# Patient Record
Sex: MALE | Race: WHITE | NOT HISPANIC OR LATINO | Employment: OTHER | URBAN - METROPOLITAN AREA
[De-identification: names, ages, dates, MRNs, and addresses within clinical notes are randomized per-mention and may not be internally consistent; named-entity substitution may affect disease eponyms.]

---

## 2017-01-30 ENCOUNTER — ALLSCRIPTS OFFICE VISIT (OUTPATIENT)
Dept: OTHER | Facility: OTHER | Age: 52
End: 2017-01-30

## 2017-01-30 DIAGNOSIS — K21.00 GASTRO-ESOPHAGEAL REFLUX DISEASE WITH ESOPHAGITIS: ICD-10-CM

## 2017-01-30 DIAGNOSIS — J22 ACUTE LOWER RESPIRATORY INFECTION: ICD-10-CM

## 2017-01-30 DIAGNOSIS — Z00.00 ENCOUNTER FOR GENERAL ADULT MEDICAL EXAMINATION WITHOUT ABNORMAL FINDINGS: ICD-10-CM

## 2017-01-30 DIAGNOSIS — I10 ESSENTIAL (PRIMARY) HYPERTENSION: ICD-10-CM

## 2017-01-30 DIAGNOSIS — Q54.0 HYPOSPADIAS, BALANIC: ICD-10-CM

## 2017-01-30 DIAGNOSIS — K44.9 DIAPHRAGMATIC HERNIA WITHOUT OBSTRUCTION OR GANGRENE: ICD-10-CM

## 2017-02-06 ENCOUNTER — HOSPITAL ENCOUNTER (OUTPATIENT)
Dept: RADIOLOGY | Facility: HOSPITAL | Age: 52
Discharge: HOME | End: 2017-02-06
Payer: COMMERCIAL

## 2017-02-06 DIAGNOSIS — K21.00 GASTRO-ESOPHAGEAL REFLUX DISEASE WITH ESOPHAGITIS: ICD-10-CM

## 2017-02-06 DIAGNOSIS — Z00.00 ENCOUNTER FOR GENERAL ADULT MEDICAL EXAMINATION WITHOUT ABNORMAL FINDINGS: ICD-10-CM

## 2017-02-06 DIAGNOSIS — K44.9 DIAPHRAGMATIC HERNIA WITHOUT OBSTRUCTION OR GANGRENE: ICD-10-CM

## 2017-02-06 DIAGNOSIS — Q54.0 HYPOSPADIAS, BALANIC: ICD-10-CM

## 2017-02-06 DIAGNOSIS — J22 ACUTE LOWER RESPIRATORY INFECTION: ICD-10-CM

## 2017-02-06 DIAGNOSIS — I10 ESSENTIAL (PRIMARY) HYPERTENSION: ICD-10-CM

## 2017-02-06 PROCEDURE — 74220 X-RAY XM ESOPHAGUS 1CNTRST: CPT

## 2017-03-09 ENCOUNTER — ALLSCRIPTS OFFICE VISIT (OUTPATIENT)
Dept: OTHER | Facility: OTHER | Age: 52
End: 2017-03-09

## 2017-03-13 ENCOUNTER — GENERIC CONVERSION - ENCOUNTER (OUTPATIENT)
Dept: OTHER | Facility: OTHER | Age: 52
End: 2017-03-13

## 2017-04-10 ENCOUNTER — ALLSCRIPTS OFFICE VISIT (OUTPATIENT)
Dept: OTHER | Facility: OTHER | Age: 52
End: 2017-04-10

## 2017-04-27 ENCOUNTER — HOSPITAL ENCOUNTER (INPATIENT)
Facility: HOSPITAL | Age: 52
LOS: 6 days | Discharge: HOME | DRG: 034 | End: 2017-05-03
Attending: ORTHOPAEDIC SURGERY | Admitting: ORTHOPAEDIC SURGERY
Payer: OTHER MISCELLANEOUS

## 2017-04-27 DIAGNOSIS — R29.898 WEAKNESS OF BOTH LOWER EXTREMITIES: ICD-10-CM

## 2017-04-27 DIAGNOSIS — G89.11 PAIN, ACUTE DUE TO TRAUMA: Primary | ICD-10-CM

## 2017-04-27 DIAGNOSIS — R29.898 WEAKNESS OF BACK: ICD-10-CM

## 2017-04-27 PROCEDURE — 87081 CULTURE SCREEN ONLY: CPT | Performed by: ORTHOPAEDIC SURGERY

## 2017-04-27 RX ORDER — ONDANSETRON 2 MG/ML
4 INJECTION INTRAMUSCULAR; INTRAVENOUS EVERY 6 HOURS PRN
Status: DISCONTINUED | OUTPATIENT
Start: 2017-04-27 | End: 2017-05-03 | Stop reason: HOSPADM

## 2017-04-27 RX ORDER — PANTOPRAZOLE SODIUM 20 MG/1
20 TABLET, DELAYED RELEASE ORAL
Status: DISCONTINUED | OUTPATIENT
Start: 2017-04-28 | End: 2017-05-03 | Stop reason: HOSPADM

## 2017-04-27 RX ORDER — LISINOPRIL 10 MG/1
10 TABLET ORAL DAILY
Status: DISCONTINUED | OUTPATIENT
Start: 2017-04-28 | End: 2017-05-03 | Stop reason: HOSPADM

## 2017-04-27 RX ORDER — ZOLPIDEM TARTRATE 5 MG/1
10 TABLET ORAL
Status: DISCONTINUED | OUTPATIENT
Start: 2017-04-27 | End: 2017-05-03 | Stop reason: HOSPADM

## 2017-04-27 RX ORDER — HYDROCODONE BITARTRATE 20 MG/1
20 CAPSULE, EXTENDED RELEASE ORAL EVERY 12 HOURS
Status: DISCONTINUED | OUTPATIENT
Start: 2017-04-27 | End: 2017-04-27

## 2017-04-27 RX ORDER — KETOROLAC TROMETHAMINE 30 MG/ML
15 INJECTION, SOLUTION INTRAMUSCULAR; INTRAVENOUS EVERY 6 HOURS PRN
Status: DISCONTINUED | OUTPATIENT
Start: 2017-04-27 | End: 2017-04-27

## 2017-04-27 RX ORDER — SODIUM CHLORIDE, SODIUM LACTATE, POTASSIUM CHLORIDE, CALCIUM CHLORIDE 600; 310; 30; 20 MG/100ML; MG/100ML; MG/100ML; MG/100ML
50 INJECTION, SOLUTION INTRAVENOUS CONTINUOUS
Status: DISCONTINUED | OUTPATIENT
Start: 2017-04-27 | End: 2017-05-03 | Stop reason: HOSPADM

## 2017-04-27 RX ORDER — KETOROLAC TROMETHAMINE 30 MG/ML
15 INJECTION, SOLUTION INTRAMUSCULAR; INTRAVENOUS EVERY 6 HOURS PRN
Status: DISCONTINUED | OUTPATIENT
Start: 2017-04-27 | End: 2017-04-29

## 2017-04-27 RX ORDER — OMEPRAZOLE 20 MG/1
20 CAPSULE, DELAYED RELEASE ORAL DAILY
Status: DISCONTINUED | OUTPATIENT
Start: 2017-04-28 | End: 2017-04-27

## 2017-04-27 RX ORDER — CLONIDINE HYDROCHLORIDE 0.1 MG/1
0.2 TABLET ORAL 2 TIMES DAILY
Status: DISCONTINUED | OUTPATIENT
Start: 2017-04-27 | End: 2017-04-28

## 2017-04-27 RX ORDER — OXYCODONE HYDROCHLORIDE AND ACETAMINOPHEN 5; 325 MG/1; MG/1
1 TABLET ORAL EVERY 4 HOURS PRN
Status: DISCONTINUED | OUTPATIENT
Start: 2017-04-27 | End: 2017-04-29

## 2017-04-27 RX ADMIN — HYDROMORPHONE HYDROCHLORIDE 1 MG: 1 INJECTION, SOLUTION INTRAMUSCULAR; INTRAVENOUS; SUBCUTANEOUS at 21:56

## 2017-04-27 RX ADMIN — DEXAMETHASONE SODIUM PHOSPHATE 6 MG: 10 INJECTION, SOLUTION INTRAMUSCULAR; INTRAVENOUS at 20:00

## 2017-04-27 RX ADMIN — CLONIDINE HYDROCHLORIDE 0.2 MG: 0.1 TABLET ORAL at 21:57

## 2017-04-27 RX ADMIN — SODIUM CHLORIDE, SODIUM LACTATE, POTASSIUM CHLORIDE, AND CALCIUM CHLORIDE 50 ML/HR: .6; .31; .03; .02 INJECTION, SOLUTION INTRAVENOUS at 21:57

## 2017-04-27 RX ADMIN — ZOLPIDEM TARTRATE 10 MG: 5 TABLET, COATED ORAL at 21:57

## 2017-04-28 ENCOUNTER — APPOINTMENT (INPATIENT)
Dept: RADIOLOGY | Facility: HOSPITAL | Age: 52
DRG: 034 | End: 2017-04-28
Payer: OTHER MISCELLANEOUS

## 2017-04-28 PROBLEM — R29.898 LOWER EXTREMITY WEAKNESS: Status: ACTIVE | Noted: 2017-04-28

## 2017-04-28 LAB
ANION GAP SERPL CALCULATED.3IONS-SCNC: 11 MMOL/L (ref 4–13)
BUN SERPL-MCNC: 15 MG/DL (ref 5–25)
CALCIUM SERPL-MCNC: 9.2 MG/DL (ref 8.3–10.1)
CHLORIDE SERPL-SCNC: 100 MMOL/L (ref 100–108)
CO2 SERPL-SCNC: 24 MMOL/L (ref 21–32)
CREAT SERPL-MCNC: 0.83 MG/DL (ref 0.6–1.3)
CRP SERPL QL: <3 MG/L
GFR SERPL CREATININE-BSD FRML MDRD: >60 ML/MIN/1.73SQ M
GLUCOSE SERPL-MCNC: 135 MG/DL (ref 65–140)
POTASSIUM SERPL-SCNC: 4 MMOL/L (ref 3.5–5.3)
SODIUM SERPL-SCNC: 135 MMOL/L (ref 136–145)
VIT B12 SERPL-MCNC: 482 PG/ML (ref 100–900)

## 2017-04-28 PROCEDURE — 72158 MRI LUMBAR SPINE W/O & W/DYE: CPT

## 2017-04-28 PROCEDURE — 72157 MRI CHEST SPINE W/O & W/DYE: CPT

## 2017-04-28 PROCEDURE — A9585 GADOBUTROL INJECTION: HCPCS | Performed by: ORTHOPAEDIC SURGERY

## 2017-04-28 PROCEDURE — 86140 C-REACTIVE PROTEIN: CPT | Performed by: PSYCHIATRY & NEUROLOGY

## 2017-04-28 PROCEDURE — 80048 BASIC METABOLIC PNL TOTAL CA: CPT | Performed by: PHYSICIAN ASSISTANT

## 2017-04-28 PROCEDURE — 82607 VITAMIN B-12: CPT | Performed by: PSYCHIATRY & NEUROLOGY

## 2017-04-28 RX ORDER — CLONIDINE HYDROCHLORIDE 0.1 MG/1
0.1 TABLET ORAL EVERY 8 HOURS PRN
Status: DISCONTINUED | OUTPATIENT
Start: 2017-04-28 | End: 2017-05-03 | Stop reason: HOSPADM

## 2017-04-28 RX ORDER — BUTALBITAL, ACETAMINOPHEN AND CAFFEINE 50; 325; 40 MG/1; MG/1; MG/1
1 TABLET ORAL EVERY 4 HOURS PRN
Status: DISCONTINUED | OUTPATIENT
Start: 2017-04-28 | End: 2017-05-03 | Stop reason: HOSPADM

## 2017-04-28 RX ADMIN — HYDROMORPHONE HYDROCHLORIDE 1 MG: 1 INJECTION, SOLUTION INTRAMUSCULAR; INTRAVENOUS; SUBCUTANEOUS at 15:31

## 2017-04-28 RX ADMIN — HYDROMORPHONE HYDROCHLORIDE 1 MG: 1 INJECTION, SOLUTION INTRAMUSCULAR; INTRAVENOUS; SUBCUTANEOUS at 12:11

## 2017-04-28 RX ADMIN — DEXAMETHASONE SODIUM PHOSPHATE 6 MG: 10 INJECTION, SOLUTION INTRAMUSCULAR; INTRAVENOUS at 10:56

## 2017-04-28 RX ADMIN — LISINOPRIL 10 MG: 10 TABLET ORAL at 11:02

## 2017-04-28 RX ADMIN — CLONIDINE HYDROCHLORIDE 0.2 MG: 0.1 TABLET ORAL at 08:16

## 2017-04-28 RX ADMIN — KETOROLAC TROMETHAMINE 15 MG: 30 INJECTION, SOLUTION INTRAMUSCULAR at 22:32

## 2017-04-28 RX ADMIN — SODIUM CHLORIDE, SODIUM LACTATE, POTASSIUM CHLORIDE, AND CALCIUM CHLORIDE 50 ML/HR: .6; .31; .03; .02 INJECTION, SOLUTION INTRAVENOUS at 17:19

## 2017-04-28 RX ADMIN — HYDROMORPHONE HYDROCHLORIDE 1 MG: 1 INJECTION, SOLUTION INTRAMUSCULAR; INTRAVENOUS; SUBCUTANEOUS at 08:11

## 2017-04-28 RX ADMIN — HYDROMORPHONE HYDROCHLORIDE 1 MG: 1 INJECTION, SOLUTION INTRAMUSCULAR; INTRAVENOUS; SUBCUTANEOUS at 04:50

## 2017-04-28 RX ADMIN — DEXAMETHASONE SODIUM PHOSPHATE 6 MG: 10 INJECTION, SOLUTION INTRAMUSCULAR; INTRAVENOUS at 02:57

## 2017-04-28 RX ADMIN — DEXAMETHASONE SODIUM PHOSPHATE 6 MG: 10 INJECTION, SOLUTION INTRAMUSCULAR; INTRAVENOUS at 17:09

## 2017-04-28 RX ADMIN — HYDROMORPHONE HYDROCHLORIDE 1 MG: 1 INJECTION, SOLUTION INTRAMUSCULAR; INTRAVENOUS; SUBCUTANEOUS at 01:23

## 2017-04-28 RX ADMIN — KETOROLAC TROMETHAMINE 15 MG: 30 INJECTION, SOLUTION INTRAMUSCULAR at 10:29

## 2017-04-28 RX ADMIN — HYDROMORPHONE HYDROCHLORIDE 1 MG: 1 INJECTION, SOLUTION INTRAMUSCULAR; INTRAVENOUS; SUBCUTANEOUS at 22:33

## 2017-04-28 RX ADMIN — ZOLPIDEM TARTRATE 10 MG: 5 TABLET, COATED ORAL at 22:25

## 2017-04-28 RX ADMIN — DEXAMETHASONE SODIUM PHOSPHATE 6 MG: 10 INJECTION, SOLUTION INTRAMUSCULAR; INTRAVENOUS at 22:25

## 2017-04-28 RX ADMIN — PANTOPRAZOLE SODIUM 20 MG: 20 TABLET, DELAYED RELEASE ORAL at 05:28

## 2017-04-28 RX ADMIN — GADOBUTROL 9 ML: 604.72 INJECTION INTRAVENOUS at 14:51

## 2017-04-29 ENCOUNTER — APPOINTMENT (INPATIENT)
Dept: RADIOLOGY | Facility: HOSPITAL | Age: 52
DRG: 034 | End: 2017-04-29
Payer: OTHER MISCELLANEOUS

## 2017-04-29 LAB — MRSA NOSE QL CULT: NORMAL

## 2017-04-29 PROCEDURE — A9585 GADOBUTROL INJECTION: HCPCS | Performed by: ORTHOPAEDIC SURGERY

## 2017-04-29 PROCEDURE — 72156 MRI NECK SPINE W/O & W/DYE: CPT

## 2017-04-29 PROCEDURE — 70553 MRI BRAIN STEM W/O & W/DYE: CPT

## 2017-04-29 RX ORDER — DIPHENHYDRAMINE HYDROCHLORIDE 50 MG/ML
25 INJECTION INTRAMUSCULAR; INTRAVENOUS ONCE
Status: COMPLETED | OUTPATIENT
Start: 2017-04-29 | End: 2017-04-29

## 2017-04-29 RX ADMIN — HYDROMORPHONE HYDROCHLORIDE 1 MG: 1 INJECTION, SOLUTION INTRAMUSCULAR; INTRAVENOUS; SUBCUTANEOUS at 10:16

## 2017-04-29 RX ADMIN — SODIUM CHLORIDE, SODIUM LACTATE, POTASSIUM CHLORIDE, AND CALCIUM CHLORIDE 50 ML/HR: .6; .31; .03; .02 INJECTION, SOLUTION INTRAVENOUS at 15:11

## 2017-04-29 RX ADMIN — DIPHENHYDRAMINE HYDROCHLORIDE 25 MG: 50 INJECTION, SOLUTION INTRAMUSCULAR; INTRAVENOUS at 04:43

## 2017-04-29 RX ADMIN — DEXAMETHASONE SODIUM PHOSPHATE 6 MG: 10 INJECTION, SOLUTION INTRAMUSCULAR; INTRAVENOUS at 11:28

## 2017-04-29 RX ADMIN — ZOLPIDEM TARTRATE 10 MG: 5 TABLET, COATED ORAL at 23:55

## 2017-04-29 RX ADMIN — DEXAMETHASONE SODIUM PHOSPHATE 6 MG: 10 INJECTION, SOLUTION INTRAMUSCULAR; INTRAVENOUS at 04:35

## 2017-04-29 RX ADMIN — HYDROMORPHONE HYDROCHLORIDE 1 MG: 1 INJECTION, SOLUTION INTRAMUSCULAR; INTRAVENOUS; SUBCUTANEOUS at 02:22

## 2017-04-29 RX ADMIN — HYDROMORPHONE HYDROCHLORIDE 1 MG: 1 INJECTION, SOLUTION INTRAMUSCULAR; INTRAVENOUS; SUBCUTANEOUS at 06:23

## 2017-04-29 RX ADMIN — GADOBUTROL 9 ML: 604.72 INJECTION INTRAVENOUS at 12:24

## 2017-04-29 RX ADMIN — LISINOPRIL 10 MG: 10 TABLET ORAL at 09:19

## 2017-04-29 RX ADMIN — FAMOTIDINE 20 MG: 10 INJECTION, SOLUTION INTRAVENOUS at 04:53

## 2017-04-29 RX ADMIN — HYDROMORPHONE HYDROCHLORIDE 1 MG: 1 INJECTION, SOLUTION INTRAMUSCULAR; INTRAVENOUS; SUBCUTANEOUS at 23:51

## 2017-04-29 RX ADMIN — OXYCODONE HYDROCHLORIDE AND ACETAMINOPHEN 1 TABLET: 5; 325 TABLET ORAL at 03:25

## 2017-04-29 RX ADMIN — HYDROMORPHONE HYDROCHLORIDE 1 MG: 1 INJECTION, SOLUTION INTRAMUSCULAR; INTRAVENOUS; SUBCUTANEOUS at 14:02

## 2017-04-29 RX ADMIN — PANTOPRAZOLE SODIUM 20 MG: 20 TABLET, DELAYED RELEASE ORAL at 06:36

## 2017-04-29 RX ADMIN — DEXAMETHASONE SODIUM PHOSPHATE 6 MG: 10 INJECTION INTRAMUSCULAR; INTRAVENOUS at 23:51

## 2017-04-29 RX ADMIN — KETOROLAC TROMETHAMINE 15 MG: 30 INJECTION, SOLUTION INTRAMUSCULAR at 04:36

## 2017-04-29 RX ADMIN — HYDROMORPHONE HYDROCHLORIDE 1 MG: 1 INJECTION, SOLUTION INTRAMUSCULAR; INTRAVENOUS; SUBCUTANEOUS at 20:21

## 2017-04-30 LAB
ANION GAP SERPL CALCULATED.3IONS-SCNC: 8 MMOL/L (ref 4–13)
BUN SERPL-MCNC: 15 MG/DL (ref 5–25)
CALCIUM SERPL-MCNC: 8.7 MG/DL (ref 8.3–10.1)
CHLORIDE SERPL-SCNC: 105 MMOL/L (ref 100–108)
CO2 SERPL-SCNC: 27 MMOL/L (ref 21–32)
CREAT SERPL-MCNC: 0.84 MG/DL (ref 0.6–1.3)
GFR SERPL CREATININE-BSD FRML MDRD: >60 ML/MIN/1.73SQ M
GLUCOSE SERPL-MCNC: 121 MG/DL (ref 65–140)
POTASSIUM SERPL-SCNC: 4.4 MMOL/L (ref 3.5–5.3)
SODIUM SERPL-SCNC: 140 MMOL/L (ref 136–145)

## 2017-04-30 PROCEDURE — 80048 BASIC METABOLIC PNL TOTAL CA: CPT | Performed by: INTERNAL MEDICINE

## 2017-04-30 RX ORDER — DOCUSATE SODIUM 100 MG/1
100 CAPSULE, LIQUID FILLED ORAL 2 TIMES DAILY
Status: DISCONTINUED | OUTPATIENT
Start: 2017-04-30 | End: 2017-05-03 | Stop reason: HOSPADM

## 2017-04-30 RX ORDER — POLYETHYLENE GLYCOL 3350 17 G/17G
17 POWDER, FOR SOLUTION ORAL DAILY PRN
Status: DISCONTINUED | OUTPATIENT
Start: 2017-04-30 | End: 2017-05-03 | Stop reason: HOSPADM

## 2017-04-30 RX ADMIN — ZOLPIDEM TARTRATE 10 MG: 5 TABLET, COATED ORAL at 22:23

## 2017-04-30 RX ADMIN — LISINOPRIL 10 MG: 10 TABLET ORAL at 08:14

## 2017-04-30 RX ADMIN — SODIUM CHLORIDE, SODIUM LACTATE, POTASSIUM CHLORIDE, AND CALCIUM CHLORIDE 50 ML/HR: .6; .31; .03; .02 INJECTION, SOLUTION INTRAVENOUS at 10:03

## 2017-04-30 RX ADMIN — DEXAMETHASONE SODIUM PHOSPHATE 6 MG: 10 INJECTION INTRAMUSCULAR; INTRAVENOUS at 05:58

## 2017-04-30 RX ADMIN — PANTOPRAZOLE SODIUM 20 MG: 20 TABLET, DELAYED RELEASE ORAL at 06:02

## 2017-04-30 RX ADMIN — HYDROMORPHONE HYDROCHLORIDE 1 MG: 1 INJECTION, SOLUTION INTRAMUSCULAR; INTRAVENOUS; SUBCUTANEOUS at 12:24

## 2017-04-30 RX ADMIN — POLYETHYLENE GLYCOL 3350 17 G: 17 POWDER, FOR SOLUTION ORAL at 16:18

## 2017-04-30 RX ADMIN — DEXAMETHASONE SODIUM PHOSPHATE 4 MG: 10 INJECTION INTRAMUSCULAR; INTRAVENOUS at 19:34

## 2017-04-30 RX ADMIN — HYDROMORPHONE HYDROCHLORIDE 1 MG: 1 INJECTION, SOLUTION INTRAMUSCULAR; INTRAVENOUS; SUBCUTANEOUS at 09:07

## 2017-04-30 RX ADMIN — HYDROMORPHONE HYDROCHLORIDE 1 MG: 1 INJECTION, SOLUTION INTRAMUSCULAR; INTRAVENOUS; SUBCUTANEOUS at 15:25

## 2017-04-30 RX ADMIN — HYDROMORPHONE HYDROCHLORIDE 1 MG: 1 INJECTION, SOLUTION INTRAMUSCULAR; INTRAVENOUS; SUBCUTANEOUS at 19:30

## 2017-04-30 RX ADMIN — HYDROMORPHONE HYDROCHLORIDE 1 MG: 1 INJECTION, SOLUTION INTRAMUSCULAR; INTRAVENOUS; SUBCUTANEOUS at 22:25

## 2017-04-30 RX ADMIN — DOCUSATE SODIUM 100 MG: 100 CAPSULE, LIQUID FILLED ORAL at 22:22

## 2017-04-30 RX ADMIN — HYDROMORPHONE HYDROCHLORIDE 1 MG: 1 INJECTION, SOLUTION INTRAMUSCULAR; INTRAVENOUS; SUBCUTANEOUS at 06:03

## 2017-04-30 RX ADMIN — HYDROMORPHONE HYDROCHLORIDE 1 MG: 1 INJECTION, SOLUTION INTRAMUSCULAR; INTRAVENOUS; SUBCUTANEOUS at 02:51

## 2017-05-01 ENCOUNTER — APPOINTMENT (INPATIENT)
Dept: NEUROLOGY | Facility: HOSPITAL | Age: 52
DRG: 034 | End: 2017-05-01
Attending: PSYCHIATRY & NEUROLOGY
Payer: OTHER MISCELLANEOUS

## 2017-05-01 PROCEDURE — 95886 MUSC TEST DONE W/N TEST COMP: CPT | Performed by: PSYCHIATRY & NEUROLOGY

## 2017-05-01 PROCEDURE — 95909 NRV CNDJ TST 5-6 STUDIES: CPT

## 2017-05-01 PROCEDURE — 95909 NRV CNDJ TST 5-6 STUDIES: CPT | Performed by: PSYCHIATRY & NEUROLOGY

## 2017-05-01 PROCEDURE — 95886 MUSC TEST DONE W/N TEST COMP: CPT

## 2017-05-01 RX ORDER — ESCITALOPRAM OXALATE 10 MG/1
10 TABLET ORAL DAILY
Status: DISCONTINUED | OUTPATIENT
Start: 2017-05-02 | End: 2017-05-02

## 2017-05-01 RX ORDER — ALPRAZOLAM 0.25 MG/1
0.25 TABLET ORAL 3 TIMES DAILY PRN
Status: DISCONTINUED | OUTPATIENT
Start: 2017-05-01 | End: 2017-05-02

## 2017-05-01 RX ADMIN — SODIUM CHLORIDE, SODIUM LACTATE, POTASSIUM CHLORIDE, AND CALCIUM CHLORIDE 50 ML/HR: .6; .31; .03; .02 INJECTION, SOLUTION INTRAVENOUS at 04:44

## 2017-05-01 RX ADMIN — DEXAMETHASONE SODIUM PHOSPHATE 4 MG: 10 INJECTION INTRAMUSCULAR; INTRAVENOUS at 13:52

## 2017-05-01 RX ADMIN — DEXAMETHASONE SODIUM PHOSPHATE 4 MG: 10 INJECTION INTRAMUSCULAR; INTRAVENOUS at 01:34

## 2017-05-01 RX ADMIN — DEXAMETHASONE SODIUM PHOSPHATE 4 MG: 10 INJECTION INTRAMUSCULAR; INTRAVENOUS at 20:34

## 2017-05-01 RX ADMIN — HYDROMORPHONE HYDROCHLORIDE 1 MG: 1 INJECTION, SOLUTION INTRAMUSCULAR; INTRAVENOUS; SUBCUTANEOUS at 11:00

## 2017-05-01 RX ADMIN — HYDROMORPHONE HYDROCHLORIDE 1 MG: 1 INJECTION, SOLUTION INTRAMUSCULAR; INTRAVENOUS; SUBCUTANEOUS at 07:47

## 2017-05-01 RX ADMIN — DOCUSATE SODIUM 100 MG: 100 CAPSULE, LIQUID FILLED ORAL at 20:34

## 2017-05-01 RX ADMIN — HYDROMORPHONE HYDROCHLORIDE 1 MG: 1 INJECTION, SOLUTION INTRAMUSCULAR; INTRAVENOUS; SUBCUTANEOUS at 13:56

## 2017-05-01 RX ADMIN — LISINOPRIL 10 MG: 10 TABLET ORAL at 08:06

## 2017-05-01 RX ADMIN — HYDROMORPHONE HYDROCHLORIDE 1 MG: 1 INJECTION, SOLUTION INTRAMUSCULAR; INTRAVENOUS; SUBCUTANEOUS at 20:25

## 2017-05-01 RX ADMIN — HYDROMORPHONE HYDROCHLORIDE 1 MG: 1 INJECTION, SOLUTION INTRAMUSCULAR; INTRAVENOUS; SUBCUTANEOUS at 04:37

## 2017-05-01 RX ADMIN — HYDROMORPHONE HYDROCHLORIDE 1 MG: 1 INJECTION, SOLUTION INTRAMUSCULAR; INTRAVENOUS; SUBCUTANEOUS at 16:59

## 2017-05-01 RX ADMIN — HYDROMORPHONE HYDROCHLORIDE 1 MG: 1 INJECTION, SOLUTION INTRAMUSCULAR; INTRAVENOUS; SUBCUTANEOUS at 01:34

## 2017-05-01 RX ADMIN — DOCUSATE SODIUM 100 MG: 100 CAPSULE, LIQUID FILLED ORAL at 08:06

## 2017-05-01 RX ADMIN — DEXAMETHASONE SODIUM PHOSPHATE 4 MG: 10 INJECTION INTRAMUSCULAR; INTRAVENOUS at 06:57

## 2017-05-01 RX ADMIN — HYDROMORPHONE HYDROCHLORIDE 1 MG: 1 INJECTION, SOLUTION INTRAMUSCULAR; INTRAVENOUS; SUBCUTANEOUS at 23:31

## 2017-05-01 RX ADMIN — PANTOPRAZOLE SODIUM 20 MG: 20 TABLET, DELAYED RELEASE ORAL at 06:57

## 2017-05-01 RX ADMIN — ZOLPIDEM TARTRATE 10 MG: 5 TABLET, COATED ORAL at 22:58

## 2017-05-02 ENCOUNTER — APPOINTMENT (INPATIENT)
Dept: PHYSICAL THERAPY | Facility: HOSPITAL | Age: 52
DRG: 034 | End: 2017-05-02
Payer: OTHER MISCELLANEOUS

## 2017-05-02 PROBLEM — Z98.890 PERSONAL HISTORY OF SPINE SURGERY: Status: ACTIVE | Noted: 2017-05-02

## 2017-05-02 PROCEDURE — 97163 PT EVAL HIGH COMPLEX 45 MIN: CPT

## 2017-05-02 PROCEDURE — G8987 SELF CARE CURRENT STATUS: HCPCS

## 2017-05-02 PROCEDURE — 97110 THERAPEUTIC EXERCISES: CPT

## 2017-05-02 PROCEDURE — G8979 MOBILITY GOAL STATUS: HCPCS

## 2017-05-02 PROCEDURE — G8978 MOBILITY CURRENT STATUS: HCPCS

## 2017-05-02 PROCEDURE — 97167 OT EVAL HIGH COMPLEX 60 MIN: CPT

## 2017-05-02 PROCEDURE — G8988 SELF CARE GOAL STATUS: HCPCS

## 2017-05-02 RX ORDER — DEXAMETHASONE SODIUM PHOSPHATE 4 MG/ML
2 INJECTION, SOLUTION INTRA-ARTICULAR; INTRALESIONAL; INTRAMUSCULAR; INTRAVENOUS; SOFT TISSUE EVERY 8 HOURS
Status: DISCONTINUED | OUTPATIENT
Start: 2017-05-04 | End: 2017-05-03 | Stop reason: HOSPADM

## 2017-05-02 RX ORDER — DEXAMETHASONE SODIUM PHOSPHATE 4 MG/ML
2 INJECTION, SOLUTION INTRA-ARTICULAR; INTRALESIONAL; INTRAMUSCULAR; INTRAVENOUS; SOFT TISSUE EVERY 6 HOURS
Status: DISCONTINUED | OUTPATIENT
Start: 2017-05-03 | End: 2017-05-03 | Stop reason: HOSPADM

## 2017-05-02 RX ADMIN — DEXAMETHASONE SODIUM PHOSPHATE 4 MG: 10 INJECTION INTRAMUSCULAR; INTRAVENOUS at 02:28

## 2017-05-02 RX ADMIN — DEXAMETHASONE SODIUM PHOSPHATE 4 MG: 10 INJECTION INTRAMUSCULAR; INTRAVENOUS at 17:52

## 2017-05-02 RX ADMIN — DOCUSATE SODIUM 100 MG: 100 CAPSULE, LIQUID FILLED ORAL at 08:28

## 2017-05-02 RX ADMIN — DEXAMETHASONE SODIUM PHOSPHATE 4 MG: 10 INJECTION INTRAMUSCULAR; INTRAVENOUS at 08:29

## 2017-05-02 RX ADMIN — HYDROMORPHONE HYDROCHLORIDE 1 MG: 1 INJECTION, SOLUTION INTRAMUSCULAR; INTRAVENOUS; SUBCUTANEOUS at 08:42

## 2017-05-02 RX ADMIN — HYDROMORPHONE HYDROCHLORIDE 1 MG: 1 INJECTION, SOLUTION INTRAMUSCULAR; INTRAVENOUS; SUBCUTANEOUS at 20:50

## 2017-05-02 RX ADMIN — HYDROMORPHONE HYDROCHLORIDE 1 MG: 1 INJECTION, SOLUTION INTRAMUSCULAR; INTRAVENOUS; SUBCUTANEOUS at 11:42

## 2017-05-02 RX ADMIN — DOCUSATE SODIUM 100 MG: 100 CAPSULE, LIQUID FILLED ORAL at 21:03

## 2017-05-02 RX ADMIN — HYDROMORPHONE HYDROCHLORIDE 1 MG: 1 INJECTION, SOLUTION INTRAMUSCULAR; INTRAVENOUS; SUBCUTANEOUS at 14:42

## 2017-05-02 RX ADMIN — HYDROMORPHONE HYDROCHLORIDE 1 MG: 1 INJECTION, SOLUTION INTRAMUSCULAR; INTRAVENOUS; SUBCUTANEOUS at 23:50

## 2017-05-02 RX ADMIN — ZOLPIDEM TARTRATE 10 MG: 5 TABLET, COATED ORAL at 21:03

## 2017-05-02 RX ADMIN — LISINOPRIL 10 MG: 10 TABLET ORAL at 08:28

## 2017-05-02 RX ADMIN — HYDROMORPHONE HYDROCHLORIDE 1 MG: 1 INJECTION, SOLUTION INTRAMUSCULAR; INTRAVENOUS; SUBCUTANEOUS at 05:42

## 2017-05-02 RX ADMIN — HYDROMORPHONE HYDROCHLORIDE 1 MG: 1 INJECTION, SOLUTION INTRAMUSCULAR; INTRAVENOUS; SUBCUTANEOUS at 17:50

## 2017-05-02 RX ADMIN — SODIUM CHLORIDE, SODIUM LACTATE, POTASSIUM CHLORIDE, AND CALCIUM CHLORIDE 50 ML/HR: .6; .31; .03; .02 INJECTION, SOLUTION INTRAVENOUS at 23:54

## 2017-05-02 RX ADMIN — DEXAMETHASONE SODIUM PHOSPHATE 4 MG: 10 INJECTION INTRAMUSCULAR; INTRAVENOUS at 23:49

## 2017-05-02 RX ADMIN — HYDROMORPHONE HYDROCHLORIDE 1 MG: 1 INJECTION, SOLUTION INTRAMUSCULAR; INTRAVENOUS; SUBCUTANEOUS at 02:28

## 2017-05-02 RX ADMIN — PANTOPRAZOLE SODIUM 20 MG: 20 TABLET, DELAYED RELEASE ORAL at 05:42

## 2017-05-02 RX ADMIN — SODIUM CHLORIDE, SODIUM LACTATE, POTASSIUM CHLORIDE, AND CALCIUM CHLORIDE 50 ML/HR: .6; .31; .03; .02 INJECTION, SOLUTION INTRAVENOUS at 02:36

## 2017-05-03 VITALS
WEIGHT: 197 LBS | RESPIRATION RATE: 18 BRPM | OXYGEN SATURATION: 96 % | HEIGHT: 75 IN | DIASTOLIC BLOOD PRESSURE: 86 MMHG | SYSTOLIC BLOOD PRESSURE: 128 MMHG | HEART RATE: 81 BPM | BODY MASS INDEX: 24.49 KG/M2 | TEMPERATURE: 97.2 F

## 2017-05-03 RX ORDER — DEXAMETHASONE 2 MG/1
2 TABLET ORAL EVERY 8 HOURS
Qty: 9 TABLET | Refills: 0 | Status: SHIPPED | OUTPATIENT
Start: 2017-05-03 | End: 2017-05-06

## 2017-05-03 RX ORDER — CLONIDINE HYDROCHLORIDE 0.1 MG/1
0.1 TABLET ORAL EVERY 8 HOURS PRN
Qty: 30 TABLET | Refills: 0 | Status: SHIPPED | OUTPATIENT
Start: 2017-05-03 | End: 2018-02-23

## 2017-05-03 RX ADMIN — DEXAMETHASONE SODIUM PHOSPHATE 2 MG: 4 INJECTION, SOLUTION INTRAMUSCULAR; INTRAVENOUS at 08:35

## 2017-05-03 RX ADMIN — LISINOPRIL 10 MG: 10 TABLET ORAL at 08:34

## 2017-05-03 RX ADMIN — HYDROMORPHONE HYDROCHLORIDE 1 MG: 1 INJECTION, SOLUTION INTRAMUSCULAR; INTRAVENOUS; SUBCUTANEOUS at 18:02

## 2017-05-03 RX ADMIN — PANTOPRAZOLE SODIUM 20 MG: 20 TABLET, DELAYED RELEASE ORAL at 06:45

## 2017-05-03 RX ADMIN — HYDROMORPHONE HYDROCHLORIDE 1 MG: 1 INJECTION, SOLUTION INTRAMUSCULAR; INTRAVENOUS; SUBCUTANEOUS at 14:54

## 2017-05-03 RX ADMIN — DOCUSATE SODIUM 100 MG: 100 CAPSULE, LIQUID FILLED ORAL at 08:34

## 2017-05-03 RX ADMIN — HYDROMORPHONE HYDROCHLORIDE 1 MG: 1 INJECTION, SOLUTION INTRAMUSCULAR; INTRAVENOUS; SUBCUTANEOUS at 05:50

## 2017-05-03 RX ADMIN — DEXAMETHASONE SODIUM PHOSPHATE 2 MG: 4 INJECTION, SOLUTION INTRAMUSCULAR; INTRAVENOUS at 15:10

## 2017-05-03 RX ADMIN — HYDROMORPHONE HYDROCHLORIDE 1 MG: 1 INJECTION, SOLUTION INTRAMUSCULAR; INTRAVENOUS; SUBCUTANEOUS at 08:50

## 2017-05-03 RX ADMIN — HYDROMORPHONE HYDROCHLORIDE 1 MG: 1 INJECTION, SOLUTION INTRAMUSCULAR; INTRAVENOUS; SUBCUTANEOUS at 02:50

## 2017-05-03 RX ADMIN — HYDROMORPHONE HYDROCHLORIDE 1 MG: 1 INJECTION, SOLUTION INTRAMUSCULAR; INTRAVENOUS; SUBCUTANEOUS at 18:15

## 2017-05-03 RX ADMIN — HYDROMORPHONE HYDROCHLORIDE 1 MG: 1 INJECTION, SOLUTION INTRAMUSCULAR; INTRAVENOUS; SUBCUTANEOUS at 11:50

## 2017-05-19 ENCOUNTER — HOSPITAL ENCOUNTER (OUTPATIENT)
Dept: GASTROENTEROLOGY | Facility: HOSPITAL | Age: 52
Discharge: HOME | End: 2017-05-19
Admitting: THORACIC SURGERY (CARDIOTHORACIC VASCULAR SURGERY)
Payer: COMMERCIAL

## 2017-05-19 VITALS
HEIGHT: 75 IN | WEIGHT: 205 LBS | TEMPERATURE: 98 F | OXYGEN SATURATION: 97 % | SYSTOLIC BLOOD PRESSURE: 146 MMHG | BODY MASS INDEX: 25.49 KG/M2 | DIASTOLIC BLOOD PRESSURE: 87 MMHG | HEART RATE: 95 BPM | RESPIRATION RATE: 18 BRPM

## 2017-05-19 PROCEDURE — 91122 HB ANAL PRESSURE RECORD: CPT

## 2017-08-04 ENCOUNTER — ALLSCRIPTS OFFICE VISIT (OUTPATIENT)
Dept: OTHER | Facility: OTHER | Age: 52
End: 2017-08-04

## 2017-11-13 ENCOUNTER — GENERIC CONVERSION - ENCOUNTER (OUTPATIENT)
Dept: OTHER | Facility: OTHER | Age: 52
End: 2017-11-13
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
## 2018-01-12 VITALS
WEIGHT: 199 LBS | BODY MASS INDEX: 25.54 KG/M2 | TEMPERATURE: 95.6 F | DIASTOLIC BLOOD PRESSURE: 72 MMHG | OXYGEN SATURATION: 96 % | HEIGHT: 74 IN | SYSTOLIC BLOOD PRESSURE: 118 MMHG | HEART RATE: 73 BPM

## 2018-01-12 VITALS
HEART RATE: 100 BPM | HEIGHT: 74 IN | RESPIRATION RATE: 16 BRPM | BODY MASS INDEX: 25.8 KG/M2 | DIASTOLIC BLOOD PRESSURE: 90 MMHG | TEMPERATURE: 97 F | WEIGHT: 201 LBS | SYSTOLIC BLOOD PRESSURE: 120 MMHG

## 2018-01-12 NOTE — RESULT NOTES
Message   Please schedule reminder for colonoscopy in one year  Verified Results  (1) TISSUE EXAM [id number] 10:26AM [facility]     Test Name Result Flag Reference   LAB AP CASE REPORT (Report)     Surgical Pathology Report             Case: [id number]                   Authorizing Provider: Clark Maloney MD       Collected:      12/07/2016 1026        Ordering Location:   [address] [place] [address]   Received:      12/08/2016 [facility]                               Pathologist:      Winford Pallas, MD                              Specimens:  A) - Esophagogastric junction, R/O Barretts Esophagus                         B) - Esophagus, R/O esinophilic esophagitis                              C) - Polyp, Colorectal, Ascending colon polyp  hot snare x2                      D) - Polyp, Colorectal, Hepatic flexure polyp                             E) - Polyp, Colorectal, Transverse colon polyp                             F) - Polyp, Colorectal, Rectal polyp x3   LAB AP FINAL DIAGNOSIS (Report)     A  Gastroesophageal junction, biopsy:    - Cardiac gastric and squamous junctional mucosa with intestinal   metaplasia (goblet cells present) consistent with     Mustafa's esophagus     - No dysplasia or neoplasia identified  B  Esophagus, biopsy:    - Squamous esophageal mucosa without significant pathologic   abnormalities  - No intraepithelial eosinophils identified     - No gastric type mucosa, dysplasia or neoplasia identified  C  Colon, ascending, polyps:    - Tubular adenomas (x2)  - No high grade dysplasia or carcinoma identified  D  Colon, hepatic flexure, polyp:    - Tubular adenoma  - No high grade dysplasia or carcinoma identified  E  Colon, transverse, polyp:    - Tubulovillous adenoma, fragments     - No high grade dysplasia or carcinoma identified  F  Colon, rectum, polyps:    - Hyperplastic polyps (x3)      - No dysplasia or carcinoma identified  Electronically signed by Iglesia Allen MD on 12/9/2016 at 3:03 PM   LAB AP SURGICAL ADDITIONAL INFORMATION (Report)     These tests were developed and their performance characteristics   determined by [facility] or [facility]  They may not be cleared or approved by the [place]  Food and   Drug Administration  The FDA has determined that such clearance or   approval is not necessary  These tests are used for clinical purposes  They should not be regarded as investigational or for research  This   laboratory has been approved by [place] [id number], designated as a high-complexity   laboratory and is qualified to perform these tests  - Interpretation performed at [facility]   LAB AP GROSS DESCRIPTION (Report)     A  The specimen is received in formalin, labeled with the patient's name   and hospital number, and is designated GE junction rule out Mustafa's  The specimen consists of multiple tan-white soft tissue fragments   measuring in aggregate 0 3 x 0 3 x 0 1 cm  Entirely submitted  One   cassette  B  The specimen is received in formalin, labeled with the patient's name   and hospital number, and is designated esophagus rule out eosinophilic   esophagitis  The specimen consists of several white-tan soft tissue   fragments measuring in aggregate 0 5 x 0 3 x 0 1 cm  Entirely submitted  One cassette  Note: The estimated total formalin fixation time based upon information   provided by the submitting clinician and the standard processing schedule   is 27 5 hours  C  The specimen is received in formalin, labeled with the patient's name   and hospital number, and is designated ascending colon polyp ? ?2  The   specimen consists of 2 tan soft tissue fragments measuring 0 4 and 0 5 cm   admixed with multiple fragments of fecal matter  Entirely submitted  One   cassette  D   The specimen is received in formalin, labeled with the patient's name   and hospital number, and is designated hepatic flexure polyp  The   specimen consists of a single tan soft tissue fragment measuring 0 4 cm  Entirely submitted  One cassette  E  The specimen is received in formalin, labeled with the patient's name   and hospital number, and is designated transverse colon polyp  The   specimen consists of multiple tan soft tissue fragments measuring in   aggregate 1 x 0 8 x 0 1 cm  Entirely submitted  One cassette  Note: The estimated total formalin fixation time based upon information   provided by the submitting clinician and the standard processing schedule   is 27 25 hours  F  The specimen is received in formalin, labeled with the patient's name   and hospital number, and is designated rectal polyp ? ?3  The specimen   consists of 2 tan soft tissue fragments measuring 0 3 and 0 6 cm  Entirely   submitted  One cassette  Note: The estimated total formalin fixation time based upon information   provided by the submitting clinician and the standard processing schedule   is 27 0 hours  MAC       Discussion/Summary   The biopsies in the esophagus showed inflammation and Mustafa's esophagus  the colon polyps were benign  We will need to discuss further treatment options for the reflux/ Mustafa's and repeat the colonoscopy in one year  I did leave him a message

## 2018-01-13 VITALS
WEIGHT: 205 LBS | DIASTOLIC BLOOD PRESSURE: 80 MMHG | SYSTOLIC BLOOD PRESSURE: 132 MMHG | BODY MASS INDEX: 26.31 KG/M2 | HEIGHT: 74 IN

## 2018-01-13 VITALS
BODY MASS INDEX: 26 KG/M2 | WEIGHT: 202.63 LBS | DIASTOLIC BLOOD PRESSURE: 62 MMHG | OXYGEN SATURATION: 100 % | HEART RATE: 62 BPM | HEIGHT: 74 IN | SYSTOLIC BLOOD PRESSURE: 118 MMHG | RESPIRATION RATE: 14 BRPM

## 2018-01-15 NOTE — MISCELLANEOUS
Message  GI Reminder Recall [facility]:   Date: 11/17/2017   Dear Shanthi Molina:     Review of our records shows you are due for the following: Colonoscopy  Our records indicate that you are due at this time to have a follow-up examination for a colonoscopy  As you now, these tests are done to prevent colon cancer, a very common disease in the [place] and responsible for the thousands of patient deaths each year  We at [facility] are concerned for your health, and would very much appreciate you getting in touch with us at your earliest convenience, Again, this examination is vital to your proper health maintenance and for the prevention of cancer  Please call the following office to schedule your appointment:   [address] [place] [address] [phone]  We look forward to hearing from you!      Sincerely,     [facility]      Signatures   Electronically signed by : Román Miranda, ; Nov 17 2017 11:29AM EST                       (Author)

## 2018-01-15 NOTE — PROGRESS NOTES
Assessment    1  Benign essential hypertension (401 1) (I10)   2  Esophagitis, reflux (530 11) (K21 0)   3  Vascular headache (784 0) (G44 1)   4  Insomnia (780 52) (G47 00)    Plan  Benign essential hypertension    · (1) CBC/ PLT (NO DIFF); Status:Active; Requested HZM:63LPT9550;    · (1) COMPREHENSIVE METABOLIC PANEL; Status:Active; Requested QNA:43KDU6389;    · (1) LIPID PANEL, FASTING; Status:Active; Requested TLY:93UEJ9409;    · (1) TSH; Status:Active; Requested for:28Jan2016;    · A diet low in sodium and high in potassium, magnesium, and calcium can help your  blood pressure ; Status:Complete;   Done: 90MLL7608   · Begin a limited exercise program ; Status:Complete;   Done: 45JAM4941   · Continue with our present treatment plan ; Status:Complete;   Done: 68MGN9619    Discussion/Summary    47 yo man with well-controlled HTN, GERD and stable insomnia  He will remain on same regimen  Get FBW  Recheck 6 months or sooner prn      Possible side effects of new medications were reviewed with the patient/guardian today  The treatment plan was reviewed with the patient/guardian  The patient/guardian understands and agrees with the treatment plan      History of Present Illness  The patient is being seen for a routine clinic follow-up of gastroesophageal reflux disease  Symptoms:  no heartburn, no acid regurgitation, no sore throat and no odynophagia  The patient is currently asymptomatic  Associated symptoms:  no anorexia and no early satiety  Current treatment includes proton pump inhibitors and Omeprazole  By report, there is good compliance with treatment, good tolerance of treatment and good symptom control  The patient is being seen for follow-up of chronic insomnia  The patient reports no change in the condition  Interval symptoms:  stable difficulty falling asleep, denies difficulty staying asleep, denies unrefreshing sleep and denies fatigue  Medications include zolpidem (Ambien)     Medications: the patient is adherent to his medication regimen, but he denies medication side effects  Disease management:  the patient is doing well with his goals  The patient presents for follow-up of essential hypertension  The patient states he has been stable with his blood pressure control since the last visit  Symptoms: denies dyspnea, denies chest pain and denies lower extremity edema  Associated symptoms include no headache  Lifestyle: Diet: He consumes a diverse and healthy diet  Weight Issues: He does not have any weight concerns  Exercise: He does not exercise regularly  Smoking: He does not use tobacco Alcohol: He consumes alcohol  Drug Use: He denies drug use  Medications: the patient is adherent with his medication regimen  He denies medication side effects  Medication(s): an ACE inhibitor  Disease Management: the patient is doing well with his blood pressure goals  The patient is due for a lipid panel and a serum creatinine  Review of Systems    Constitutional: not feeling poorly, no recent weight gain and no recent weight loss  Cardiovascular: no chest pain and no extremity edema  Respiratory: no shortness of breath  Gastrointestinal: as noted in HPI  Neurological: HAs improved by avoidance of LED lights , but no headache  Psychiatric: Is not suicidal, no sleep disturbances, no anxiety or depression, no change in personality, no emotional problems  Active Problems    1  Benign essential hypertension (401 1) (I10)   2  Community acquired pneumonia (5) (J18 9)   3  Coronal hypospadias (752 61) (Q54 0)   4  Esophagitis, reflux (530 11) (K21 0)   5  Herpes zoster (053 9) (B02 9)   6  Insect bite (919 4,E906 4) (T14 8,W57  XXXA)   7  Insomnia (780 52) (G47 00)   8  Need for pneumococcal vaccine (V03 82) (Z23)   9  Need for prophylactic vaccination and inoculation against influenza (V04 81) (Z23)   10  Tendonitis (726 90) (M77 9)   11  Urinary tract infection (599 0) (N39 0)   12  Vascular headache (784 0) (G44 1)   13  Viral gastroenteritis (008 8) (A08 4)    Past Medical History    1  Need for prophylactic vaccination and inoculation against influenza (V04 81) (Z23)    Social History    · Former smoker (R86 23) (C46 618)   · Insect bite (919 4,E906 4) (X99 2,P58  XXXA)    Current Meds   1  Butalbital-Aspirin-Caffeine -40 MG Oral Capsule; TAKE 1 TO 2 CAPSULES EVERY   4 HOURS AS NEEDED; Therapy: 66BUP8242 to (Evaluate:06Jun2015); Last Rx:04Jun2015 Ordered   2  Cyclobenzaprine HCl - 5 MG Oral Tablet; Therapy: 50Eeh9974 to (Evaluate:28Oct2014) Recorded   3  Lisinopril 10 MG Oral Tablet; take 1 tablet every day; Therapy: 32Wdf9213 to (Evaluate:27Dec2015)  Requested for: 54RPL4778; Last   Rx:27Nov2015 Ordered   4  Omeprazole 20 MG Oral Capsule Delayed Release; take 1 capsule daily; Therapy: 72Fqd5239 to (Heena Rivers)  Requested for: 31PRM2988; Last   Rx:04Jun2015 Ordered   5  Zolpidem Tartrate 10 MG Oral Tablet; take 1 tablet by mouth at bedtime for sleep; Therapy: 00RIY6063 to (Heena Rivers)  Requested for: 05CFZ8641; Last   Rx:04Jun2015 Ordered   6  Zolpidem Tartrate 5 MG Oral Tablet; TAKE 2 TABLETS AT BEDTIME AS NEEDED FOR   SLEEP; Therapy: (Recorded:86Map1889) to Recorded    Allergies    1  Naproxen TABS    Vitals  Vital Signs [Data Includes: Current Encounter]    Recorded: 50QWT8818 73:15TO   Systolic 752   Diastolic 80   Height 6 ft 1 5 in   Weight 220 lb    BMI Calculated 28 63   BSA Calculated 2 25     Physical Exam    Constitutional   General appearance: No acute distress, well appearing and well nourished  Pulmonary   Respiratory effort: No increased work of breathing or signs of respiratory distress  Auscultation of lungs: Clear to auscultation, equal breath sounds bilaterally, no wheezes, no rales, no rhonci  Cardiovascular   Auscultation of heart: Normal rate and rhythm, normal S1 and S2, without murmurs    The heart rate was normal  The rhythm was regular  Heart sounds: normal S1, normal S2 and no gallop heard  no murmurs were heard  Examination of extremities for edema and/or varicosities: Normal     Carotid pulses: Normal   right 2+, no bruit heard over the right carotid, left 2+ and no bruit heard over the left carotid  Lymphatic   Palpation of lymph nodes in neck: No lymphadenopathy      Psychiatric   Orientation to person, place and time: Normal     Mood and affect: Normal          Signatures   Electronically signed by : LASHELL Sarkar ; Jan 28 2016  5:42PM EST                       (Author)

## 2018-01-17 NOTE — RESULT NOTES
Message   please schedule EGD at hospital for December or January  Thanks  Verified Results  (1) TISSUE EXAM [id number] 10:27AM Adeel Cruz     Test Name Result Flag Reference   LAB AP CASE REPORT (Report)     Surgical Pathology Report             Case: [id number]                   Authorizing Provider: Abhijeet Fernando MD       Collected:      09/16/2016 1027        Ordering Location:   [facility]    Received:      09/19/2016 [address] [place] [address] Endoscopy                            Pathologist:      Terrell Power MD                                 Specimens:  A) - Stomach, cold bx, gastric body and antrum, gastritis                       B) - Esophagogastric junction, cold bx   LAB AP FINAL DIAGNOSIS (Report)     A  Gastric body and antral gastritis (biopsy):    - Gastric oxyntic and antral mucosa with no significant pathologic   abnormality      - Immunostain for H  pylori (with appropriate positive control) is   negative  - No intestinal metaplasia, dysplasia or neoplasia identified  B  Esophagogastric junction (biopsy):     - Cardiac gastric mucosa with intestinal metaplasia (few goblet cells   present), see note  - No squamous mucosa present for evaluation     - No dysplasia or neoplasia identified  Electronically signed by Terrell Power MD on 9/21/2016 at 12:53 PM   LAB AP NOTE (Report)     - Endoscopic correlation is recommended regarding the EG junction biopsy   (part B)  If the biopsy is from the gastric cardia, the findings represent   gastric intestinal metaplasia  If the biopsy is from the tubular   esophagus, it is compatible with Mustafa's esophagus  No dysplasia is   identified  - Interpretation performed at [facility], [address] [place] [address]  - Intradepartmental consultation concurs with the diagnosis  LAB AP SURGICAL ADDITIONAL INFORMATION (Report)     These tests were developed and their performance characteristics   determined by [facility] or [facility]  They may not be cleared or approved by the [place]  Food and   Drug Administration  The FDA has determined that such clearance or   approval is not necessary  These tests are used for clinical purposes  They should not be regarded as investigational or for research  This   laboratory has been approved by CLIA 88, designated as a high-complexity   laboratory and is qualified to perform these tests  LAB AP GROSS DESCRIPTION (Report)     A  The specimen is received in formalin, labeled with the patient's name   and hospital number, and is designated cold biopsy, gastric body and   antrum, gastritis, are two irregularly shaped fragments of tan soft   tissue measuring 0 4 and 0 3 cm in greatest dimension  Entirely submitted  One cassette  B  The specimen is received in formalin, labeled with the patient's name   and hospital number, and is designated cold biopsy, are two irregularly   shaped fragments of tan soft tissue measuring 0 3 and 0 2 cm in greatest   dimension  Entirely submitted  One cassette  Note: The estimated total formalin fixation time based upon information   provided by the submitting clinician and the standard processing schedule   is over 72 hours  RLR   LAB AP CLINICAL INFORMATION      Gastro-esophageal reflux disease with esophagitis       Discussion/Summary   the biopsies were benign  I would recommend to continue the tratment for reflux and repeat EGD in 3 months
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
## 2018-01-30 ENCOUNTER — IMMUNIZATION (OUTPATIENT)
Dept: FAMILY MEDICINE CLINIC | Facility: CLINIC | Age: 53
End: 2018-01-30
Payer: COMMERCIAL

## 2018-01-30 DIAGNOSIS — Z23 FLU VACCINE NEED: Primary | ICD-10-CM

## 2018-01-30 PROCEDURE — 90686 IIV4 VACC NO PRSV 0.5 ML IM: CPT | Performed by: FAMILY MEDICINE

## 2018-01-31 DIAGNOSIS — G47.00 INSOMNIA, UNSPECIFIED TYPE: Primary | ICD-10-CM

## 2018-01-31 DIAGNOSIS — M51.26 LUMBAR HERNIATED DISC: ICD-10-CM

## 2018-01-31 RX ORDER — BUTALBITAL, ASPIRIN, AND CAFFEINE 50; 325; 40 MG/1; MG/1; MG/1
1 CAPSULE ORAL EVERY 4 HOURS PRN
Qty: 30 CAPSULE | Refills: 0 | Status: SHIPPED | OUTPATIENT
Start: 2018-01-31 | End: 2018-09-28 | Stop reason: ALTCHOICE

## 2018-01-31 RX ORDER — ZOLPIDEM TARTRATE 10 MG/1
10 TABLET ORAL
Qty: 30 TABLET | Refills: 0 | Status: SHIPPED | OUTPATIENT
Start: 2018-01-31 | End: 2018-02-23 | Stop reason: SDUPTHER

## 2018-01-31 RX ORDER — BUTALBITAL, ASPIRIN, AND CAFFEINE 50; 325; 40 MG/1; MG/1; MG/1
1 CAPSULE ORAL EVERY 4 HOURS PRN
COMMUNITY
Start: 2015-06-04 | End: 2018-01-31 | Stop reason: SDUPTHER

## 2018-02-16 DIAGNOSIS — J11.1 INFLUENZA: Primary | ICD-10-CM

## 2018-02-16 RX ORDER — OSELTAMIVIR PHOSPHATE 75 MG/1
75 CAPSULE ORAL EVERY 12 HOURS SCHEDULED
Qty: 10 CAPSULE | Refills: 0 | Status: SHIPPED | OUTPATIENT
Start: 2018-02-16 | End: 2018-02-21

## 2018-02-22 RX ORDER — BACLOFEN 10 MG/1
5 TABLET ORAL
COMMUNITY
Start: 2017-05-05 | End: 2018-02-23

## 2018-02-22 RX ORDER — PREGABALIN 50 MG/1
50 CAPSULE ORAL
COMMUNITY
Start: 2017-05-05 | End: 2018-02-23

## 2018-02-22 RX ORDER — MELOXICAM 7.5 MG/1
7.5 TABLET ORAL
COMMUNITY
End: 2018-02-23

## 2018-02-23 ENCOUNTER — OFFICE VISIT (OUTPATIENT)
Dept: FAMILY MEDICINE CLINIC | Facility: CLINIC | Age: 53
End: 2018-02-23
Payer: COMMERCIAL

## 2018-02-23 VITALS
BODY MASS INDEX: 26.16 KG/M2 | DIASTOLIC BLOOD PRESSURE: 80 MMHG | WEIGHT: 201 LBS | TEMPERATURE: 96.5 F | SYSTOLIC BLOOD PRESSURE: 128 MMHG

## 2018-02-23 DIAGNOSIS — F51.01 PRIMARY INSOMNIA: Primary | ICD-10-CM

## 2018-02-23 DIAGNOSIS — I10 BENIGN ESSENTIAL HYPERTENSION: ICD-10-CM

## 2018-02-23 DIAGNOSIS — G47.00 INSOMNIA, UNSPECIFIED TYPE: ICD-10-CM

## 2018-02-23 DIAGNOSIS — G44.1 VASCULAR HEADACHE: ICD-10-CM

## 2018-02-23 PROBLEM — R79.89 ABNORMAL LFTS (LIVER FUNCTION TESTS): Status: ACTIVE | Noted: 2017-03-10

## 2018-02-23 PROBLEM — M50.90 CERVICAL DISC DISEASE: Status: ACTIVE | Noted: 2017-03-09

## 2018-02-23 PROBLEM — R20.0 LOWER EXTREMITY NUMBNESS: Status: ACTIVE | Noted: 2017-05-03

## 2018-02-23 PROBLEM — R26.2 AMBULATORY DYSFUNCTION: Status: ACTIVE | Noted: 2017-05-04

## 2018-02-23 PROBLEM — M54.9 BACK PAIN: Status: ACTIVE | Noted: 2017-05-03

## 2018-02-23 PROBLEM — R29.2 HYPERREFLEXIA OF LOWER EXTREMITY: Status: ACTIVE | Noted: 2017-05-04

## 2018-02-23 PROBLEM — R13.10 DYSPHAGIA: Status: ACTIVE | Noted: 2017-04-10

## 2018-02-23 PROCEDURE — 99214 OFFICE O/P EST MOD 30 MIN: CPT | Performed by: FAMILY MEDICINE

## 2018-02-23 PROCEDURE — 36415 COLL VENOUS BLD VENIPUNCTURE: CPT | Performed by: FAMILY MEDICINE

## 2018-02-23 RX ORDER — ZOLPIDEM TARTRATE 10 MG/1
10 TABLET ORAL
Qty: 30 TABLET | Refills: 5 | Status: SHIPPED | OUTPATIENT
Start: 2018-02-23 | End: 2018-09-04 | Stop reason: SDUPTHER
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
## 2018-02-23 NOTE — ASSESSMENT & PLAN NOTE
He has frequent recurrent headaches  He has had no emergency room visits for last 2 years  He uses Fiorinal sparingly which has controlled his headaches  We will continue his use of this

## 2018-02-23 NOTE — ASSESSMENT & PLAN NOTE
He is maintained on lisinopril 10 mg daily for his blood pressure which is well controlled today  We are going to get CBC CMP lipids and TSH
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
## 2018-02-23 NOTE — PATIENT INSTRUCTIONS
Will follow up by phone next week with the results of his blood work  He is going to continue his current regimen for now  Medication refilled as needed

## 2018-03-02 DIAGNOSIS — I10 ESSENTIAL HYPERTENSION: Primary | ICD-10-CM

## 2018-03-02 LAB
ALBUMIN SERPL-MCNC: 4.8 G/DL (ref 3.6–5.1)
ALBUMIN/GLOB SERPL: 1.8 (CALC) (ref 1–2.5)
ALP SERPL-CCNC: 120 U/L (ref 40–115)
ALT SERPL-CCNC: 32 U/L (ref 9–46)
AST SERPL-CCNC: 29 U/L (ref 10–35)
BASOPHILS # BLD AUTO: 73 CELLS/UL (ref 0–200)
BASOPHILS NFR BLD AUTO: 1.1 %
BILIRUB SERPL-MCNC: 0.4 MG/DL (ref 0.2–1.2)
BUN SERPL-MCNC: 18 MG/DL (ref 7–25)
BUN/CREAT SERPL: ABNORMAL (CALC) (ref 6–22)
CALCIUM SERPL-MCNC: 9.8 MG/DL (ref 8.6–10.3)
CHLORIDE SERPL-SCNC: 102 MMOL/L (ref 98–110)
CHOLEST SERPL-MCNC: 234 MG/DL
CHOLEST/HDLC SERPL: 3.7 (CALC)
CO2 SERPL-SCNC: 21 MMOL/L (ref 20–31)
CREAT SERPL-MCNC: 0.96 MG/DL (ref 0.7–1.33)
EOSINOPHIL # BLD AUTO: 198 CELLS/UL (ref 15–500)
EOSINOPHIL NFR BLD AUTO: 3 %
ERYTHROCYTE [DISTWIDTH] IN BLOOD BY AUTOMATED COUNT: 12.9 % (ref 11–15)
GLOBULIN SER CALC-MCNC: 2.6 G/DL (CALC) (ref 1.9–3.7)
GLUCOSE SERPL-MCNC: 110 MG/DL (ref 65–99)
HBA1C MFR BLD: 5.6 % OF TOTAL HGB
HCT VFR BLD AUTO: 45.9 % (ref 38.5–50)
HDLC SERPL-MCNC: 63 MG/DL
HGB BLD-MCNC: 15.8 G/DL (ref 13.2–17.1)
LDLC SERPL CALC-MCNC: 147 MG/DL (CALC)
LYMPHOCYTES # BLD AUTO: 1571 CELLS/UL (ref 850–3900)
LYMPHOCYTES NFR BLD AUTO: 23.8 %
MCH RBC QN AUTO: 30.3 PG (ref 27–33)
MCHC RBC AUTO-ENTMCNC: 34.4 G/DL (ref 32–36)
MCV RBC AUTO: 88.1 FL (ref 80–100)
MONOCYTES # BLD AUTO: 528 CELLS/UL (ref 200–950)
MONOCYTES NFR BLD AUTO: 8 %
NEUTROPHILS # BLD AUTO: 4231 CELLS/UL (ref 1500–7800)
NEUTROPHILS NFR BLD AUTO: 64.1 %
NONHDLC SERPL-MCNC: 171 MG/DL (CALC)
PLATELET # BLD AUTO: 236 THOUSAND/UL (ref 140–400)
PMV BLD REES-ECKER: 9.9 FL (ref 7.5–12.5)
POTASSIUM SERPL-SCNC: 4.4 MMOL/L (ref 3.5–5.3)
PROT SERPL-MCNC: 7.4 G/DL (ref 6.1–8.1)
RBC # BLD AUTO: 5.21 MILLION/UL (ref 4.2–5.8)
REF LAB TEST NAME: NORMAL
REF LAB TEST: NORMAL
SL AMB CLIENT CONTACT: NORMAL
SL AMB EGFR AFRICAN AMERICAN: 104 ML/MIN/1.73M2
SL AMB EGFR NON AFRICAN AMERICAN: 90 ML/MIN/1.73M2
SODIUM SERPL-SCNC: 138 MMOL/L (ref 135–146)
TRIGL SERPL-MCNC: 121 MG/DL
TSH SERPL-ACNC: 0.99 MIU/L (ref 0.4–4.5)
WBC # BLD AUTO: 6.6 THOUSAND/UL (ref 3.8–10.8)

## 2018-03-02 RX ORDER — LISINOPRIL 10 MG/1
TABLET ORAL
Qty: 90 TABLET | Refills: 1 | Status: SHIPPED | OUTPATIENT
Start: 2018-03-02 | End: 2019-02-23 | Stop reason: SDUPTHER

## 2018-09-04 DIAGNOSIS — G47.00 INSOMNIA, UNSPECIFIED TYPE: ICD-10-CM

## 2018-09-05 RX ORDER — ZOLPIDEM TARTRATE 10 MG/1
TABLET ORAL
Qty: 30 TABLET | Refills: 1 | OUTPATIENT
Start: 2018-09-05 | End: 2018-09-28 | Stop reason: SDUPTHER

## 2018-09-24 RX ORDER — OMEPRAZOLE 40 MG/1
CAPSULE, DELAYED RELEASE ORAL
COMMUNITY
Start: 2018-07-20 | End: 2018-10-17 | Stop reason: SDUPTHER

## 2018-09-28 ENCOUNTER — OFFICE VISIT (OUTPATIENT)
Dept: FAMILY MEDICINE CLINIC | Facility: CLINIC | Age: 53
End: 2018-09-28
Payer: COMMERCIAL

## 2018-09-28 VITALS
WEIGHT: 186 LBS | BODY MASS INDEX: 24.21 KG/M2 | DIASTOLIC BLOOD PRESSURE: 80 MMHG | TEMPERATURE: 96 F | HEART RATE: 56 BPM | OXYGEN SATURATION: 98 % | SYSTOLIC BLOOD PRESSURE: 118 MMHG

## 2018-09-28 DIAGNOSIS — G47.00 INSOMNIA, UNSPECIFIED TYPE: ICD-10-CM

## 2018-09-28 DIAGNOSIS — Z11.59 NEED FOR HEPATITIS C SCREENING TEST: ICD-10-CM

## 2018-09-28 DIAGNOSIS — E78.2 MIXED HYPERLIPIDEMIA: ICD-10-CM

## 2018-09-28 DIAGNOSIS — I10 BENIGN ESSENTIAL HYPERTENSION: ICD-10-CM

## 2018-09-28 DIAGNOSIS — G44.1 VASCULAR HEADACHE: ICD-10-CM

## 2018-09-28 DIAGNOSIS — R73.01 IMPAIRED FASTING GLUCOSE: Primary | ICD-10-CM

## 2018-09-28 DIAGNOSIS — Z13.0 SCREENING FOR IRON DEFICIENCY ANEMIA: ICD-10-CM

## 2018-09-28 LAB — SL AMB POCT HEMOGLOBIN AIC: 5.7

## 2018-09-28 PROCEDURE — 99214 OFFICE O/P EST MOD 30 MIN: CPT | Performed by: FAMILY MEDICINE

## 2018-09-28 PROCEDURE — 3079F DIAST BP 80-89 MM HG: CPT | Performed by: FAMILY MEDICINE

## 2018-09-28 PROCEDURE — 83036 HEMOGLOBIN GLYCOSYLATED A1C: CPT | Performed by: FAMILY MEDICINE

## 2018-09-28 PROCEDURE — 3074F SYST BP LT 130 MM HG: CPT | Performed by: FAMILY MEDICINE

## 2018-09-28 RX ORDER — ZOLPIDEM TARTRATE 10 MG/1
10 TABLET ORAL
Qty: 30 TABLET | Refills: 2 | OUTPATIENT
Start: 2018-09-28 | End: 2018-10-08 | Stop reason: SDUPTHER

## 2018-09-28 NOTE — ASSESSMENT & PLAN NOTE
Blood pressure is well controlled today  He is going to continue on lisinopril  We are going to get CBC CMP today

## 2018-09-28 NOTE — ASSESSMENT & PLAN NOTE
He has a history of vascular headaches for which he is taking Fiorinal in the past   His neurologist prefers that he discontinue Fiorinal   The patient requests referral to headache specialists which we will accommodate

## 2018-09-28 NOTE — ASSESSMENT & PLAN NOTE
Patient is a history of hyperlipidemia  We are going to get a lipid profile on discuss results with him when results are available  He agrees

## 2018-10-05 LAB
ALBUMIN SERPL-MCNC: 4.1 G/DL (ref 3.6–5.1)
ALBUMIN/GLOB SERPL: 1.6 (CALC) (ref 1–2.5)
ALP SERPL-CCNC: 112 U/L (ref 40–115)
ALT SERPL-CCNC: 38 U/L (ref 9–46)
AST SERPL-CCNC: 22 U/L (ref 10–35)
BILIRUB SERPL-MCNC: 0.5 MG/DL (ref 0.2–1.2)
BUN SERPL-MCNC: 18 MG/DL (ref 7–25)
BUN/CREAT SERPL: NORMAL (CALC) (ref 6–22)
CALCIUM SERPL-MCNC: 9.1 MG/DL (ref 8.6–10.3)
CHLORIDE SERPL-SCNC: 103 MMOL/L (ref 98–110)
CHOLEST SERPL-MCNC: 163 MG/DL
CHOLEST/HDLC SERPL: 2.9 (CALC)
CO2 SERPL-SCNC: 27 MMOL/L (ref 20–32)
CREAT SERPL-MCNC: 0.96 MG/DL (ref 0.7–1.33)
ERYTHROCYTE [DISTWIDTH] IN BLOOD BY AUTOMATED COUNT: 13.6 % (ref 11–15)
GLOBULIN SER CALC-MCNC: 2.5 G/DL (CALC) (ref 1.9–3.7)
GLUCOSE SERPL-MCNC: 89 MG/DL (ref 65–99)
HCT VFR BLD AUTO: 41.1 % (ref 38.5–50)
HCV AB S/CO SERPL IA: 28.1
HCV AB SERPL QL IA: REACTIVE
HCV RNA SERPL NAA+PROBE-ACNC: NORMAL IU/ML
HCV RNA SERPL NAA+PROBE-LOG IU: NORMAL LOGIU/ML
HDLC SERPL-MCNC: 57 MG/DL
HGB BLD-MCNC: 13.3 G/DL (ref 13.2–17.1)
LDLC SERPL CALC-MCNC: 91 MG/DL (CALC)
MCH RBC QN AUTO: 31.2 PG (ref 27–33)
MCHC RBC AUTO-ENTMCNC: 32.4 G/DL (ref 32–36)
MCV RBC AUTO: 96.5 FL (ref 80–100)
NONHDLC SERPL-MCNC: 106 MG/DL (CALC)
PLATELET # BLD AUTO: 281 THOUSAND/UL (ref 140–400)
PMV BLD REES-ECKER: 9.2 FL (ref 7.5–12.5)
POTASSIUM SERPL-SCNC: 4.6 MMOL/L (ref 3.5–5.3)
PROT SERPL-MCNC: 6.6 G/DL (ref 6.1–8.1)
RBC # BLD AUTO: 4.26 MILLION/UL (ref 4.2–5.8)
SL AMB EGFR AFRICAN AMERICAN: 104 ML/MIN/1.73M2
SL AMB EGFR NON AFRICAN AMERICAN: 90 ML/MIN/1.73M2
SODIUM SERPL-SCNC: 140 MMOL/L (ref 135–146)
TRIGL SERPL-MCNC: 67 MG/DL
WBC # BLD AUTO: 6.8 THOUSAND/UL (ref 3.8–10.8)

## 2018-10-08 DIAGNOSIS — G47.00 INSOMNIA, UNSPECIFIED TYPE: ICD-10-CM

## 2018-10-08 RX ORDER — ZOLPIDEM TARTRATE 10 MG/1
TABLET ORAL
Qty: 30 TABLET | Refills: 0 | Status: SHIPPED | OUTPATIENT
Start: 2018-10-08 | End: 2018-11-05 | Stop reason: SDUPTHER

## 2018-10-17 DIAGNOSIS — K22.70 BARRETT'S ESOPHAGUS WITHOUT DYSPLASIA: Primary | ICD-10-CM

## 2018-10-17 RX ORDER — OMEPRAZOLE 40 MG/1
CAPSULE, DELAYED RELEASE ORAL
Qty: 180 CAPSULE | Refills: 1 | Status: SHIPPED | OUTPATIENT
Start: 2018-10-17 | End: 2020-01-07 | Stop reason: SDUPTHER

## 2018-10-17 NOTE — TELEPHONE ENCOUNTER
Left message on patients voicemail for pt to call to schedule a follow up - per Sen NUGENT  Pt has not been seen in the office for over a year

## 2018-10-17 NOTE — TELEPHONE ENCOUNTER
Can you please call him for a follow up? He hasn't been seen in over a year; requested prescription which I filled until he can come into the office

## 2018-10-18 ENCOUNTER — TELEPHONE (OUTPATIENT)
Dept: GASTROENTEROLOGY | Facility: MEDICAL CENTER | Age: 53
End: 2018-10-18

## 2018-10-18 NOTE — TELEPHONE ENCOUNTER
The patient called back and stated he would call back to schedule an appointment  He has many appointments and does not know his schedule at this time

## 2018-10-18 NOTE — TELEPHONE ENCOUNTER
Called and left second message on his voicemail for the patient to schedule an office visit  Per Luz Elena's note below  Letter mailed

## 2018-10-19 PROBLEM — R76.8 HEPATITIS C ANTIBODY TEST POSITIVE: Status: ACTIVE | Noted: 2018-10-19

## 2018-10-29 ENCOUNTER — TELEPHONE (OUTPATIENT)
Dept: NEUROLOGY | Facility: CLINIC | Age: 53
End: 2018-10-29

## 2018-11-02 ENCOUNTER — OFFICE VISIT (OUTPATIENT)
Dept: GASTROENTEROLOGY | Facility: MEDICAL CENTER | Age: 53
End: 2018-11-02
Payer: COMMERCIAL

## 2018-11-02 VITALS
BODY MASS INDEX: 24.73 KG/M2 | WEIGHT: 190 LBS | TEMPERATURE: 98.5 F | SYSTOLIC BLOOD PRESSURE: 120 MMHG | DIASTOLIC BLOOD PRESSURE: 68 MMHG | HEART RATE: 64 BPM

## 2018-11-02 DIAGNOSIS — K44.9 HIATAL HERNIA: Primary | ICD-10-CM

## 2018-11-02 DIAGNOSIS — D12.6 TUBULAR ADENOMA OF COLON: ICD-10-CM

## 2018-11-02 DIAGNOSIS — K21.9 GASTROESOPHAGEAL REFLUX DISEASE WITHOUT ESOPHAGITIS: Chronic | ICD-10-CM

## 2018-11-02 DIAGNOSIS — R76.8 HEPATITIS C ANTIBODY TEST POSITIVE: ICD-10-CM

## 2018-11-02 DIAGNOSIS — K22.70 BARRETT'S ESOPHAGUS WITHOUT DYSPLASIA: ICD-10-CM

## 2018-11-02 PROCEDURE — 99214 OFFICE O/P EST MOD 30 MIN: CPT | Performed by: INTERNAL MEDICINE

## 2018-11-02 RX ORDER — SODIUM, POTASSIUM,MAG SULFATES 17.5-3.13G
1 SOLUTION, RECONSTITUTED, ORAL ORAL ONCE
Qty: 2 BOTTLE | Refills: 0 | Status: SHIPPED | OUTPATIENT
Start: 2018-11-02 | End: 2019-02-22 | Stop reason: ALTCHOICE

## 2018-11-02 RX ORDER — HYDROCODONE BITARTRATE AND ACETAMINOPHEN 10; 325 MG/1; MG/1
1 TABLET ORAL 4 TIMES DAILY PRN
Refills: 0 | COMMUNITY
Start: 2018-10-12

## 2018-11-02 NOTE — PROGRESS NOTES
Assessment/Plan:    Gastroesophageal reflux disease without esophagitis  He has a history of gastroesophageal reflux disease and short-segment Mustafa's esophagus without dysplasia  Recommend to continue omeprazole for the time being  Dietary and lifestyle changes were recommended  Repeat endoscopy  Mustafa's esophagus without dysplasia  Continue omeprazole for the time being  Last EGD was 2 years ago  No dysplasia was seen  Repeat endoscopy in the next few months  Hiatal hernia  This likely contributes to his gastroesophageal reflux disease  Continue omeprazole  However may consider surgery in the future if he would like to come off the omeprazole for the symptoms are persistent despite being on PPI  Tubular adenoma of colon  Last colonoscopy had a fair prep with multiple polyps in 2016  Recommend repeat colonoscopy at this time  Hepatitis C antibody test positive  Recently checked for hepatitis C and his antibody was positive  However it appears that his RNA is negative  He has likely cleared the virus and this is evidence of prior infection  I would recommend checking for hepatitis B surface antibody, surface antigen, core antibody and hepatitis a antibody  Recommend LFTs in the future along with CBC and an INR to rule out any underlying liver disease  Check an ultrasound of the right upper quadrant as well  If all negative then no further workup would be required         Diagnoses and all orders for this visit:    Hiatal hernia    Gastroesophageal reflux disease without esophagitis  -     Case request operating room: EGD AND COLONOSCOPY; Standing  -     Case request operating room: EGD AND COLONOSCOPY    Mustafa's esophagus without dysplasia  -     Case request operating room: EGD AND COLONOSCOPY; Standing  -     Case request operating room: EGD AND COLONOSCOPY    Tubular adenoma of colon  -     SUPREP BOWEL PREP KIT 17 5-3 13-1 6 GM/180ML SOLN; Take 1 kit by mouth once for 1 dose Please follow instructions as per the office   -     Case request operating room: EGD AND COLONOSCOPY; Standing  -     Case request operating room: EGD AND COLONOSCOPY    Hepatitis C antibody test positive  -     Hepatitis B surface antibody; Future  -     Hepatitis B surface antigen; Future  -     Hepatitis B core antibody, total; Future  -     Hepatitis B core antibody, IgM; Future  -     Hepatitis A antibody, total; Future  -     Cancel: Hepatitis C RNA, quantitative, PCR; Future  -     Cancel: Hepatitis C genotype; Future  -     US right upper quadrant; Future  -     Comprehensive metabolic panel; Future  -     CBC and differential; Future  -     Protime-INR; Future    Other orders  -     HYDROcodone-acetaminophen (NORCO)  mg per tablet; Take 1 tablet by mouth 4 (four) times a day as needed  -     Diet NPO; Sips with meds; Standing  -     Void on call to OR; Standing  -     Insert peripheral IV; Standing          Subjective:      Patient ID: Bret Corbin is a 48 y o  male  HPI     He has had lumbar and cervical spinal surgery done and has lost weight since then  GERD : This has improved since his weight loss  He is on omeprazole 40 mg daily  His previous (12/16) EGD had shown short segment uribe's esophagus and without dysplasia  He had a large hiatal hernia  Colon polyps : his last colonoscopy in 12/16 had multiple colon polyps but prep was fair  Repeat was recommended in one year and he has not had that done yet  He used to have constipation and that has improved since he has started to come down on the opioids with using medical marijuana  No bleeding  He was recently diagnosed with hepatitis C antibody positive recently  LFT's are not reported recently but previously were normal   His hepatitis-C RNA was negative  No significant alcohol intake now       The following portions of the patient's history were reviewed and updated as appropriate: allergies, current medications, past family history, past medical history, past social history, past surgical history and problem list     Review of Systems   Constitutional: Negative  HENT: Negative  Eyes: Negative  Respiratory: Negative  Cardiovascular: Negative  Gastrointestinal:        See HPI   Endocrine: Negative  Genitourinary: Negative  Musculoskeletal: Negative  Skin: Negative  Allergic/Immunologic: Negative  Neurological: Negative  Hematological: Negative  Psychiatric/Behavioral: Negative  All other systems reviewed and are negative  Objective:      /68 (BP Location: Left arm, Patient Position: Sitting, Cuff Size: Adult)   Pulse 64   Temp 98 5 °F (36 9 °C) (Tympanic)   Wt 86 2 kg (190 lb)   BMI 24 73 kg/m²          Physical Exam   Constitutional: He is oriented to person, place, and time  Vital signs are normal  He appears well-developed and well-nourished  HENT:   Head: Normocephalic and atraumatic  Eyes: Pupils are equal, round, and reactive to light  Conjunctivae are normal  No scleral icterus  Neck: Normal range of motion  Cardiovascular: Normal rate, regular rhythm and normal heart sounds  Pulmonary/Chest: Effort normal and breath sounds normal  No respiratory distress  Abdominal: Soft  Normal appearance and bowel sounds are normal  He exhibits no distension, no ascites and no mass  There is no hepatosplenomegaly  There is no tenderness  No hernia  Musculoskeletal: Normal range of motion  Lymphadenopathy:     He has no cervical adenopathy  Neurological: He is alert and oriented to person, place, and time  Skin: Skin is warm  Psychiatric: He has a normal mood and affect   His behavior is normal  Thought content normal

## 2018-11-02 NOTE — PATIENT INSTRUCTIONS
The patient is scheduled on 1/3/19 for a colon/egd with Dr Fabrizio Howard at [address] [place] [address] instructions were gone over with by the MA   His US is scheduled on 11/9/18 at [facility])

## 2018-11-02 NOTE — LETTER
November 8, 2018     Santos Ryan MD  [address] [facility] [address]    Patient: Mel Squires   YOB: 1965   Date of Visit: 11/2/2018       Dear Dr Quyen Meredith Recipients: Thank you for referring Nini Boo to me for evaluation  Below are my notes for this consultation  If you have questions, please do not hesitate to call me  I look forward to following your patient along with you           Sincerely,        Nadja Herrera MD        CC: No Recipients

## 2018-11-05 DIAGNOSIS — G47.00 INSOMNIA, UNSPECIFIED TYPE: ICD-10-CM

## 2018-11-05 RX ORDER — ZOLPIDEM TARTRATE 10 MG/1
TABLET ORAL
Qty: 30 TABLET | Refills: 0 | Status: SHIPPED | OUTPATIENT
Start: 2018-11-05 | End: 2018-11-08 | Stop reason: SDUPTHER

## 2018-11-08 DIAGNOSIS — G47.00 INSOMNIA, UNSPECIFIED TYPE: ICD-10-CM

## 2018-11-08 RX ORDER — ZOLPIDEM TARTRATE 10 MG/1
TABLET ORAL
Qty: 30 TABLET | Refills: 4 | Status: SHIPPED | OUTPATIENT
Start: 2018-11-08 | End: 2019-02-22 | Stop reason: ALTCHOICE

## 2018-11-08 NOTE — ASSESSMENT & PLAN NOTE
Recently checked for hepatitis C and his antibody was positive  However it appears that his RNA is negative  He has likely cleared the virus and this is evidence of prior infection  I would recommend checking for hepatitis B surface antibody, surface antigen, core antibody and hepatitis a antibody  Recommend LFTs in the future along with CBC and an INR to rule out any underlying liver disease  Check an ultrasound of the right upper quadrant as well  If all negative then no further workup would be required

## 2018-11-08 NOTE — ASSESSMENT & PLAN NOTE
Last colonoscopy had a fair prep with multiple polyps in 2016  Recommend repeat colonoscopy at this time

## 2018-11-08 NOTE — ASSESSMENT & PLAN NOTE
Continue omeprazole for the time being  Last EGD was 2 years ago  No dysplasia was seen  Repeat endoscopy in the next few months

## 2018-11-08 NOTE — ASSESSMENT & PLAN NOTE
He has a history of gastroesophageal reflux disease and short-segment Mustafa's esophagus without dysplasia  Recommend to continue omeprazole for the time being  Dietary and lifestyle changes were recommended  Repeat endoscopy

## 2018-11-08 NOTE — ASSESSMENT & PLAN NOTE
This likely contributes to his gastroesophageal reflux disease  Continue omeprazole  However may consider surgery in the future if he would like to come off the omeprazole for the symptoms are persistent despite being on PPI

## 2018-11-09 ENCOUNTER — TRANSCRIBE ORDERS (OUTPATIENT)
Dept: RADIOLOGY | Facility: HOSPITAL | Age: 53
End: 2018-11-09

## 2018-11-09 ENCOUNTER — HOSPITAL ENCOUNTER (OUTPATIENT)
Dept: RADIOLOGY | Facility: HOSPITAL | Age: 53
Discharge: HOME | End: 2018-11-09
Attending: INTERNAL MEDICINE
Payer: COMMERCIAL

## 2018-11-09 DIAGNOSIS — R76.8 HEPATITIS C ANTIBODY TEST POSITIVE: ICD-10-CM

## 2018-11-09 PROCEDURE — 76705 ECHO EXAM OF ABDOMEN: CPT

## 2018-12-04 ENCOUNTER — OFFICE VISIT (OUTPATIENT)
Dept: NEUROLOGY | Facility: CLINIC | Age: 53
End: 2018-12-04
Payer: COMMERCIAL

## 2018-12-04 VITALS
HEART RATE: 60 BPM | RESPIRATION RATE: 14 BRPM | HEIGHT: 75 IN | WEIGHT: 195 LBS | DIASTOLIC BLOOD PRESSURE: 66 MMHG | BODY MASS INDEX: 24.25 KG/M2 | SYSTOLIC BLOOD PRESSURE: 118 MMHG

## 2018-12-04 DIAGNOSIS — M51.36 LUMBAR DEGENERATIVE DISC DISEASE: ICD-10-CM
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
## 2018-12-04 DIAGNOSIS — G44.1 VASCULAR HEADACHE: ICD-10-CM

## 2018-12-04 DIAGNOSIS — G43.719 INTRACTABLE CHRONIC MIGRAINE WITHOUT AURA AND WITHOUT STATUS MIGRAINOSUS: Primary | ICD-10-CM

## 2018-12-04 DIAGNOSIS — Z98.890 HISTORY OF LUMBAR LAMINECTOMY: ICD-10-CM

## 2018-12-04 DIAGNOSIS — Z98.890 H/O CERVICAL SPINE SURGERY: ICD-10-CM

## 2018-12-04 PROCEDURE — 99244 OFF/OP CNSLTJ NEW/EST MOD 40: CPT | Performed by: PSYCHIATRY & NEUROLOGY

## 2018-12-04 RX ORDER — AMITRIPTYLINE HYDROCHLORIDE 10 MG/1
TABLET, FILM COATED ORAL
Qty: 60 TABLET | Refills: 1 | Status: ON HOLD | OUTPATIENT
Start: 2018-12-04 | End: 2019-01-03

## 2018-12-04 RX ORDER — RIZATRIPTAN BENZOATE 5 MG/1
TABLET, ORALLY DISINTEGRATING ORAL
Qty: 9 TABLET | Refills: 1 | Status: SHIPPED | OUTPATIENT
Start: 2018-12-04
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
## 2018-12-21 ENCOUNTER — TELEPHONE (OUTPATIENT)
Dept: GASTROENTEROLOGY | Facility: CLINIC | Age: 53
End: 2018-12-21

## 2019-01-02 ENCOUNTER — ANESTHESIA EVENT (OUTPATIENT)
Dept: GASTROENTEROLOGY | Facility: HOSPITAL | Age: 54
End: 2019-01-02
Payer: COMMERCIAL

## 2019-01-02 RX ORDER — SODIUM CHLORIDE, SODIUM LACTATE, POTASSIUM CHLORIDE, CALCIUM CHLORIDE 600; 310; 30; 20 MG/100ML; MG/100ML; MG/100ML; MG/100ML
125 INJECTION, SOLUTION INTRAVENOUS CONTINUOUS
Status: CANCELLED | OUTPATIENT
Start: 2019-01-02

## 2019-01-03 ENCOUNTER — ANESTHESIA (OUTPATIENT)
Dept: GASTROENTEROLOGY | Facility: HOSPITAL | Age: 54
End: 2019-01-03
Payer: COMMERCIAL

## 2019-01-03 ENCOUNTER — HOSPITAL ENCOUNTER (OUTPATIENT)
Facility: HOSPITAL | Age: 54
Setting detail: OUTPATIENT SURGERY
Discharge: HOME | End: 2019-01-03
Attending: INTERNAL MEDICINE | Admitting: INTERNAL MEDICINE
Payer: COMMERCIAL

## 2019-01-03 VITALS
BODY MASS INDEX: 24.25 KG/M2 | RESPIRATION RATE: 18 BRPM | TEMPERATURE: 96 F | HEART RATE: 86 BPM | HEIGHT: 75 IN | WEIGHT: 195 LBS | SYSTOLIC BLOOD PRESSURE: 125 MMHG | OXYGEN SATURATION: 95 % | DIASTOLIC BLOOD PRESSURE: 92 MMHG

## 2019-01-03 DIAGNOSIS — K22.70 BARRETT'S ESOPHAGUS WITHOUT DYSPLASIA: ICD-10-CM

## 2019-01-03 DIAGNOSIS — K21.9 GASTROESOPHAGEAL REFLUX DISEASE WITHOUT ESOPHAGITIS: ICD-10-CM

## 2019-01-03 DIAGNOSIS — D12.6 TUBULAR ADENOMA OF COLON: ICD-10-CM

## 2019-01-03 PROCEDURE — 43239 EGD BIOPSY SINGLE/MULTIPLE: CPT | Performed by: INTERNAL MEDICINE

## 2019-01-03 PROCEDURE — 88305 TISSUE EXAM BY PATHOLOGIST: CPT | Performed by: PATHOLOGY

## 2019-01-03 PROCEDURE — 45385 COLONOSCOPY W/LESION REMOVAL: CPT | Performed by: INTERNAL MEDICINE

## 2019-01-03 RX ORDER — SODIUM CHLORIDE 9 MG/ML
50 INJECTION, SOLUTION INTRAVENOUS CONTINUOUS
Status: DISCONTINUED | OUTPATIENT
Start: 2019-01-03 | End: 2019-01-03 | Stop reason: HOSPADM

## 2019-01-03 RX ORDER — PROPOFOL 10 MG/ML
INJECTION, EMULSION INTRAVENOUS CONTINUOUS PRN
Status: DISCONTINUED | OUTPATIENT
Start: 2019-01-03 | End: 2019-01-03 | Stop reason: SURG

## 2019-01-03 RX ORDER — GLYCOPYRROLATE 0.2 MG/ML
INJECTION INTRAMUSCULAR; INTRAVENOUS AS NEEDED
Status: DISCONTINUED | OUTPATIENT
Start: 2019-01-03 | End: 2019-01-03 | Stop reason: SURG

## 2019-01-03 RX ORDER — ONDANSETRON 2 MG/ML
INJECTION INTRAMUSCULAR; INTRAVENOUS AS NEEDED
Status: DISCONTINUED | OUTPATIENT
Start: 2019-01-03 | End: 2019-01-03 | Stop reason: SURG

## 2019-01-03 RX ORDER — PROPOFOL 10 MG/ML
INJECTION, EMULSION INTRAVENOUS AS NEEDED
Status: DISCONTINUED | OUTPATIENT
Start: 2019-01-03 | End: 2019-01-03 | Stop reason: SURG

## 2019-01-03 RX ORDER — METOCLOPRAMIDE HYDROCHLORIDE 5 MG/ML
INJECTION INTRAMUSCULAR; INTRAVENOUS AS NEEDED
Status: DISCONTINUED | OUTPATIENT
Start: 2019-01-03 | End: 2019-01-03 | Stop reason: SURG

## 2019-01-03 RX ADMIN — LIDOCAINE HYDROCHLORIDE 100 MG: 20 INJECTION, SOLUTION INTRAVENOUS at 11:58

## 2019-01-03 RX ADMIN — GLYCOPYRROLATE 0.1 MG: 0.2 INJECTION, SOLUTION INTRAMUSCULAR; INTRAVENOUS at 11:58

## 2019-01-03 RX ADMIN — SODIUM CHLORIDE: 0.9 INJECTION, SOLUTION INTRAVENOUS at 10:36

## 2019-01-03 RX ADMIN — ONDANSETRON 4 MG: 2 INJECTION INTRAMUSCULAR; INTRAVENOUS at 11:45

## 2019-01-03 RX ADMIN — PROPOFOL 120 MG: 10 INJECTION, EMULSION INTRAVENOUS at 12:04

## 2019-01-03 RX ADMIN — PROPOFOL 130 MCG/KG/MIN: 10 INJECTION, EMULSION INTRAVENOUS at 12:06

## 2019-01-03 RX ADMIN — METOCLOPRAMIDE 10 MG: 5 INJECTION, SOLUTION INTRAMUSCULAR; INTRAVENOUS at 11:47

## 2019-01-03 RX ADMIN — SODIUM CHLORIDE 50 ML/HR: 0.9 INJECTION, SOLUTION INTRAVENOUS at 10:38

## 2019-01-03 RX ADMIN — SODIUM CHLORIDE: 0.9 INJECTION, SOLUTION INTRAVENOUS at 12:19

## 2019-01-03 NOTE — ANESTHESIA PREPROCEDURE EVALUATION
Review of Systems/Medical History  Patient summary reviewed  Chart reviewed  No history of anesthetic complications     Cardiovascular  EKG reviewed, Hyperlipidemia, Hypertension ,    Pulmonary  Smoker ex-smoker  Cumulative Pack Years: 61, COPD ,        GI/Hepatic    GERD , Hepatitis C,  Hiatal hernia, Bowel prep            Endo/Other     GYN       Hematology   Musculoskeletal  Back pain ,   Arthritis     Neurology    Headaches,    Psychology     Chronic opioid dependence Chronic pain,   Comment: On medical marijuana and decreased opiate use          Physical Exam    Airway    Mallampati score: I  TM Distance: >3 FB  Neck ROM: full     Dental       Cardiovascular      Pulmonary      Other Findings        Anesthesia Plan  ASA Score- 2     Anesthesia Type- IV sedation with anesthesia with ASA Monitors  Additional Monitors:   Airway Plan:         Plan Factors-  Patient did not smoke on day of surgery  Induction-     Postoperative Plan-     Informed Consent- Anesthetic plan and risks discussed with patient  I personally reviewed this patient with the CRNA  Discussed and agreed on the Anesthesia Plan with the CRNA  Naomi Wells

## 2019-01-03 NOTE — ANESTHESIA POSTPROCEDURE EVALUATION
Patient presents to clinic for blood pressure check no concerns at this time  Routed to patient's pcp   Post-Op Assessment Note      CV Status:  Stable    Post-procedure mental status: sleeping      Hydration Status:  Euvolemic    PONV Controlled:  Controlled    Airway Patency:  Patent    Post Op Vitals Reviewed: Yes          Staff: Anesthesiologist, CRNA       Comments: skinp ink warm and dry tolerated procedure without incident          BP (!) 80/50 (anesthesia aware) (01/03/19 1245)    Temp     Pulse 84 (01/03/19 1245)   Resp 18 (01/03/19 1245)    SpO2 98 % (01/03/19 1245)
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
## 2019-01-03 NOTE — DISCHARGE INSTR - AVS FIRST PAGE
OPERATIVE REPORT  PATIENT NAME: Delfin Desir    :  1965  MRN: [id number]  Pt Location: [facility] ROOM 04    SURGERY DATE: 1/3/2019    Surgeon(s) and Role:     Uziel Valenzuela MD - Primary    Preop Diagnosis:  Gastroesophageal reflux disease without esophagitis [K21 9]  Mustafa's esophagus without dysplasia [K22 70]  Tubular adenoma of colon [D12 6]    Post-Op Diagnosis Codes:     * Gastroesophageal reflux disease without esophagitis [K21 9]     * Mustafa's esophagus without dysplasia [K22 70]     * Tubular adenoma of colon [D12 6]    Procedure(s) (LRB):  EGD AND COLONOSCOPY (N/A)    Specimen(s):  ID Type Source Tests Collected by Time Destination   1 : GE junction bx at 41 cm-cold bx Tissue Esophagogastric junction TISSUE EXAM Sugar Collins MD 1/3/2019 12:10 PM    2 : GE junction bx at 40 cm-cold bx Tissue Esophagogastric junction TISSUE EXAM Sugar Collins MD 1/3/2019 12:11 PM        Estimated Blood Loss:   Minimal    ESOPHAGOGASTRODUODENOSCOPY    PROCEDURE: EGD/ Biopsy    INDICATIONS: Barretts Esophagus, Surveillance    POST-OP DIAGNOSIS: See the impression below    SEDATION: Monitored anesthesia care, check anesthesia records    PHYSICAL EXAM:    Vitals:    19 1027   BP: 141/95   Pulse: 76   Resp: 18   Temp: (!) 96 °F (35 6 °C)   SpO2: 98%    Body mass index is 24 37 kg/m²  General: NAD  Heart: S1 & S2 normal, RRR  Lungs: CTA, No rales or rhonchi  Abdomen: Soft, nontender, nondistended, good bowel sounds    CONSENT:  Informed consent was obtained for the procedure, including sedation after explaining the risks and benefits of the procedure  Risks including but not limited to bleeding, perforation, infection, aspiration were discussed in detail  Also explained about less than 100% sensitivity with the exam and other alternatives  PREPARATION:   EKG tracing, pulse oximetry, blood pressure were monitored throughout the procedure    Patient was identified by myself both verbally and by visual inspection of ID band  DESCRIPTION:   Patient was placed in the left lateral decubitus position and was sedated with the above medication  The gastroscope was introduced in to the oropharynx and the esophagus was intubated under direct visualization  Scope was passed down the esophagus up to 2nd part of the duodenum  A careful inspection was made as the gastroscope was withdrawn, including a retroflexed view of the stomach; findings and interventions are described below  FINDINGS:    #1  Esophagus and GEJ- there was evidence of Mustafa's esophagus starting at 40 cm and extending as the tongue to 41 cm  Biopsies were done  There was a 3 cm hiatal hernia  #2  Stomach- the stomach was normal   Hiatal hernia was seen on retroflexion  #3  Duodenum- the duodenal bulb and 2nd portion were normal          IMPRESSIONS:      1  1cm tongue of Mustafa's esophaguus  Biopsies done  2  3cm hiatal hernia  RECOMMENDATIONS:     1  Follow up biopsy results  2  If no dysplasia is seen then follow up with repeat EGD in 2 - 3 years  3  Continue omeprazole  COMPLICATIONS:  None; patient tolerated the procedure well            DISPOSITION: PACU           CONDITION: Stable      OPERATIVE REPORT  PATIENT NAME: Torrey Chaudhary    :  1965  MRN: [id number]  Pt Location:  GI ROOM 04    SURGERY DATE: 1/3/2019    Surgeon(s) and Role:     Hugo Ramos MD - Primary    Preop Diagnosis:  Gastroesophageal reflux disease without esophagitis [K21 9]  Mustafa's esophagus without dysplasia [K22 70]  Tubular adenoma of colon [D12 6]    Post-Op Diagnosis Codes:     * Gastroesophageal reflux disease without esophagitis [K21 9]     * Mustafa's esophagus without dysplasia [K22 70]     * Tubular adenoma of colon [D12 6]    Procedure(s) (LRB):  EGD AND COLONOSCOPY (N/A)    Specimen(s):  ID Type Source Tests Collected by Time Destination   1 : GE junction bx at 41 cm-cold bx Tissue Esophagogastric junction TISSUE EXAM Lawrence Rivas Dylan Hamm MD 1/3/2019 12:10 PM    2 : GE junction bx at 40 cm-cold bx Tissue Esophagogastric junction TISSUE Kim Tucker MD 1/3/2019 12:11 PM    3 : Ascending colon polyp-cold snare Tissue Polyp, Colorectal TISSUE EXAM Rafal Shore MD 1/3/2019 12:23 PM    4 : Cecum polyp-cold snare Tissue Polyp, Colorectal TISSUE EXAM Rafal Shore MD 1/3/2019 12:25 PM    5 : transverse polyp-cold snare Tissue Polyp, Colorectal TISSUE EXAM Rafal Shore MD 1/3/2019 12:29 PM    6 : rectal polyp-cold snare Tissue Polyp, Colorectal TISSUE EXAM Rafal Shore MD 1/3/2019 12:36 PM        Estimated Blood Loss:   Minimal    COLONOSCOPY    PROCEDURE: Colonoscopy/ Polypectomy (Cold Snare)    INDICATIONS: History of Colon Polyps    POST-OP DIAGNOSIS: See the impression below    SEDATION: Monitored anesthesia care, check anesthesia records    PHYSICAL EXAM:    BP (!) 80/50 Comment: anesthesia aware  Pulse 84   Temp (!) 96 °F (35 6 °C) (Tympanic)   Resp 18   Ht 6' 3" (1 905 m)   Wt 88 5 kg (195 lb)   SpO2 98%   BMI 24 37 kg/m²    Body mass index is 24 37 kg/m²  General: NAD  Heart: S1 & S2 normal, RRR  Lungs: CTA, No rales or rhonchi  Abdomen: Soft, nontender, nondistended, good bowel sounds    CONSENT:  Informed consent was obtained for the procedure, including sedation after explaining the risks and benefits of the procedure  Risks including but not limited to bleeding, perforation, infection, aspiration were discussed in detail  Also explained about less than 100%$ sensitivity with the exam and other alternatives  PREPARATION:   EKG tracing, pulse oximetry, blood pressure were monitored throughout the procedure  Patient was identified by myself both verbally and by visual inspection of ID band  DESCRIPTION:   Patient was placed in the left lateral decubitus position and was sedated with the above medication  Digital rectal examination was performed   The colonoscope was introduced in to the anal canal and advanced up to cecum, which was identified by the appendiceal orifice and IC valve  A careful inspection was made as the colonoscope was withdrawn, including a retroflexed view of the rectum; findings and interventions are described below  Appropriate photodocumentation was obtained  The quality of the colonic preparation was good  FINDINGS:    Multiple polyps were seen  One small polyp was seen in the cecum which was removed by cold snare polypectomy  One medium-sized polyp was seen in the ascending colon which was removed by cold snare polypectomy  One small polyp was seen in the transverse colon was removed by cold snare polypectomy  Multiple small to medium polyps were seen in the rectum and rectosigmoid junction  These were likely hyperplastic polyps and 2 were removed since they looked different and possible adenomas  Internal hemorrhoids were seen  IMPRESSIONS:      1  Multiple polyps seen in the cecum, ascending, transverse removed by cold snare polypectomy  2  Multiple rectal polyps were seen which were likely hyperplastic polyps  Two were removed  3  Internal hemorrhoids  RECOMMENDATIONS:    1  Follow up with the results of the biopsy Dr Rui Rojas in 2 weeks  2  Repeat colonoscopy in 3 years  COMPLICATIONS:  None; patient tolerated the procedure well      DISPOSITION: PACU           CONDITION: Stable

## 2019-01-03 NOTE — OP NOTE
OPERATIVE REPORT  PATIENT NAME: Kip Jensen    :  1965  MRN: [id number]  Pt Location: [facility] RO[facility] 04    SURGERY DATE: 1/3/2019    Surgeon(s) and Role:     William Ramírez MD - Primary    Preop Diagnosis:  Gastroesophageal reflux disease without esophagitis [K21 9]  Mustaaf's esophagus without dysplasia [K22 70]  Tubular adenoma of colon [D12 6]    Post-Op Diagnosis Codes:     * Gastroesophageal reflux disease without esophagitis [K21 9]     * Mustafa's esophagus without dysplasia [K22 70]     * Tubular adenoma of colon [D12 6]    Procedure(s) (LRB):  EGD AND COLONOSCOPY (N/A)    Specimen(s):  ID Type Source Tests Collected by Time Destination   1 : GE junction bx at 41 cm-cold bx Tissue Esophagogastric junction TISSUE EXAM Talia Mares MD 1/3/2019 12:10 PM    2 : GE junction bx at 40 cm-cold bx Tissue Esophagogastric junction TISSUE EXAM Talia Mares MD 1/3/2019 12:11 PM        Estimated Blood Loss:   Minimal    ESOPHAGOGASTRODUODENOSCOPY    PROCEDURE: EGD/ Biopsy    INDICATIONS: Barretts Esophagus, Surveillance    POST-OP DIAGNOSIS: See the impression below    SEDATION: Monitored anesthesia care, check anesthesia records    PHYSICAL EXAM:    Vitals:    19 1027   BP: 141/95   Pulse: 76   Resp: 18   Temp: (!) 96 °F (35 6 °C)   SpO2: 98%    Body mass index is 24 37 kg/m²  General: NAD  Heart: S1 & S2 normal, RRR  Lungs: CTA, No rales or rhonchi  Abdomen: Soft, nontender, nondistended, good bowel sounds    CONSENT:  Informed consent was obtained for the procedure, including sedation after explaining the risks and benefits of the procedure  Risks including but not limited to bleeding, perforation, infection, aspiration were discussed in detail  Also explained about less than 100% sensitivity with the exam and other alternatives  PREPARATION:   EKG tracing, pulse oximetry, blood pressure were monitored throughout the procedure    Patient was identified by myself both verbally and by visual inspection of ID band  DESCRIPTION:   Patient was placed in the left lateral decubitus position and was sedated with the above medication  The gastroscope was introduced in to the oropharynx and the esophagus was intubated under direct visualization  Scope was passed down the esophagus up to 2nd part of the duodenum  A careful inspection was made as the gastroscope was withdrawn, including a retroflexed view of the stomach; findings and interventions are described below  FINDINGS:    #1  Esophagus and GEJ- there was evidence of Mustafa's esophagus starting at 40 cm and extending as the tongue to 41 cm  Biopsies were done  There was a 3 cm hiatal hernia  #2  Stomach- the stomach was normal   Hiatal hernia was seen on retroflexion  #3  Duodenum- the duodenal bulb and 2nd portion were normal          IMPRESSIONS:      1  1cm tongue of Mustafa's esophaguus  Biopsies done  2  3cm hiatal hernia  RECOMMENDATIONS:     1  Follow up biopsy results  2  If no dysplasia is seen then follow up with repeat EGD in 2 - 3 years  3  Continue omeprazole  COMPLICATIONS:  None; patient tolerated the procedure well            DISPOSITION: PACU           CONDITION: Stable

## 2019-01-03 NOTE — OP NOTE
OPERATIVE REPORT  PATIENT NAME: Charly Barrow    :  1965  MRN: [id number]  Pt Location: [facility]OM 04    SURGERY DATE: 1/3/2019    Surgeon(s) and Role:     Cynthia Cevallos MD - Primary    Preop Diagnosis:  Gastroesophageal reflux disease without esophagitis [K21 9]  Mustafa's esophagus without dysplasia [K22 70]  Tubular adenoma of colon [D12 6]    Post-Op Diagnosis Codes:     * Gastroesophageal reflux disease without esophagitis [K21 9]     * Mustafa's esophagus without dysplasia [K22 70]     * Tubular adenoma of colon [D12 6]    Procedure(s) (LRB):  EGD AND COLONOSCOPY (N/A)    Specimen(s):  ID Type Source Tests Collected by Time Destination   1 : GE junction bx at 41 cm-cold bx Tissue Esophagogastric junction TISSUE EXAM Daniel Whitley MD 1/3/2019 12:10 PM    2 : GE junction bx at 40 cm-cold bx Tissue Esophagogastric junction TISSUE EXAM Daniel Whitley MD 1/3/2019 12:11 PM    3 : Ascending colon polyp-cold snare Tissue Polyp, Colorectal TISSUE EXAM Daniel Whitley MD 1/3/2019 12:23 PM    4 : Cecum polyp-cold snare Tissue Polyp, Colorectal TISSUE EXAM Daniel Whitley MD 1/3/2019 12:25 PM    5 : transverse polyp-cold snare Tissue Polyp, Colorectal TISSUE EXAM Daniel Whitley MD 1/3/2019 12:29 PM    6 : rectal polyp-cold snare Tissue Polyp, Colorectal TISSUE EXAM Daniel Whitley MD 1/3/2019 12:36 PM        Estimated Blood Loss:   Minimal    COLONOSCOPY    PROCEDURE: Colonoscopy/ Polypectomy (Cold Snare)    INDICATIONS: History of Colon Polyps    POST-OP DIAGNOSIS: See the impression below    SEDATION: Monitored anesthesia care, check anesthesia records    PHYSICAL EXAM:    BP (!) 80/50 Comment: anesthesia aware  Pulse 84   Temp (!) 96 °F (35 6 °C) (Tympanic)   Resp 18   Ht 6' 3" (1 905 m)   Wt 88 5 kg (195 lb)   SpO2 98%   BMI 24 37 kg/m²   Body mass index is 24 37 kg/m²      General: NAD  Heart: S1 & S2 normal, RRR  Lungs: CTA, No rales or rhonchi  Abdomen: Soft, nontender, nondistended, good bowel sounds    CONSENT:  Informed consent was obtained for the procedure, including sedation after explaining the risks and benefits of the procedure  Risks including but not limited to bleeding, perforation, infection, aspiration were discussed in detail  Also explained about less than 100%$ sensitivity with the exam and other alternatives  PREPARATION:   EKG tracing, pulse oximetry, blood pressure were monitored throughout the procedure  Patient was identified by myself both verbally and by visual inspection of ID band  DESCRIPTION:   Patient was placed in the left lateral decubitus position and was sedated with the above medication  Digital rectal examination was performed  The colonoscope was introduced in to the anal canal and advanced up to cecum, which was identified by the appendiceal orifice and IC valve  A careful inspection was made as the colonoscope was withdrawn, including a retroflexed view of the rectum; findings and interventions are described below  Appropriate photodocumentation was obtained  The quality of the colonic preparation was good  FINDINGS:    Multiple polyps were seen  One small polyp was seen in the cecum which was removed by cold snare polypectomy  One medium-sized polyp was seen in the ascending colon which was removed by cold snare polypectomy  One small polyp was seen in the transverse colon was removed by cold snare polypectomy  Multiple small to medium polyps were seen in the rectum and rectosigmoid junction  These were likely hyperplastic polyps and 2 were removed since they looked different and possible adenomas  Internal hemorrhoids were seen  IMPRESSIONS:      1  Multiple polyps seen in the cecum, ascending, transverse removed by cold snare polypectomy  2  Multiple rectal polyps were seen which were likely hyperplastic polyps  Two were removed  3  Internal hemorrhoids  RECOMMENDATIONS:    1   Follow up with the results of the biopsy Dr Jenna Bedolla in 2 weeks  2  Repeat colonoscopy in 3 years  COMPLICATIONS:  None; patient tolerated the procedure well      DISPOSITION: PACU           CONDITION: Stable

## 2019-01-08 ENCOUNTER — TELEPHONE (OUTPATIENT)
Dept: GASTROENTEROLOGY | Facility: CLINIC | Age: 54
End: 2019-01-08

## 2019-01-08 NOTE — TELEPHONE ENCOUNTER
Dr Kat Wong pt    Pt called in stating he was advised to schedule an egd/colon f/u in exactly 2 weeks  No availability in 2 weeks, please advise if the pt should be overbooked   [phone]

## 2019-01-15 ENCOUNTER — OFFICE VISIT (OUTPATIENT)
Dept: GASTROENTEROLOGY | Facility: CLINIC | Age: 54
End: 2019-01-15
Payer: COMMERCIAL

## 2019-01-15 VITALS
DIASTOLIC BLOOD PRESSURE: 76 MMHG | WEIGHT: 198 LBS | TEMPERATURE: 95.7 F | HEART RATE: 55 BPM | BODY MASS INDEX: 24.62 KG/M2 | SYSTOLIC BLOOD PRESSURE: 130 MMHG | HEIGHT: 75 IN

## 2019-01-15 DIAGNOSIS — D12.6 ADENOMA OF COLON: ICD-10-CM

## 2019-01-15 DIAGNOSIS — K21.9 GASTROESOPHAGEAL REFLUX DISEASE WITHOUT ESOPHAGITIS: Chronic | ICD-10-CM

## 2019-01-15 DIAGNOSIS — K22.70 BARRETT'S ESOPHAGUS WITHOUT DYSPLASIA: Primary | ICD-10-CM

## 2019-01-15 DIAGNOSIS — K44.9 HIATAL HERNIA: ICD-10-CM

## 2019-01-15 DIAGNOSIS — R76.8 HEPATITIS C ANTIBODY TEST POSITIVE: ICD-10-CM

## 2019-01-15 PROCEDURE — 99214 OFFICE O/P EST MOD 30 MIN: CPT | Performed by: PHYSICIAN ASSISTANT

## 2019-01-15 RX ORDER — MORPHINE SULFATE AND NALTREXONE HYDROCHLORIDE 20; .8 MG/1; MG/1
1 CAPSULE, EXTENDED RELEASE ORAL EVERY OTHER DAY
Refills: 0 | COMMUNITY
Start: 2019-01-09 | End: 2019-02-22 | Stop reason: ALTCHOICE

## 2019-01-15 RX ORDER — OMEPRAZOLE 40 MG/1
CAPSULE, DELAYED RELEASE ORAL
Refills: 1 | COMMUNITY
Start: 2018-12-21 | End: 2019-02-22 | Stop reason: ALTCHOICE

## 2019-01-15 RX ORDER — OMEPRAZOLE 40 MG/1
CAPSULE, DELAYED RELEASE ORAL
COMMUNITY
Start: 2018-12-21 | End: 2019-01-15 | Stop reason: SDUPTHER

## 2019-01-15 RX ORDER — SODIUM, POTASSIUM,MAG SULFATES 17.5-3.13G
SOLUTION, RECONSTITUTED, ORAL ORAL
Refills: 0 | COMMUNITY
Start: 2018-11-05 | End: 2019-02-22 | Stop reason: ALTCHOICE
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
## 2019-01-22 ENCOUNTER — APPOINTMENT (OUTPATIENT)
Dept: LAB | Facility: HOSPITAL | Age: 54
End: 2019-01-22
Attending: INTERNAL MEDICINE
Payer: COMMERCIAL

## 2019-01-22 DIAGNOSIS — R76.8 HEPATITIS C ANTIBODY TEST POSITIVE: ICD-10-CM

## 2019-01-22 LAB
ALBUMIN SERPL BCP-MCNC: 3.8 G/DL (ref 3.5–5)
ALP SERPL-CCNC: 120 U/L (ref 46–116)
ALT SERPL W P-5'-P-CCNC: 30 U/L (ref 12–78)
ANION GAP SERPL CALCULATED.3IONS-SCNC: 6 MMOL/L (ref 4–13)
AST SERPL W P-5'-P-CCNC: 17 U/L (ref 5–45)
BASOPHILS # BLD AUTO: 0.04 THOUSANDS/ΜL (ref 0–0.1)
BASOPHILS NFR BLD AUTO: 1 % (ref 0–1)
BILIRUB SERPL-MCNC: 0.39 MG/DL (ref 0.2–1)
BUN SERPL-MCNC: 19 MG/DL (ref 5–25)
CALCIUM SERPL-MCNC: 8.5 MG/DL (ref 8.3–10.1)
CHLORIDE SERPL-SCNC: 104 MMOL/L (ref 100–108)
CO2 SERPL-SCNC: 28 MMOL/L (ref 21–32)
CREAT SERPL-MCNC: 0.83 MG/DL (ref 0.6–1.3)
EOSINOPHIL # BLD AUTO: 0.19 THOUSAND/ΜL (ref 0–0.61)
EOSINOPHIL NFR BLD AUTO: 3 % (ref 0–6)
ERYTHROCYTE [DISTWIDTH] IN BLOOD BY AUTOMATED COUNT: 12.1 % (ref 11.6–15.1)
GFR SERPL CREATININE-BSD FRML MDRD: 100 ML/MIN/1.73SQ M
GLUCOSE P FAST SERPL-MCNC: 96 MG/DL (ref 65–99)
HCT VFR BLD AUTO: 40.3 % (ref 36.5–49.3)
HGB BLD-MCNC: 13.3 G/DL (ref 12–17)
IMM GRANULOCYTES # BLD AUTO: 0.02 THOUSAND/UL (ref 0–0.2)
IMM GRANULOCYTES NFR BLD AUTO: 0 % (ref 0–2)
INR PPP: 1.07 (ref 0.86–1.17)
LYMPHOCYTES # BLD AUTO: 1.84 THOUSANDS/ΜL (ref 0.6–4.47)
LYMPHOCYTES NFR BLD AUTO: 31 % (ref 14–44)
MCH RBC QN AUTO: 31.1 PG (ref 26.8–34.3)
MCHC RBC AUTO-ENTMCNC: 33 G/DL (ref 31.4–37.4)
MCV RBC AUTO: 94 FL (ref 82–98)
MONOCYTES # BLD AUTO: 0.54 THOUSAND/ΜL (ref 0.17–1.22)
MONOCYTES NFR BLD AUTO: 9 % (ref 4–12)
NEUTROPHILS # BLD AUTO: 3.27 THOUSANDS/ΜL (ref 1.85–7.62)
NEUTS SEG NFR BLD AUTO: 56 % (ref 43–75)
NRBC BLD AUTO-RTO: 0 /100 WBCS
PLATELET # BLD AUTO: 261 THOUSANDS/UL (ref 149–390)
PMV BLD AUTO: 8.8 FL (ref 8.9–12.7)
POTASSIUM SERPL-SCNC: 3.9 MMOL/L (ref 3.5–5.3)
PROT SERPL-MCNC: 7 G/DL (ref 6.4–8.2)
PROTHROMBIN TIME: 14 SECONDS (ref 11.8–14.2)
RBC # BLD AUTO: 4.27 MILLION/UL (ref 3.88–5.62)
SODIUM SERPL-SCNC: 138 MMOL/L (ref 136–145)
WBC # BLD AUTO: 5.9 THOUSAND/UL (ref 4.31–10.16)

## 2019-01-22 PROCEDURE — 85025 COMPLETE CBC W/AUTO DIFF WBC: CPT

## 2019-01-22 PROCEDURE — 80053 COMPREHEN METABOLIC PANEL: CPT

## 2019-01-22 PROCEDURE — 86708 HEPATITIS A ANTIBODY: CPT

## 2019-01-22 PROCEDURE — 36415 COLL VENOUS BLD VENIPUNCTURE: CPT

## 2019-01-22 PROCEDURE — 85610 PROTHROMBIN TIME: CPT

## 2019-01-22 PROCEDURE — 86705 HEP B CORE ANTIBODY IGM: CPT

## 2019-01-22 PROCEDURE — 86704 HEP B CORE ANTIBODY TOTAL: CPT

## 2019-01-22 PROCEDURE — 87340 HEPATITIS B SURFACE AG IA: CPT

## 2019-01-22 PROCEDURE — 86706 HEP B SURFACE ANTIBODY: CPT

## 2019-01-23 LAB
HAV AB SER QL IA: NORMAL
HBV CORE AB SER QL: NORMAL
HBV CORE IGM SER QL: NORMAL
HBV SURFACE AB SER-ACNC: <3.1 MIU/ML
HBV SURFACE AG SER QL: NORMAL

## 2019-01-31 ENCOUNTER — TELEPHONE (OUTPATIENT)
Dept: GASTROENTEROLOGY | Facility: AMBULARY SURGERY CENTER | Age: 54
End: 2019-01-31

## 2019-01-31 NOTE — TELEPHONE ENCOUNTER
DR BANSAL'S PT    Pt called requesting more options for the hep vaccine because his pcp doesn't offer those anymore

## 2019-01-31 NOTE — TELEPHONE ENCOUNTER
He should find out where his insurance wants him to go  Pharmacies often offer this but I do not want to tell him to go somewhere that will not be covered

## 2019-02-04 NOTE — TELEPHONE ENCOUNTER
Patient called again for vaccination advise relayed message to patient of checking insurance and pharmacies

## 2019-02-22 ENCOUNTER — OFFICE VISIT (OUTPATIENT)
Dept: FAMILY MEDICINE CLINIC | Facility: CLINIC | Age: 54
End: 2019-02-22
Payer: COMMERCIAL

## 2019-02-22 VITALS
WEIGHT: 195 LBS | DIASTOLIC BLOOD PRESSURE: 84 MMHG | HEART RATE: 66 BPM | HEIGHT: 75 IN | TEMPERATURE: 97.3 F | SYSTOLIC BLOOD PRESSURE: 120 MMHG | OXYGEN SATURATION: 97 % | BODY MASS INDEX: 24.25 KG/M2

## 2019-02-22 DIAGNOSIS — F34.1 DYSTHYMIA: ICD-10-CM

## 2019-02-22 DIAGNOSIS — G47.00 INSOMNIA, UNSPECIFIED TYPE: ICD-10-CM

## 2019-02-22 DIAGNOSIS — F11.20 UNCOMPLICATED OPIOID DEPENDENCE (HCC): ICD-10-CM

## 2019-02-22 DIAGNOSIS — L30.9 ECZEMA, UNSPECIFIED TYPE: Primary | ICD-10-CM

## 2019-02-22 PROCEDURE — 3008F BODY MASS INDEX DOCD: CPT | Performed by: FAMILY MEDICINE

## 2019-02-22 PROCEDURE — 99214 OFFICE O/P EST MOD 30 MIN: CPT | Performed by: FAMILY MEDICINE

## 2019-02-22 PROCEDURE — 1036F TOBACCO NON-USER: CPT | Performed by: FAMILY MEDICINE

## 2019-02-22 RX ORDER — ZOLPIDEM TARTRATE 12.5 MG/1
12.5 TABLET, FILM COATED, EXTENDED RELEASE ORAL
Qty: 30 TABLET | Refills: 2 | Status: SHIPPED | OUTPATIENT
Start: 2019-02-22 | End: 2019-05-22 | Stop reason: SDUPTHER

## 2019-02-22 RX ORDER — TIZANIDINE 4 MG/1
4 TABLET ORAL 3 TIMES DAILY
COMMUNITY
End: 2020-03-30 | Stop reason: ALTCHOICE

## 2019-02-22 RX ORDER — VIT E ACET/GLY/DIMETH/WATER
LOTION (ML) TOPICAL AS NEEDED
Qty: 236 ML | Refills: 0
Start: 2019-02-22 | End: 2019-05-29 | Stop reason: ALTCHOICE

## 2019-02-22 NOTE — ASSESSMENT & PLAN NOTE
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
## 2019-02-22 NOTE — ASSESSMENT & PLAN NOTE
He continues to follow with Pain Management in is actively attempting weaning of his opioid narcotics  He does use medical marijuana

## 2019-02-22 NOTE — ASSESSMENT & PLAN NOTE
Patient is suffering from a mood disorder presently  May represent some degree of seasonal affective disorder though I do believe he has an underlying issue with depression  Currently he declines any treatment  He is not suicidal   This has been exacerbated by issues regarding 1 of his children who was been counseled by psychiatrist   He has social dysfunction with his ex-wife in regards to dealing with issues related to the daughter

## 2019-02-22 NOTE — ASSESSMENT & PLAN NOTE
Going to have him cut back on his lisinopril to 5 mg, 1/2 of a 10 mg tablet daily  He is asked to call if his blood pressure readings over the next week or 10 days

## 2019-02-22 NOTE — ASSESSMENT & PLAN NOTE
We are going to have him try Ambien CR again  This has been more effective for him the past and Ambien immediate release  He is going to call with report of effectiveness in the next couple weeks

## 2019-02-23 DIAGNOSIS — I10 ESSENTIAL HYPERTENSION: ICD-10-CM

## 2019-02-26 RX ORDER — LISINOPRIL 10 MG/1
TABLET ORAL
Qty: 90 TABLET | Refills: 1 | Status: SHIPPED | OUTPATIENT
Start: 2019-02-26 | End: 2019-03-05 | Stop reason: DRUGHIGH

## 2019-03-05 DIAGNOSIS — I10 BENIGN ESSENTIAL HYPERTENSION: Primary | ICD-10-CM

## 2019-03-05 RX ORDER — LISINOPRIL 5 MG/1
5 TABLET ORAL DAILY
Qty: 30 TABLET | Refills: 5 | Status: SHIPPED | OUTPATIENT
Start: 2019-03-05 | End: 2019-08-19 | Stop reason: SDUPTHER

## 2019-05-22 DIAGNOSIS — G47.00 INSOMNIA, UNSPECIFIED TYPE: ICD-10-CM

## 2019-05-22 RX ORDER — ZOLPIDEM TARTRATE 12.5 MG/1
12.5 TABLET, FILM COATED, EXTENDED RELEASE ORAL
Qty: 30 TABLET | Refills: 2 | Status: SHIPPED | OUTPATIENT
Start: 2019-05-22 | End: 2019-08-19 | Stop reason: ALTCHOICE

## 2019-05-29 ENCOUNTER — OFFICE VISIT (OUTPATIENT)
Dept: FAMILY MEDICINE CLINIC | Facility: CLINIC | Age: 54
End: 2019-05-29
Payer: COMMERCIAL

## 2019-05-29 VITALS
HEIGHT: 75 IN | BODY MASS INDEX: 24.74 KG/M2 | HEART RATE: 72 BPM | TEMPERATURE: 97.3 F | WEIGHT: 199 LBS | OXYGEN SATURATION: 97 % | DIASTOLIC BLOOD PRESSURE: 86 MMHG | SYSTOLIC BLOOD PRESSURE: 140 MMHG

## 2019-05-29 DIAGNOSIS — H65.93 BILATERAL OTITIS MEDIA WITH EFFUSION: Primary | ICD-10-CM

## 2019-05-29 PROCEDURE — 99213 OFFICE O/P EST LOW 20 MIN: CPT | Performed by: FAMILY MEDICINE

## 2019-05-29 RX ORDER — FLUTICASONE PROPIONATE 50 MCG
SPRAY, SUSPENSION (ML) NASAL
Refills: 0 | COMMUNITY
Start: 2019-05-15 | End: 2019-08-19 | Stop reason: ALTCHOICE

## 2019-05-29 RX ORDER — LORATADINE 10 MG/1
10 TABLET ORAL DAILY
Qty: 30 TABLET | Refills: 2 | Status: SHIPPED | OUTPATIENT
Start: 2019-05-29 | End: 2019-08-19 | Stop reason: ALTCHOICE

## 2019-05-29 RX ORDER — PREDNISONE 10 MG/1
10 TABLET ORAL DAILY
Qty: 22 TABLET | Refills: 0 | Status: SHIPPED | OUTPATIENT
Start: 2019-05-29 | End: 2019-06-06 | Stop reason: ALTCHOICE

## 2019-06-06 ENCOUNTER — OFFICE VISIT (OUTPATIENT)
Dept: FAMILY MEDICINE CLINIC | Facility: CLINIC | Age: 54
End: 2019-06-06
Payer: COMMERCIAL

## 2019-06-06 VITALS
WEIGHT: 195 LBS | HEART RATE: 64 BPM | DIASTOLIC BLOOD PRESSURE: 82 MMHG | HEIGHT: 75 IN | TEMPERATURE: 97.1 F | OXYGEN SATURATION: 97 % | SYSTOLIC BLOOD PRESSURE: 118 MMHG | BODY MASS INDEX: 24.25 KG/M2

## 2019-06-06 DIAGNOSIS — H65.93 BILATERAL OTITIS MEDIA WITH EFFUSION: Primary | ICD-10-CM

## 2019-06-06 PROCEDURE — 1036F TOBACCO NON-USER: CPT | Performed by: FAMILY MEDICINE

## 2019-06-06 PROCEDURE — 99213 OFFICE O/P EST LOW 20 MIN: CPT | Performed by: FAMILY MEDICINE

## 2019-06-06 PROCEDURE — 3008F BODY MASS INDEX DOCD: CPT | Performed by: FAMILY MEDICINE

## 2019-06-06 RX ORDER — AMOXICILLIN 875 MG/1
875 TABLET, COATED ORAL 2 TIMES DAILY
Qty: 20 TABLET | Refills: 0 | Status: SHIPPED | OUTPATIENT
Start: 2019-06-06 | End: 2019-06-16

## 2019-06-06 RX ORDER — PREDNISONE 10 MG/1
10 TABLET ORAL DAILY
Qty: 22 TABLET | Refills: 0 | Status: SHIPPED | OUTPATIENT
Start: 2019-06-06 | End: 2019-08-19 | Stop reason: ALTCHOICE

## 2019-06-14 RX ORDER — FLUTICASONE PROPIONATE 50 MCG
SPRAY, SUSPENSION (ML) NASAL
Refills: 0 | OUTPATIENT
Start: 2019-06-14

## 2019-06-18 DIAGNOSIS — J30.2 SEASONAL ALLERGIC RHINITIS, UNSPECIFIED TRIGGER: Primary | ICD-10-CM

## 2019-06-18 RX ORDER — MONTELUKAST SODIUM 10 MG/1
10 TABLET ORAL
Qty: 30 TABLET | Refills: 1 | Status: SHIPPED | OUTPATIENT
Start: 2019-06-18 | End: 2019-07-10 | Stop reason: SDUPTHER

## 2019-07-10 DIAGNOSIS — J30.2 SEASONAL ALLERGIC RHINITIS, UNSPECIFIED TRIGGER: ICD-10-CM

## 2019-07-10 RX ORDER — MONTELUKAST SODIUM 10 MG/1
10 TABLET ORAL
Qty: 30 TABLET | Refills: 1 | Status: SHIPPED | OUTPATIENT
Start: 2019-07-10 | End: 2019-08-02 | Stop reason: SDUPTHER

## 2019-08-02 DIAGNOSIS — J30.2 SEASONAL ALLERGIC RHINITIS, UNSPECIFIED TRIGGER: ICD-10-CM

## 2019-08-02 RX ORDER — MONTELUKAST SODIUM 10 MG/1
10 TABLET ORAL
Qty: 30 TABLET | Refills: 1 | Status: SHIPPED | OUTPATIENT
Start: 2019-08-02 | End: 2019-08-25 | Stop reason: SDUPTHER

## 2019-08-19 ENCOUNTER — OFFICE VISIT (OUTPATIENT)
Dept: FAMILY MEDICINE CLINIC | Facility: CLINIC | Age: 54
End: 2019-08-19
Payer: COMMERCIAL

## 2019-08-19 VITALS
TEMPERATURE: 96 F | BODY MASS INDEX: 24.87 KG/M2 | WEIGHT: 200 LBS | OXYGEN SATURATION: 96 % | HEART RATE: 70 BPM | HEIGHT: 75 IN | DIASTOLIC BLOOD PRESSURE: 90 MMHG | SYSTOLIC BLOOD PRESSURE: 130 MMHG

## 2019-08-19 DIAGNOSIS — F34.1 DYSTHYMIA: ICD-10-CM

## 2019-08-19 DIAGNOSIS — F51.01 PRIMARY INSOMNIA: ICD-10-CM

## 2019-08-19 DIAGNOSIS — I10 BENIGN ESSENTIAL HYPERTENSION: ICD-10-CM

## 2019-08-19 DIAGNOSIS — G44.1 VASCULAR HEADACHE: ICD-10-CM

## 2019-08-19 PROBLEM — R13.10 DYSPHAGIA: Status: RESOLVED | Noted: 2017-04-10 | Resolved: 2019-08-19

## 2019-08-19 PROCEDURE — 99214 OFFICE O/P EST MOD 30 MIN: CPT | Performed by: FAMILY MEDICINE

## 2019-08-19 PROCEDURE — 3008F BODY MASS INDEX DOCD: CPT | Performed by: FAMILY MEDICINE

## 2019-08-19 PROCEDURE — 1036F TOBACCO NON-USER: CPT | Performed by: FAMILY MEDICINE

## 2019-08-19 RX ORDER — TRAZODONE HYDROCHLORIDE 50 MG/1
50 TABLET ORAL
Qty: 30 TABLET | Refills: 0 | Status: SHIPPED | OUTPATIENT
Start: 2019-08-19 | End: 2019-09-05

## 2019-08-19 RX ORDER — LISINOPRIL 10 MG/1
10 TABLET ORAL DAILY
Qty: 30 TABLET | Refills: 5
Start: 2019-08-19 | End: 2019-08-26

## 2019-08-19 NOTE — ASSESSMENT & PLAN NOTE
Blood pressure is suboptimally controlled  We are going to increase his dose of lisinopril to 10 mg daily  He is asked to come for a medical assistant blood pressure check in the next 2-3 weeks

## 2019-08-19 NOTE — PROGRESS NOTES
Assessment/Plan:  Gastroesophageal reflux disease without esophagitis  Continue with omeprazole  No dysphagia, weight loss or anorexia  Vascular headache  Currently quiescent  Has Maxalt available if necessary  Benign essential hypertension  Blood pressure is suboptimally controlled  We are going to increase his dose of lisinopril to 10 mg daily  He is asked to come for a medical assistant blood pressure check in the next 2-3 weeks  Insomnia  We are going to give him a trial of trazodone  Dysthymia  Trazodone  Diagnoses and all orders for this visit:    BMI 25 0-25 9,adult    Benign essential hypertension  -     lisinopril (ZESTRIL) 10 mg tablet; Take 1 tablet (10 mg total) by mouth daily    Primary insomnia  -     traZODone (DESYREL) 50 mg tablet; Take 1 tablet (50 mg total) by mouth daily at bedtime    Vascular headache    Dysthymia          Subjective:   Chief Complaint   Patient presents with    med check     here for checkup today  bmi f/u plan needed     I feel tired as shit  I cant sleep, the Ambien is not working  I tried some Valium but that causes me to me tired the next day  A nurse friend suggested trazodone  I had the nerves burned in my back but it did not hurt much  No migraines recently  Medical marijuana helps greatly with that  I get crying spells  Singular working for allergies  Patient ID: Rakel Perry is a 47 y o  male  HPI  The patient is a 51-year-old male who presents today for routine follow-up of multiple medical problems including chronic insomnia, vascular headaches, chronic allergic rhinitis as well as essential hypertension  He continues to follow with pain management for chronic pain syndrome  He also was seen by cannabis physician for his chronic pain  He states that he did shin montelukast seemed to help with his allergic rhinitis  He continues to have significant insomnia and states that Ambien has not been effective    He also notes that he has had sleep eating and other parasomnias that his wife has commented on  He requests switch to another agent  Friend of his is a nurse and suggested that maybe trazodone would be in his best interest as he does suffer from anxiety and depression  He states he gets crying spells but he is not suicidal   He states that he had radiofrequency ablation in his back but this did not give him any significant relief from his chronic pain  On a positive note he notes he has not needed Maxalt any time recently  The following portions of the patient's history were reviewed and updated as appropriate: allergies, current medications, past family history, past medical history, past social history, past surgical history and problem list     Review of Systems   Constitution: Positive for malaise/fatigue  Negative for chills, decreased appetite, fever, night sweats, weight gain and weight loss  Cardiovascular: Negative  Respiratory: Negative  Endocrine: Negative  Musculoskeletal: Positive for back pain, joint pain, muscle cramps and stiffness  Neurological:        Headache pattern actually improved that he has not needed Maxalt recently  Psychiatric/Behavioral: Positive for depression  Negative for substance abuse and suicidal ideas  The patient has insomnia and is nervous/anxious  Objective:    Physical Exam   Constitutional: He is oriented to person, place, and time  He appears well-developed and well-nourished  No distress  Neck: No JVD present  No thyromegaly present  Cardiovascular: Normal rate and regular rhythm  Pulmonary/Chest: Effort normal and breath sounds normal    Musculoskeletal: He exhibits no edema  Lymphadenopathy:     He has no cervical adenopathy  Neurological: He is oriented to person, place, and time  Continues with rhythmic myoclonic movement of his left lower extremity   Skin: No rash noted  Psychiatric:   Appears anxious   Nursing note and vitals reviewed  BMI Counseling: Body mass index is 25 kg/m²  Discussed the patient's BMI with him  The BMI is above average  BMI counseling and education was provided to the patient  Nutrition recommendations include reducing portion sizes, 3-5 servings of fruits/vegetables daily, consuming healthier snacks and moderation in carbohydrate intake  Exercise recommendations include exercising 3-5 times per week

## 2019-08-25 DIAGNOSIS — I10 BENIGN ESSENTIAL HYPERTENSION: ICD-10-CM

## 2019-08-25 DIAGNOSIS — J30.2 SEASONAL ALLERGIC RHINITIS, UNSPECIFIED TRIGGER: ICD-10-CM

## 2019-08-26 DIAGNOSIS — I10 BENIGN ESSENTIAL HYPERTENSION: ICD-10-CM

## 2019-08-26 RX ORDER — LISINOPRIL 5 MG/1
TABLET ORAL
Qty: 90 TABLET | Refills: 1 | Status: SHIPPED | OUTPATIENT
Start: 2019-08-26 | End: 2019-08-26

## 2019-08-26 RX ORDER — LISINOPRIL 10 MG/1
10 TABLET ORAL DAILY
Qty: 30 TABLET | Refills: 5
Start: 2019-08-26 | End: 2020-03-30 | Stop reason: ALTCHOICE

## 2019-08-26 RX ORDER — MONTELUKAST SODIUM 10 MG/1
10 TABLET ORAL
Qty: 30 TABLET | Refills: 1 | Status: SHIPPED | OUTPATIENT
Start: 2019-08-26 | End: 2019-09-19 | Stop reason: SDUPTHER

## 2019-08-27 DIAGNOSIS — H65.93 BILATERAL OTITIS MEDIA WITH EFFUSION: ICD-10-CM

## 2019-08-27 RX ORDER — LORATADINE 10 MG/1
TABLET ORAL
Qty: 30 TABLET | Refills: 2 | Status: SHIPPED | OUTPATIENT
Start: 2019-08-27 | End: 2019-11-26 | Stop reason: SDUPTHER

## 2019-09-05 DIAGNOSIS — F51.01 PRIMARY INSOMNIA: Primary | ICD-10-CM

## 2019-09-05 RX ORDER — TRAZODONE HYDROCHLORIDE 100 MG/1
100 TABLET ORAL
Qty: 30 TABLET | Refills: 1 | Status: SHIPPED | OUTPATIENT
Start: 2019-09-05 | End: 2019-09-28 | Stop reason: SDUPTHER

## 2019-09-10 ENCOUNTER — CLINICAL SUPPORT (OUTPATIENT)
Dept: FAMILY MEDICINE CLINIC | Facility: CLINIC | Age: 54
End: 2019-09-10

## 2019-09-10 ENCOUNTER — TELEPHONE (OUTPATIENT)
Dept: FAMILY MEDICINE CLINIC | Facility: CLINIC | Age: 54
End: 2019-09-10

## 2019-09-10 VITALS — DIASTOLIC BLOOD PRESSURE: 90 MMHG | SYSTOLIC BLOOD PRESSURE: 126 MMHG

## 2019-09-10 DIAGNOSIS — I10 ESSENTIAL HYPERTENSION: Primary | ICD-10-CM

## 2019-09-10 DIAGNOSIS — F51.01 PRIMARY INSOMNIA: ICD-10-CM

## 2019-09-10 RX ORDER — TRAZODONE HYDROCHLORIDE 50 MG/1
TABLET ORAL
Qty: 30 TABLET | Refills: 0 | OUTPATIENT
Start: 2019-09-10

## 2019-09-10 NOTE — TELEPHONE ENCOUNTER
Pt here for bp check today  Bp was 126/90  States has been checking it at home and has been good  Bottom number has been in the 70-80's  Need to advise pt what to do

## 2019-09-10 NOTE — TELEPHONE ENCOUNTER
Have Zayda Smith continue with his current medication regime  Make sure he has a follow up appt with me in the near future

## 2019-09-19 DIAGNOSIS — J30.2 SEASONAL ALLERGIC RHINITIS, UNSPECIFIED TRIGGER: ICD-10-CM

## 2019-09-19 RX ORDER — MONTELUKAST SODIUM 10 MG/1
10 TABLET ORAL
Qty: 30 TABLET | Refills: 1 | Status: SHIPPED | OUTPATIENT
Start: 2019-09-19 | End: 2019-10-16 | Stop reason: SDUPTHER

## 2019-09-28 DIAGNOSIS — F51.01 PRIMARY INSOMNIA: ICD-10-CM

## 2019-09-30 RX ORDER — TRAZODONE HYDROCHLORIDE 100 MG/1
TABLET ORAL
Qty: 30 TABLET | Refills: 1 | Status: SHIPPED | OUTPATIENT
Start: 2019-09-30 | End: 2019-10-29 | Stop reason: SDUPTHER

## 2019-10-16 DIAGNOSIS — J30.2 SEASONAL ALLERGIC RHINITIS, UNSPECIFIED TRIGGER: ICD-10-CM

## 2019-10-16 RX ORDER — MONTELUKAST SODIUM 10 MG/1
10 TABLET ORAL
Qty: 30 TABLET | Refills: 1 | Status: SHIPPED | OUTPATIENT
Start: 2019-10-16 | End: 2019-11-08 | Stop reason: SDUPTHER

## 2019-10-21 ENCOUNTER — OFFICE VISIT (OUTPATIENT)
Dept: GASTROENTEROLOGY | Facility: CLINIC | Age: 54
End: 2019-10-21
Payer: COMMERCIAL

## 2019-10-21 VITALS
WEIGHT: 205.4 LBS | TEMPERATURE: 97.6 F | BODY MASS INDEX: 25.54 KG/M2 | DIASTOLIC BLOOD PRESSURE: 86 MMHG | SYSTOLIC BLOOD PRESSURE: 138 MMHG | HEIGHT: 75 IN | HEART RATE: 75 BPM

## 2019-10-21 DIAGNOSIS — K21.9 GASTROESOPHAGEAL REFLUX DISEASE WITHOUT ESOPHAGITIS: Chronic | ICD-10-CM

## 2019-10-21 DIAGNOSIS — R76.8 HEPATITIS C ANTIBODY TEST POSITIVE: Primary | ICD-10-CM

## 2019-10-21 DIAGNOSIS — R74.8 ELEVATED ALKALINE PHOSPHATASE LEVEL: ICD-10-CM

## 2019-10-21 DIAGNOSIS — K22.70 BARRETT'S ESOPHAGUS WITHOUT DYSPLASIA: ICD-10-CM

## 2019-10-21 PROCEDURE — 99214 OFFICE O/P EST MOD 30 MIN: CPT | Performed by: PHYSICIAN ASSISTANT
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
## 2019-10-29 DIAGNOSIS — F51.01 PRIMARY INSOMNIA: ICD-10-CM

## 2019-10-29 RX ORDER — TRAZODONE HYDROCHLORIDE 100 MG/1
TABLET ORAL
Qty: 30 TABLET | Refills: 1 | Status: SHIPPED | OUTPATIENT
Start: 2019-10-29 | End: 2019-11-22 | Stop reason: SDUPTHER

## 2019-11-08 DIAGNOSIS — J30.2 SEASONAL ALLERGIC RHINITIS, UNSPECIFIED TRIGGER: ICD-10-CM

## 2019-11-08 RX ORDER — MONTELUKAST SODIUM 10 MG/1
10 TABLET ORAL
Qty: 30 TABLET | Refills: 1 | Status: SHIPPED | OUTPATIENT
Start: 2019-11-08 | End: 2019-12-11 | Stop reason: SDUPTHER

## 2019-11-22 DIAGNOSIS — F51.01 PRIMARY INSOMNIA: ICD-10-CM

## 2019-11-22 RX ORDER — TRAZODONE HYDROCHLORIDE 100 MG/1
TABLET ORAL
Qty: 30 TABLET | Refills: 1 | Status: SHIPPED | OUTPATIENT
Start: 2019-11-22 | End: 2019-12-19 | Stop reason: SDUPTHER

## 2019-11-26 DIAGNOSIS — H65.93 BILATERAL OTITIS MEDIA WITH EFFUSION: ICD-10-CM

## 2019-11-26 RX ORDER — LORATADINE 10 MG/1
TABLET ORAL
Qty: 30 TABLET | Refills: 2 | Status: SHIPPED | OUTPATIENT
Start: 2019-11-26 | End: 2020-05-24

## 2019-12-11 DIAGNOSIS — J30.2 SEASONAL ALLERGIC RHINITIS, UNSPECIFIED TRIGGER: ICD-10-CM

## 2019-12-11 RX ORDER — MONTELUKAST SODIUM 10 MG/1
10 TABLET ORAL
Qty: 30 TABLET | Refills: 1 | Status: SHIPPED | OUTPATIENT
Start: 2019-12-11 | End: 2020-02-12

## 2019-12-19 DIAGNOSIS — F51.01 PRIMARY INSOMNIA: ICD-10-CM

## 2019-12-19 RX ORDER — TRAZODONE HYDROCHLORIDE 100 MG/1
TABLET ORAL
Qty: 30 TABLET | Refills: 1 | Status: SHIPPED | OUTPATIENT
Start: 2019-12-19 | End: 2020-01-14

## 2020-01-07 DIAGNOSIS — K22.70 BARRETT'S ESOPHAGUS WITHOUT DYSPLASIA: ICD-10-CM

## 2020-01-07 RX ORDER — OMEPRAZOLE 40 MG/1
40 CAPSULE, DELAYED RELEASE ORAL DAILY
Qty: 90 CAPSULE | Refills: 0 | Status: SHIPPED | OUTPATIENT
Start: 2020-01-07 | End: 2020-03-24 | Stop reason: SDUPTHER

## 2020-01-14 DIAGNOSIS — F51.01 PRIMARY INSOMNIA: ICD-10-CM

## 2020-01-14 RX ORDER — TRAZODONE HYDROCHLORIDE 100 MG/1
TABLET ORAL
Qty: 30 TABLET | Refills: 0 | Status: SHIPPED | OUTPATIENT
Start: 2020-01-14 | End: 2020-02-08

## 2020-02-08 DIAGNOSIS — F51.01 PRIMARY INSOMNIA: ICD-10-CM

## 2020-02-08 RX ORDER — TRAZODONE HYDROCHLORIDE 100 MG/1
TABLET ORAL
Qty: 30 TABLET | Refills: 0 | Status: SHIPPED | OUTPATIENT
Start: 2020-02-08 | End: 2020-03-30 | Stop reason: SDUPTHER

## 2020-02-12 DIAGNOSIS — J30.2 SEASONAL ALLERGIC RHINITIS, UNSPECIFIED TRIGGER: ICD-10-CM

## 2020-02-12 RX ORDER — MONTELUKAST SODIUM 10 MG/1
10 TABLET ORAL
Qty: 30 TABLET | Refills: 1 | Status: SHIPPED | OUTPATIENT
Start: 2020-02-12 | End: 2020-03-30 | Stop reason: SDUPTHER

## 2020-02-15 DIAGNOSIS — I10 BENIGN ESSENTIAL HYPERTENSION: ICD-10-CM

## 2020-02-17 RX ORDER — LISINOPRIL 5 MG/1
TABLET ORAL
Qty: 90 TABLET | Refills: 1 | Status: SHIPPED | OUTPATIENT
Start: 2020-02-17 | End: 2020-03-30 | Stop reason: DRUGHIGH

## 2020-03-12 DIAGNOSIS — F51.01 PRIMARY INSOMNIA: ICD-10-CM

## 2020-03-12 RX ORDER — TRAZODONE HYDROCHLORIDE 100 MG/1
TABLET ORAL
Qty: 30 TABLET | Refills: 0 | OUTPATIENT
Start: 2020-03-12

## 2020-03-24 DIAGNOSIS — K22.70 BARRETT'S ESOPHAGUS WITHOUT DYSPLASIA: ICD-10-CM

## 2020-03-24 RX ORDER — OMEPRAZOLE 40 MG/1
40 CAPSULE, DELAYED RELEASE ORAL DAILY
Qty: 90 CAPSULE | Refills: 0 | Status: SHIPPED | OUTPATIENT
Start: 2020-03-24 | End: 2020-06-15

## 2020-03-30 ENCOUNTER — TELEMEDICINE (OUTPATIENT)
Dept: FAMILY MEDICINE CLINIC | Facility: CLINIC | Age: 55
End: 2020-03-30
Payer: COMMERCIAL

## 2020-03-30 VITALS
SYSTOLIC BLOOD PRESSURE: 147 MMHG | DIASTOLIC BLOOD PRESSURE: 91 MMHG | HEART RATE: 51 BPM | WEIGHT: 199.8 LBS | HEIGHT: 75 IN | BODY MASS INDEX: 24.84 KG/M2

## 2020-03-30 DIAGNOSIS — Z13.0 SCREENING FOR IRON DEFICIENCY ANEMIA: ICD-10-CM

## 2020-03-30 DIAGNOSIS — G44.1 VASCULAR HEADACHE: ICD-10-CM

## 2020-03-30 DIAGNOSIS — I10 BENIGN ESSENTIAL HYPERTENSION: ICD-10-CM

## 2020-03-30 DIAGNOSIS — J30.2 SEASONAL ALLERGIC RHINITIS, UNSPECIFIED TRIGGER: ICD-10-CM

## 2020-03-30 DIAGNOSIS — K22.70 BARRETT'S ESOPHAGUS WITHOUT DYSPLASIA: ICD-10-CM

## 2020-03-30 DIAGNOSIS — R05.9 COUGH: ICD-10-CM

## 2020-03-30 DIAGNOSIS — K21.9 GASTROESOPHAGEAL REFLUX DISEASE WITHOUT ESOPHAGITIS: Chronic | ICD-10-CM

## 2020-03-30 DIAGNOSIS — E78.2 MIXED HYPERLIPIDEMIA: Primary | ICD-10-CM

## 2020-03-30 DIAGNOSIS — F51.01 PRIMARY INSOMNIA: ICD-10-CM

## 2020-03-30 DIAGNOSIS — I10 BENIGN ESSENTIAL HYPERTENSION: Primary | ICD-10-CM

## 2020-03-30 PROBLEM — H65.93 BILATERAL OTITIS MEDIA WITH EFFUSION: Status: RESOLVED | Noted: 2019-05-29 | Resolved: 2020-03-30

## 2020-03-30 PROCEDURE — 99214 OFFICE O/P EST MOD 30 MIN: CPT | Performed by: FAMILY MEDICINE

## 2020-03-30 RX ORDER — IRBESARTAN 75 MG/1
75 TABLET ORAL
Qty: 30 TABLET | Refills: 1 | Status: SHIPPED | OUTPATIENT
Start: 2020-03-30 | End: 2020-03-31 | Stop reason: RX

## 2020-03-30 RX ORDER — LISINOPRIL 10 MG/1
10 TABLET ORAL DAILY
Qty: 90 TABLET | Refills: 1 | Status: SHIPPED | OUTPATIENT
Start: 2020-03-30 | End: 2020-03-30 | Stop reason: ALTCHOICE

## 2020-03-30 RX ORDER — TRAZODONE HYDROCHLORIDE 100 MG/1
100 TABLET ORAL
Qty: 90 TABLET | Refills: 1 | Status: SHIPPED | OUTPATIENT
Start: 2020-03-30 | End: 2020-09-18

## 2020-03-30 RX ORDER — LOSARTAN POTASSIUM 50 MG/1
50 TABLET ORAL DAILY
Qty: 30 TABLET | Refills: 5 | Status: SHIPPED | OUTPATIENT
Start: 2020-03-30 | End: 2020-03-30 | Stop reason: RX

## 2020-03-30 RX ORDER — MONTELUKAST SODIUM 10 MG/1
10 TABLET ORAL
Qty: 90 TABLET | Refills: 1 | Status: SHIPPED | OUTPATIENT
Start: 2020-03-30 | End: 2020-10-30

## 2020-03-30 RX ORDER — BENZONATATE 200 MG/1
200 CAPSULE ORAL 3 TIMES DAILY PRN
Qty: 30 CAPSULE | Refills: 5 | Status: SHIPPED | OUTPATIENT
Start: 2020-03-30 | End: 2021-04-22 | Stop reason: ALTCHOICE
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
## 2020-03-30 NOTE — ASSESSMENT & PLAN NOTE
Blood pressure control is suboptimal   He has been taking 5 mg of lisinopril  He also has a cough that is been annoying him  We are going to change him over losartan 50  He is asked to call with report of his blood pressures and his tolerance to losartan  Will discontinue lisinopril

## 2020-03-30 NOTE — ASSESSMENT & PLAN NOTE
Continued efforts at improved diet and exercise    Obtain fasting blood work when current corona situation has resolved

## 2020-03-30 NOTE — ASSESSMENT & PLAN NOTE
Continue with omeprazole  History of Mustafa's esophagus  He has no anorexia weight loss dysphagia X cetera

## 2020-03-31 DIAGNOSIS — I10 BENIGN ESSENTIAL HYPERTENSION: Primary | ICD-10-CM

## 2020-03-31 RX ORDER — OLMESARTAN MEDOXOMIL 5 MG/1
5 TABLET ORAL DAILY
Qty: 30 TABLET | Refills: 1 | Status: SHIPPED | OUTPATIENT
Start: 2020-03-31 | End: 2020-04-24

## 2020-04-24 DIAGNOSIS — I10 BENIGN ESSENTIAL HYPERTENSION: ICD-10-CM

## 2020-04-24 RX ORDER — OLMESARTAN MEDOXOMIL 5 MG/1
TABLET ORAL
Qty: 30 TABLET | Refills: 1 | Status: SHIPPED | OUTPATIENT
Start: 2020-04-24 | End: 2020-05-16

## 2020-05-16 DIAGNOSIS — I10 BENIGN ESSENTIAL HYPERTENSION: ICD-10-CM

## 2020-05-16 RX ORDER — OLMESARTAN MEDOXOMIL 5 MG/1
TABLET ORAL
Qty: 30 TABLET | Refills: 1 | Status: SHIPPED | OUTPATIENT
Start: 2020-05-16 | End: 2020-06-11

## 2020-05-23 DIAGNOSIS — H65.93 BILATERAL OTITIS MEDIA WITH EFFUSION: ICD-10-CM

## 2020-05-24 RX ORDER — LORATADINE 10 MG/1
TABLET ORAL
Qty: 30 TABLET | Refills: 2 | Status: SHIPPED | OUTPATIENT
Start: 2020-05-24 | End: 2020-07-14

## 2020-06-10 DIAGNOSIS — I10 BENIGN ESSENTIAL HYPERTENSION: ICD-10-CM

## 2020-06-11 RX ORDER — OLMESARTAN MEDOXOMIL 5 MG/1
TABLET ORAL
Qty: 30 TABLET | Refills: 1 | Status: SHIPPED | OUTPATIENT
Start: 2020-06-11 | End: 2020-07-13

## 2020-06-15 DIAGNOSIS — K22.70 BARRETT'S ESOPHAGUS WITHOUT DYSPLASIA: ICD-10-CM

## 2020-06-15 RX ORDER — OMEPRAZOLE 40 MG/1
CAPSULE, DELAYED RELEASE ORAL
Qty: 90 CAPSULE | Refills: 0 | Status: SHIPPED | OUTPATIENT
Start: 2020-06-15 | End: 2020-09-09

## 2020-06-23 ENCOUNTER — HOSPITAL ENCOUNTER (OUTPATIENT)
Dept: ULTRASOUND IMAGING | Facility: HOSPITAL | Age: 55
Discharge: HOME | End: 2020-06-23
Payer: COMMERCIAL

## 2020-06-23 ENCOUNTER — OFFICE VISIT (OUTPATIENT)
Dept: FAMILY MEDICINE CLINIC | Facility: CLINIC | Age: 55
End: 2020-06-23
Payer: COMMERCIAL

## 2020-06-23 VITALS
HEART RATE: 72 BPM | BODY MASS INDEX: 25.36 KG/M2 | WEIGHT: 204 LBS | HEIGHT: 75 IN | SYSTOLIC BLOOD PRESSURE: 140 MMHG | TEMPERATURE: 96.1 F | OXYGEN SATURATION: 95 % | DIASTOLIC BLOOD PRESSURE: 90 MMHG

## 2020-06-23 DIAGNOSIS — N50.82 ACUTE PAIN IN SCROTUM: ICD-10-CM

## 2020-06-23 DIAGNOSIS — N50.82 ACUTE PAIN IN SCROTUM: Primary | ICD-10-CM

## 2020-06-23 PROCEDURE — 1036F TOBACCO NON-USER: CPT | Performed by: FAMILY MEDICINE

## 2020-06-23 PROCEDURE — 76870 US EXAM SCROTUM: CPT

## 2020-06-23 PROCEDURE — 3077F SYST BP >= 140 MM HG: CPT | Performed by: FAMILY MEDICINE

## 2020-06-23 PROCEDURE — 3080F DIAST BP >= 90 MM HG: CPT | Performed by: FAMILY MEDICINE

## 2020-06-23 PROCEDURE — 3008F BODY MASS INDEX DOCD: CPT | Performed by: FAMILY MEDICINE

## 2020-06-23 PROCEDURE — 99214 OFFICE O/P EST MOD 30 MIN: CPT | Performed by: FAMILY MEDICINE

## 2020-06-23 RX ORDER — DOXYCYCLINE HYCLATE 100 MG/1
100 CAPSULE ORAL EVERY 12 HOURS SCHEDULED
Qty: 20 CAPSULE | Refills: 0 | Status: SHIPPED | OUTPATIENT
Start: 2020-06-23 | End: 2020-07-03

## 2020-06-23 RX ORDER — PHENOL 1.4 %
20 AEROSOL, SPRAY (ML) MUCOUS MEMBRANE
COMMUNITY

## 2020-06-24 DIAGNOSIS — L03.818 CELLULITIS OF OTHER SPECIFIED SITE: Primary | ICD-10-CM

## 2020-06-24 PROBLEM — L03.90 CELLULITIS: Status: ACTIVE | Noted: 2020-06-24

## 2020-06-24 RX ORDER — CEFADROXIL 500 MG/1
500 CAPSULE ORAL EVERY 12 HOURS SCHEDULED
Qty: 20 CAPSULE | Refills: 0 | Status: SHIPPED | OUTPATIENT
Start: 2020-06-24 | End: 2020-07-04

## 2020-07-10 DIAGNOSIS — I10 BENIGN ESSENTIAL HYPERTENSION: ICD-10-CM

## 2020-07-13 RX ORDER — OLMESARTAN MEDOXOMIL 5 MG/1
TABLET ORAL
Qty: 30 TABLET | Refills: 0 | Status: SHIPPED | OUTPATIENT
Start: 2020-07-13 | End: 2020-08-06

## 2020-07-14 DIAGNOSIS — H65.93 BILATERAL OTITIS MEDIA WITH EFFUSION: ICD-10-CM

## 2020-07-14 RX ORDER — LORATADINE 10 MG/1
TABLET ORAL
Qty: 90 TABLET | Refills: 0 | Status: SHIPPED | OUTPATIENT
Start: 2020-07-14 | End: 2020-10-22

## 2020-08-05 DIAGNOSIS — I10 BENIGN ESSENTIAL HYPERTENSION: ICD-10-CM

## 2020-08-06 DIAGNOSIS — I10 BENIGN ESSENTIAL HYPERTENSION: ICD-10-CM

## 2020-08-06 RX ORDER — OLMESARTAN MEDOXOMIL 5 MG/1
TABLET ORAL
Qty: 30 TABLET | Refills: 0 | Status: SHIPPED | OUTPATIENT
Start: 2020-08-06 | End: 2020-08-06

## 2020-08-06 RX ORDER — OLMESARTAN MEDOXOMIL 5 MG/1
TABLET ORAL
Qty: 90 TABLET | Refills: 0 | Status: SHIPPED | OUTPATIENT
Start: 2020-08-06 | End: 2020-11-02

## 2020-08-11 ENCOUNTER — OFFICE VISIT (OUTPATIENT)
Dept: FAMILY MEDICINE CLINIC | Facility: CLINIC | Age: 55
End: 2020-08-11
Payer: COMMERCIAL

## 2020-08-11 VITALS
HEIGHT: 75 IN | BODY MASS INDEX: 25.36 KG/M2 | SYSTOLIC BLOOD PRESSURE: 134 MMHG | DIASTOLIC BLOOD PRESSURE: 84 MMHG | WEIGHT: 204 LBS

## 2020-08-11 DIAGNOSIS — F51.01 PRIMARY INSOMNIA: ICD-10-CM

## 2020-08-11 DIAGNOSIS — J30.1 ALLERGIC RHINITIS DUE TO POLLEN, UNSPECIFIED SEASONALITY: Primary | ICD-10-CM

## 2020-08-11 DIAGNOSIS — G44.1 VASCULAR HEADACHE: ICD-10-CM

## 2020-08-11 DIAGNOSIS — I10 BENIGN ESSENTIAL HYPERTENSION: ICD-10-CM

## 2020-08-11 DIAGNOSIS — K21.9 GASTROESOPHAGEAL REFLUX DISEASE WITHOUT ESOPHAGITIS: Chronic | ICD-10-CM

## 2020-08-11 DIAGNOSIS — K22.70 BARRETT'S ESOPHAGUS WITHOUT DYSPLASIA: ICD-10-CM

## 2020-08-11 DIAGNOSIS — F11.20 UNCOMPLICATED OPIOID DEPENDENCE (HCC): ICD-10-CM

## 2020-08-11 DIAGNOSIS — E78.2 MIXED HYPERLIPIDEMIA: ICD-10-CM

## 2020-08-11 PROBLEM — N50.82 ACUTE PAIN IN SCROTUM: Status: RESOLVED | Noted: 2020-06-23 | Resolved: 2020-08-11

## 2020-08-11 PROBLEM — L03.90 CELLULITIS: Status: RESOLVED | Noted: 2020-06-24 | Resolved: 2020-08-11

## 2020-08-11 PROCEDURE — 3008F BODY MASS INDEX DOCD: CPT | Performed by: FAMILY MEDICINE

## 2020-08-11 PROCEDURE — 3075F SYST BP GE 130 - 139MM HG: CPT | Performed by: FAMILY MEDICINE

## 2020-08-11 PROCEDURE — 1036F TOBACCO NON-USER: CPT | Performed by: FAMILY MEDICINE

## 2020-08-11 PROCEDURE — 99214 OFFICE O/P EST MOD 30 MIN: CPT | Performed by: FAMILY MEDICINE

## 2020-08-11 PROCEDURE — 3079F DIAST BP 80-89 MM HG: CPT | Performed by: FAMILY MEDICINE

## 2020-08-11 RX ORDER — FLUTICASONE PROPIONATE 50 MCG
1 SPRAY, SUSPENSION (ML) NASAL DAILY
Qty: 1 BOTTLE | Refills: 5 | Status: SHIPPED | OUTPATIENT
Start: 2020-08-11 | End: 2022-05-02 | Stop reason: SDUPTHER

## 2020-08-11 NOTE — ASSESSMENT & PLAN NOTE
Blood pressure well controlled  Cough resolved with discontinuance of Ace and he is presently taking olmesartan 5 daily

## 2020-08-11 NOTE — PROGRESS NOTES
Assessment/Plan:  Gastroesophageal reflux disease without esophagitis  Continue omeprazole  No worrisome symptoms such as anorexia weight loss X cetera  Mustafa's esophagus without dysplasia  Due for EGD in January of 2022 per GI note    Benign essential hypertension  Blood pressure well controlled  Cough resolved with discontinuance of Ace and he is presently taking olmesartan 5 daily  Vascular headache  Headaches much improved  Rare use of Maxalt  Uncomplicated opioid dependence ([address] )  Continue pain management  Insomnia  Continue trazodone  Mixed hyperlipidemia  Diet exercise  He needs fasting blood work     Diagnoses and all orders for this visit:    Allergic rhinitis due to pollen, unspecified seasonality  -     fluticasone (FLONASE) 50 mcg/act nasal spray; 1 spray into each nostril daily    Uncomplicated opioid dependence ([address] )    Gastroesophageal reflux disease without esophagitis    Mustafa's esophagus without dysplasia    Benign essential hypertension    Vascular headache    Primary insomnia    Mixed hyperlipidemia          Subjective:   Chief Complaint   Patient presents with    Hypertension     med check     Patient ID: Micki Jones is a 54 y o  male  HPI  The patient is a 42-year-old male who presents today for routine follow-up of multiple medical problems including gastroesophageal reflux disease with history of Mustafa's esophagus, essential hypertension, history of vascular headaches, lumbar degenerative disc disease, cervical degenerative disc disease with ambulatory dysfunction, hyperlipidemia among others  Recently he was treated for cellulitis of the scrotum states that resolved quickly  His allergies have been worse and he has been taking Claritin and montelukast but would like a refill of Flonase  His daughter moved in with him on Friday  Ex-wife sent her packing  Patient is very happy to have her back at his home    Home blood pressures have been 110-120 over 60s       The following portions of the patient's history were reviewed and updated as appropriate: allergies, current medications, past family history, past medical history, past social history, past surgical history and problem list     Review of Systems   Constitution: Negative for decreased appetite, malaise/fatigue, weight gain and weight loss  Cardiovascular: Negative for chest pain, irregular heartbeat, leg swelling, orthopnea and paroxysmal nocturnal dyspnea  Respiratory: Negative for cough, shortness of breath, sputum production and wheezing  Endocrine: Negative for polydipsia, polyphagia and polyuria  Hematologic/Lymphatic: Negative for adenopathy and bleeding problem  Does not bruise/bleed easily  Musculoskeletal: Positive for back pain and stiffness  Gastrointestinal: Negative for bowel incontinence, constipation and diarrhea  Hard stool from opioids   Genitourinary: Positive for dysuria  Negative for flank pain, frequency, hematuria and urgency  Neurological: Positive for headaches  Rare HA, 1 recent Maxalt use   Psychiatric/Behavioral: Negative for depression and suicidal ideas  The patient does not have insomnia and is not nervous/anxious  Objective:    Physical Exam   Constitutional: He is oriented to person, place, and time  He appears well-developed and well-nourished  No distress  Neck: No thyromegaly present  Cardiovascular: Normal rate, regular rhythm and normal heart sounds  Pulmonary/Chest: Effort normal and breath sounds normal    Musculoskeletal:         General: Tenderness present  No edema  Comments: Antalgic gait, using cane   Lymphadenopathy:     He has no cervical adenopathy  Neurological: He is alert and oriented to person, place, and time  Coordination abnormal    Psychiatric: He has a normal mood and affect  His behavior is normal  Judgment and thought content normal    Nursing note and vitals reviewed        Wt Readings from Last 12 Encounters:   08/11/20 92 5 kg (204 lb)   06/23/20 92 5 kg (204 lb)   03/30/20 90 6 kg (199 lb 12 8 oz)   10/21/19 93 2 kg (205 lb 6 4 oz)   08/19/19 90 7 kg (200 lb)   06/06/19 88 5 kg (195 lb)   05/29/19 90 3 kg (199 lb)   02/22/19 88 5 kg (195 lb)   01/15/19 89 8 kg (198 lb)   01/03/19 88 5 kg (195 lb)   12/04/18 88 5 kg (195 lb)   11/02/18 86 2 kg (190 lb)   ]

## 2020-09-09 DIAGNOSIS — K22.70 BARRETT'S ESOPHAGUS WITHOUT DYSPLASIA: ICD-10-CM

## 2020-09-09 RX ORDER — OMEPRAZOLE 40 MG/1
CAPSULE, DELAYED RELEASE ORAL
Qty: 90 CAPSULE | Refills: 0 | Status: SHIPPED | OUTPATIENT
Start: 2020-09-09 | End: 2020-12-17

## 2020-09-18 DIAGNOSIS — F51.01 PRIMARY INSOMNIA: ICD-10-CM

## 2020-09-18 RX ORDER — TRAZODONE HYDROCHLORIDE 100 MG/1
TABLET ORAL
Qty: 90 TABLET | Refills: 1 | Status: SHIPPED | OUTPATIENT
Start: 2020-09-18 | End: 2021-03-15

## 2020-10-22 DIAGNOSIS — H65.93 BILATERAL OTITIS MEDIA WITH EFFUSION: ICD-10-CM

## 2020-10-22 RX ORDER — LORATADINE 10 MG/1
TABLET ORAL
Qty: 90 TABLET | Refills: 0 | Status: SHIPPED | OUTPATIENT
Start: 2020-10-22 | End: 2020-12-10

## 2020-10-30 DIAGNOSIS — J30.2 SEASONAL ALLERGIC RHINITIS, UNSPECIFIED TRIGGER: ICD-10-CM

## 2020-10-30 RX ORDER — MONTELUKAST SODIUM 10 MG/1
TABLET ORAL
Qty: 90 TABLET | Refills: 1 | Status: SHIPPED | OUTPATIENT
Start: 2020-10-30 | End: 2021-04-22 | Stop reason: SDUPTHER

## 2020-11-02 DIAGNOSIS — I10 BENIGN ESSENTIAL HYPERTENSION: ICD-10-CM

## 2020-11-02 RX ORDER — OLMESARTAN MEDOXOMIL 5 MG/1
TABLET ORAL
Qty: 90 TABLET | Refills: 0 | Status: SHIPPED | OUTPATIENT
Start: 2020-11-02 | End: 2021-01-19

## 2020-12-10 DIAGNOSIS — H65.93 BILATERAL OTITIS MEDIA WITH EFFUSION: ICD-10-CM

## 2020-12-10 RX ORDER — LORATADINE 10 MG/1
TABLET ORAL
Qty: 90 TABLET | Refills: 0 | Status: SHIPPED | OUTPATIENT
Start: 2020-12-10 | End: 2021-03-25

## 2020-12-17 DIAGNOSIS — K22.70 BARRETT'S ESOPHAGUS WITHOUT DYSPLASIA: ICD-10-CM

## 2020-12-17 RX ORDER — OMEPRAZOLE 40 MG/1
CAPSULE, DELAYED RELEASE ORAL
Qty: 90 CAPSULE | Refills: 0 | Status: SHIPPED | OUTPATIENT
Start: 2020-12-17 | End: 2021-03-15

## 2021-01-19 DIAGNOSIS — I10 BENIGN ESSENTIAL HYPERTENSION: ICD-10-CM

## 2021-01-19 RX ORDER — OLMESARTAN MEDOXOMIL 5 MG/1
TABLET ORAL
Qty: 90 TABLET | Refills: 0 | Status: SHIPPED | OUTPATIENT
Start: 2021-01-19 | End: 2021-04-19

## 2021-03-14 DIAGNOSIS — K22.70 BARRETT'S ESOPHAGUS WITHOUT DYSPLASIA: ICD-10-CM

## 2021-03-15 DIAGNOSIS — F51.01 PRIMARY INSOMNIA: ICD-10-CM

## 2021-03-15 RX ORDER — OMEPRAZOLE 40 MG/1
CAPSULE, DELAYED RELEASE ORAL
Qty: 90 CAPSULE | Refills: 0 | Status: SHIPPED | OUTPATIENT
Start: 2021-03-15 | End: 2021-05-17

## 2021-03-15 RX ORDER — TRAZODONE HYDROCHLORIDE 100 MG/1
TABLET ORAL
Qty: 90 TABLET | Refills: 1 | Status: SHIPPED | OUTPATIENT
Start: 2021-03-15 | End: 2021-03-18 | Stop reason: SDUPTHER

## 2021-03-18 DIAGNOSIS — F51.01 PRIMARY INSOMNIA: ICD-10-CM

## 2021-03-18 RX ORDER — TRAZODONE HYDROCHLORIDE 100 MG/1
100 TABLET ORAL
Qty: 90 TABLET | Refills: 0 | Status: SHIPPED | OUTPATIENT
Start: 2021-03-18 | End: 2021-12-13

## 2021-03-25 DIAGNOSIS — H65.93 BILATERAL OTITIS MEDIA WITH EFFUSION: ICD-10-CM

## 2021-03-25 RX ORDER — LORATADINE 10 MG/1
TABLET ORAL
Qty: 90 TABLET | Refills: 0 | Status: SHIPPED | OUTPATIENT
Start: 2021-03-25 | End: 2021-05-17

## 2021-04-17 DIAGNOSIS — I10 BENIGN ESSENTIAL HYPERTENSION: ICD-10-CM

## 2021-04-19 RX ORDER — OLMESARTAN MEDOXOMIL 5 MG/1
TABLET ORAL
Qty: 90 TABLET | Refills: 0 | Status: SHIPPED | OUTPATIENT
Start: 2021-04-19 | End: 2021-05-17

## 2021-04-22 ENCOUNTER — OFFICE VISIT (OUTPATIENT)
Dept: FAMILY MEDICINE CLINIC | Facility: CLINIC | Age: 56
End: 2021-04-22
Payer: COMMERCIAL

## 2021-04-22 DIAGNOSIS — E78.2 MIXED HYPERLIPIDEMIA: Primary | ICD-10-CM

## 2021-04-22 DIAGNOSIS — I10 BENIGN ESSENTIAL HYPERTENSION: ICD-10-CM

## 2021-04-22 DIAGNOSIS — Z13.0 SCREENING FOR IRON DEFICIENCY ANEMIA: ICD-10-CM

## 2021-04-22 DIAGNOSIS — K21.9 GASTROESOPHAGEAL REFLUX DISEASE WITHOUT ESOPHAGITIS: Chronic | ICD-10-CM

## 2021-04-22 DIAGNOSIS — J30.1 ALLERGIC RHINITIS DUE TO POLLEN, UNSPECIFIED SEASONALITY: ICD-10-CM

## 2021-04-22 DIAGNOSIS — J30.2 SEASONAL ALLERGIC RHINITIS, UNSPECIFIED TRIGGER: ICD-10-CM

## 2021-04-22 DIAGNOSIS — Z11.4 SCREENING FOR HIV (HUMAN IMMUNODEFICIENCY VIRUS): ICD-10-CM

## 2021-04-22 DIAGNOSIS — L57.0 KERATOSIS: ICD-10-CM

## 2021-04-22 DIAGNOSIS — J30.1 SEASONAL ALLERGIC RHINITIS DUE TO POLLEN: ICD-10-CM

## 2021-04-22 DIAGNOSIS — Z12.5 SCREENING FOR PROSTATE CANCER: ICD-10-CM

## 2021-04-22 DIAGNOSIS — F51.01 PRIMARY INSOMNIA: ICD-10-CM

## 2021-04-22 DIAGNOSIS — F11.20 UNCOMPLICATED OPIOID DEPENDENCE (HCC): ICD-10-CM

## 2021-04-22 PROCEDURE — 99214 OFFICE O/P EST MOD 30 MIN: CPT | Performed by: FAMILY MEDICINE

## 2021-04-22 PROCEDURE — 3008F BODY MASS INDEX DOCD: CPT | Performed by: FAMILY MEDICINE

## 2021-04-22 PROCEDURE — 17000 DESTRUCT PREMALG LESION: CPT | Performed by: FAMILY MEDICINE

## 2021-04-22 PROCEDURE — 1036F TOBACCO NON-USER: CPT | Performed by: FAMILY MEDICINE

## 2021-04-22 PROCEDURE — 36415 COLL VENOUS BLD VENIPUNCTURE: CPT | Performed by: FAMILY MEDICINE

## 2021-04-22 PROCEDURE — 3079F DIAST BP 80-89 MM HG: CPT | Performed by: FAMILY MEDICINE

## 2021-04-22 PROCEDURE — 3075F SYST BP GE 130 - 139MM HG: CPT | Performed by: FAMILY MEDICINE

## 2021-04-22 PROCEDURE — 3725F SCREEN DEPRESSION PERFORMED: CPT | Performed by: FAMILY MEDICINE

## 2021-04-22 RX ORDER — MONTELUKAST SODIUM 10 MG/1
10 TABLET ORAL
Qty: 90 TABLET | Refills: 1 | Status: SHIPPED | OUTPATIENT
Start: 2021-04-22 | End: 2021-10-21
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
## 2021-04-24 VITALS
HEIGHT: 75 IN | SYSTOLIC BLOOD PRESSURE: 132 MMHG | BODY MASS INDEX: 24.62 KG/M2 | DIASTOLIC BLOOD PRESSURE: 80 MMHG | WEIGHT: 198 LBS

## 2021-04-24 PROBLEM — L57.0 KERATOSIS: Status: ACTIVE | Noted: 2021-04-24

## 2021-04-24 LAB
ALBUMIN SERPL-MCNC: 4.2 G/DL (ref 3.6–5.1)
ALBUMIN/GLOB SERPL: 1.8 (CALC) (ref 1–2.5)
ALP SERPL-CCNC: 84 U/L (ref 35–144)
ALT SERPL-CCNC: 14 U/L (ref 9–46)
AST SERPL-CCNC: 17 U/L (ref 10–35)
BILIRUB SERPL-MCNC: 0.6 MG/DL (ref 0.2–1.2)
BUN SERPL-MCNC: 16 MG/DL (ref 7–25)
BUN/CREAT SERPL: ABNORMAL (CALC) (ref 6–22)
CALCIUM SERPL-MCNC: 9.1 MG/DL (ref 8.6–10.3)
CHLORIDE SERPL-SCNC: 106 MMOL/L (ref 98–110)
CHOLEST SERPL-MCNC: 195 MG/DL
CHOLEST/HDLC SERPL: 2.7 (CALC)
CO2 SERPL-SCNC: 21 MMOL/L (ref 20–32)
CREAT SERPL-MCNC: 0.81 MG/DL (ref 0.7–1.33)
ERYTHROCYTE [DISTWIDTH] IN BLOOD BY AUTOMATED COUNT: 12.5 % (ref 11–15)
GLOBULIN SER CALC-MCNC: 2.3 G/DL (CALC) (ref 1.9–3.7)
GLUCOSE SERPL-MCNC: 103 MG/DL (ref 65–99)
HCT VFR BLD AUTO: 43.4 % (ref 38.5–50)
HDLC SERPL-MCNC: 71 MG/DL
HGB BLD-MCNC: 14.2 G/DL (ref 13.2–17.1)
HIV 1+2 AB+HIV1 P24 AG SERPL QL IA: NORMAL
LDLC SERPL CALC-MCNC: 106 MG/DL (CALC)
MCH RBC QN AUTO: 31.8 PG (ref 27–33)
MCHC RBC AUTO-ENTMCNC: 32.7 G/DL (ref 32–36)
MCV RBC AUTO: 97.3 FL (ref 80–100)
NONHDLC SERPL-MCNC: 124 MG/DL (CALC)
PLATELET # BLD AUTO: 279 THOUSAND/UL (ref 140–400)
PMV BLD REES-ECKER: 9.6 FL (ref 7.5–12.5)
POTASSIUM SERPL-SCNC: 4 MMOL/L (ref 3.5–5.3)
PROT SERPL-MCNC: 6.5 G/DL (ref 6.1–8.1)
PSA SERPL-MCNC: 0.8 NG/ML
RBC # BLD AUTO: 4.46 MILLION/UL (ref 4.2–5.8)
SL AMB EGFR AFRICAN AMERICAN: 115 ML/MIN/1.73M2
SL AMB EGFR NON AFRICAN AMERICAN: 99 ML/MIN/1.73M2
SODIUM SERPL-SCNC: 139 MMOL/L (ref 135–146)
TRIGL SERPL-MCNC: 89 MG/DL
WBC # BLD AUTO: 5.4 THOUSAND/UL (ref 3.8–10.8)

## 2021-04-24 NOTE — ASSESSMENT & PLAN NOTE
He has a keratotic lesion of his right lateral calf  Seborrheic verses actinic  Treated with cryotherapy as noted in note  He is asked to follow-up in 2 weeks if it is not completely resolved or sooner as needed  He agrees

## 2021-05-15 DIAGNOSIS — I10 BENIGN ESSENTIAL HYPERTENSION: ICD-10-CM

## 2021-05-15 DIAGNOSIS — K22.70 BARRETT'S ESOPHAGUS WITHOUT DYSPLASIA: ICD-10-CM

## 2021-05-15 DIAGNOSIS — H65.93 BILATERAL OTITIS MEDIA WITH EFFUSION: ICD-10-CM

## 2021-05-17 RX ORDER — OMEPRAZOLE 40 MG/1
CAPSULE, DELAYED RELEASE ORAL
Qty: 90 CAPSULE | Refills: 0 | Status: SHIPPED | OUTPATIENT
Start: 2021-05-17 | End: 2021-08-24 | Stop reason: SDUPTHER

## 2021-05-17 RX ORDER — OLMESARTAN MEDOXOMIL 5 MG/1
TABLET ORAL
Qty: 90 TABLET | Refills: 0 | Status: SHIPPED | OUTPATIENT
Start: 2021-05-17 | End: 2021-09-22

## 2021-05-17 RX ORDER — LORATADINE 10 MG/1
TABLET ORAL
Qty: 90 TABLET | Refills: 0 | Status: SHIPPED | OUTPATIENT
Start: 2021-05-17 | End: 2021-08-24 | Stop reason: SDUPTHER

## 2021-07-26 ENCOUNTER — TELEPHONE (OUTPATIENT)
Dept: GASTROENTEROLOGY | Facility: AMBULARY SURGERY CENTER | Age: 56
End: 2021-07-26

## 2021-07-26 NOTE — TELEPHONE ENCOUNTER
Received voice mail from patient  Last seen 10/2019  Hepatitis C, elevated alkaline phosphatase, gerd, barretts    Patient started work up for hepatitis c but never started treatment  He received 1st and 2nd dose hep a/b vaccine but 2nd dose was outside of 6 month period  He is now due for 3rd dose hep a/b vaccine and wants to verify if it is ok to receive since 2nd dose was late  Ok to proceed with 3rd dose hep a/b vaccine to complete series  Will verify immunity with required blood test for hepatitis c treatment  **clerical -please contact patient to schedule for follow up visit for hepatitis c treatment  I attempted to schedule at [facility] at 8:45 but got pop up message    Thank you

## 2021-08-04 ENCOUNTER — OFFICE VISIT (OUTPATIENT)
Dept: GASTROENTEROLOGY | Facility: CLINIC | Age: 56
End: 2021-08-04
Payer: COMMERCIAL

## 2021-08-04 VITALS
HEART RATE: 85 BPM | HEIGHT: 75 IN | BODY MASS INDEX: 23.75 KG/M2 | SYSTOLIC BLOOD PRESSURE: 128 MMHG | WEIGHT: 191 LBS | TEMPERATURE: 96 F | DIASTOLIC BLOOD PRESSURE: 70 MMHG

## 2021-08-04 DIAGNOSIS — D12.6 ADENOMA OF COLON: ICD-10-CM

## 2021-08-04 DIAGNOSIS — K21.9 GASTROESOPHAGEAL REFLUX DISEASE WITHOUT ESOPHAGITIS: Primary | ICD-10-CM

## 2021-08-04 PROCEDURE — 99214 OFFICE O/P EST MOD 30 MIN: CPT | Performed by: INTERNAL MEDICINE

## 2021-08-04 RX ORDER — CYCLOBENZAPRINE HCL 5 MG
5 TABLET ORAL
COMMUNITY
Start: 2021-07-22
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
## 2021-08-04 NOTE — PATIENT INSTRUCTIONS
Colon/egd on 10/14/21 with Dr Joann Grande at [facility]  Miralax/dulcolax prep instructions given by More Diallo

## 2021-08-24 DIAGNOSIS — K22.70 BARRETT'S ESOPHAGUS WITHOUT DYSPLASIA: ICD-10-CM

## 2021-08-24 DIAGNOSIS — H65.93 BILATERAL OTITIS MEDIA WITH EFFUSION: ICD-10-CM

## 2021-08-24 RX ORDER — LORATADINE 10 MG/1
TABLET ORAL
Qty: 90 TABLET | Refills: 0 | Status: SHIPPED | OUTPATIENT
Start: 2021-08-24 | End: 2021-11-16

## 2021-08-24 RX ORDER — OMEPRAZOLE 40 MG/1
CAPSULE, DELAYED RELEASE ORAL
Qty: 90 CAPSULE | Refills: 0 | Status: SHIPPED | OUTPATIENT
Start: 2021-08-24 | End: 2021-11-16

## 2021-09-09 ENCOUNTER — CLINICAL SUPPORT (OUTPATIENT)
Dept: FAMILY MEDICINE CLINIC | Facility: CLINIC | Age: 56
End: 2021-09-09
Payer: COMMERCIAL

## 2021-09-09 DIAGNOSIS — Z23 ENCOUNTER FOR IMMUNIZATION: Primary | ICD-10-CM

## 2021-09-09 PROCEDURE — 90471 IMMUNIZATION ADMIN: CPT

## 2021-09-09 PROCEDURE — 90715 TDAP VACCINE 7 YRS/> IM: CPT

## 2021-09-22 DIAGNOSIS — I10 BENIGN ESSENTIAL HYPERTENSION: ICD-10-CM

## 2021-09-22 RX ORDER — OLMESARTAN MEDOXOMIL 5 MG/1
TABLET ORAL
Qty: 90 TABLET | Refills: 0 | Status: SHIPPED | OUTPATIENT
Start: 2021-09-22 | End: 2022-02-17

## 2021-10-04 DIAGNOSIS — Z12.11 SCREENING FOR COLON CANCER: Primary | ICD-10-CM

## 2021-10-04 DIAGNOSIS — D12.6 ADENOMA OF COLON: ICD-10-CM

## 2021-10-04 RX ORDER — POLYETHYLENE GLYCOL 3350 17 G/17G
238 POWDER, FOR SOLUTION ORAL ONCE
Qty: 238 G | Refills: 0 | Status: SHIPPED | OUTPATIENT
Start: 2021-10-04 | End: 2022-03-02

## 2021-10-07 ENCOUNTER — TELEPHONE (OUTPATIENT)
Dept: OTHER | Facility: OTHER | Age: 56
End: 2021-10-07

## 2021-10-07 DIAGNOSIS — D12.6 ADENOMA OF COLON: Primary | ICD-10-CM

## 2021-10-08 RX ORDER — SODIUM, POTASSIUM,MAG SULFATES 17.5-3.13G
SOLUTION, RECONSTITUTED, ORAL ORAL
Qty: 354 ML | Refills: 0 | Status: SHIPPED | OUTPATIENT
Start: 2021-10-08 | End: 2022-05-19 | Stop reason: ALTCHOICE

## 2021-10-13 ENCOUNTER — TELEPHONE (OUTPATIENT)
Dept: GASTROENTEROLOGY | Facility: HOSPITAL | Age: 56
End: 2021-10-13

## 2021-10-14 ENCOUNTER — ANESTHESIA (OUTPATIENT)
Dept: GASTROENTEROLOGY | Facility: HOSPITAL | Age: 56
End: 2021-10-14

## 2021-10-14 ENCOUNTER — HOSPITAL ENCOUNTER (OUTPATIENT)
Dept: GASTROENTEROLOGY | Facility: HOSPITAL | Age: 56
Setting detail: OUTPATIENT SURGERY
Discharge: HOME | End: 2021-10-14
Attending: INTERNAL MEDICINE | Admitting: INTERNAL MEDICINE
Payer: COMMERCIAL

## 2021-10-14 ENCOUNTER — ANESTHESIA EVENT (OUTPATIENT)
Dept: GASTROENTEROLOGY | Facility: HOSPITAL | Age: 56
End: 2021-10-14

## 2021-10-14 VITALS
SYSTOLIC BLOOD PRESSURE: 112 MMHG | OXYGEN SATURATION: 98 % | HEART RATE: 59 BPM | WEIGHT: 201 LBS | RESPIRATION RATE: 20 BRPM | DIASTOLIC BLOOD PRESSURE: 72 MMHG | HEIGHT: 75 IN | TEMPERATURE: 95.8 F | BODY MASS INDEX: 24.99 KG/M2

## 2021-10-14 DIAGNOSIS — K21.9 GASTROESOPHAGEAL REFLUX DISEASE WITHOUT ESOPHAGITIS: ICD-10-CM

## 2021-10-14 DIAGNOSIS — D12.6 ADENOMA OF COLON: ICD-10-CM

## 2021-10-14 PROCEDURE — G0105 COLORECTAL SCRN; HI RISK IND: HCPCS | Performed by: INTERNAL MEDICINE

## 2021-10-14 PROCEDURE — 88305 TISSUE EXAM BY PATHOLOGIST: CPT | Performed by: PATHOLOGY

## 2021-10-14 PROCEDURE — 43239 EGD BIOPSY SINGLE/MULTIPLE: CPT | Performed by: INTERNAL MEDICINE

## 2021-10-14 RX ORDER — PROPOFOL 10 MG/ML
INJECTION, EMULSION INTRAVENOUS AS NEEDED
Status: DISCONTINUED | OUTPATIENT
Start: 2021-10-14 | End: 2021-10-14

## 2021-10-14 RX ORDER — PROPOFOL 10 MG/ML
INJECTION, EMULSION INTRAVENOUS CONTINUOUS PRN
Status: DISCONTINUED | OUTPATIENT
Start: 2021-10-14 | End: 2021-10-14

## 2021-10-14 RX ORDER — FENTANYL CITRATE 50 UG/ML
INJECTION, SOLUTION INTRAMUSCULAR; INTRAVENOUS AS NEEDED
Status: DISCONTINUED | OUTPATIENT
Start: 2021-10-14 | End: 2021-10-14

## 2021-10-14 RX ORDER — LIDOCAINE HYDROCHLORIDE 10 MG/ML
INJECTION, SOLUTION EPIDURAL; INFILTRATION; INTRACAUDAL; PERINEURAL AS NEEDED
Status: DISCONTINUED | OUTPATIENT
Start: 2021-10-14 | End: 2021-10-14

## 2021-10-14 RX ORDER — SODIUM CHLORIDE 9 MG/ML
INJECTION, SOLUTION INTRAVENOUS CONTINUOUS PRN
Status: DISCONTINUED | OUTPATIENT
Start: 2021-10-14 | End: 2021-10-14

## 2021-10-14 RX ADMIN — SODIUM CHLORIDE: 0.9 INJECTION, SOLUTION INTRAVENOUS at 10:10

## 2021-10-14 RX ADMIN — PROPOFOL 50 MG: 10 INJECTION, EMULSION INTRAVENOUS at 09:43

## 2021-10-14 RX ADMIN — LIDOCAINE HYDROCHLORIDE 100 MG: 10 INJECTION, SOLUTION EPIDURAL; INFILTRATION; INTRACAUDAL; PERINEURAL at 09:40

## 2021-10-14 RX ADMIN — PROPOFOL 120 MCG/KG/MIN: 10 INJECTION, EMULSION INTRAVENOUS at 09:40

## 2021-10-14 RX ADMIN — PROPOFOL 100 MG: 10 INJECTION, EMULSION INTRAVENOUS at 09:40

## 2021-10-14 RX ADMIN — PROPOFOL 50 MG: 10 INJECTION, EMULSION INTRAVENOUS at 09:45

## 2021-10-14 RX ADMIN — PROPOFOL 50 MG: 10 INJECTION, EMULSION INTRAVENOUS at 09:41

## 2021-10-14 RX ADMIN — PROPOFOL 50 MG: 10 INJECTION, EMULSION INTRAVENOUS at 09:42

## 2021-10-14 RX ADMIN — FENTANYL CITRATE 25 MCG: 50 INJECTION INTRAMUSCULAR; INTRAVENOUS at 09:54

## 2021-10-14 RX ADMIN — SODIUM CHLORIDE: 0.9 INJECTION, SOLUTION INTRAVENOUS at 09:36

## 2021-10-21 ENCOUNTER — TELEPHONE (OUTPATIENT)
Dept: GASTROENTEROLOGY | Facility: CLINIC | Age: 56
End: 2021-10-21

## 2021-10-21 ENCOUNTER — OFFICE VISIT (OUTPATIENT)
Dept: FAMILY MEDICINE CLINIC | Facility: CLINIC | Age: 56
End: 2021-10-21
Payer: COMMERCIAL

## 2021-10-21 VITALS
SYSTOLIC BLOOD PRESSURE: 116 MMHG | TEMPERATURE: 97.4 F | DIASTOLIC BLOOD PRESSURE: 74 MMHG | BODY MASS INDEX: 25.49 KG/M2 | WEIGHT: 205 LBS | OXYGEN SATURATION: 97 % | HEART RATE: 72 BPM | HEIGHT: 75 IN

## 2021-10-21 DIAGNOSIS — J30.2 SEASONAL ALLERGIC RHINITIS, UNSPECIFIED TRIGGER: ICD-10-CM

## 2021-10-21 DIAGNOSIS — K22.70 BARRETT'S ESOPHAGUS WITHOUT DYSPLASIA: Primary | ICD-10-CM

## 2021-10-21 DIAGNOSIS — E78.2 MIXED HYPERLIPIDEMIA: ICD-10-CM

## 2021-10-21 DIAGNOSIS — J30.1 SEASONAL ALLERGIC RHINITIS DUE TO POLLEN: ICD-10-CM

## 2021-10-21 DIAGNOSIS — I10 BENIGN ESSENTIAL HYPERTENSION: ICD-10-CM

## 2021-10-21 DIAGNOSIS — D12.6 ADENOMA OF COLON: ICD-10-CM

## 2021-10-21 PROCEDURE — 3078F DIAST BP <80 MM HG: CPT | Performed by: FAMILY MEDICINE

## 2021-10-21 PROCEDURE — 99214 OFFICE O/P EST MOD 30 MIN: CPT | Performed by: FAMILY MEDICINE

## 2021-10-21 PROCEDURE — 3008F BODY MASS INDEX DOCD: CPT | Performed by: FAMILY MEDICINE

## 2021-10-21 PROCEDURE — 3074F SYST BP LT 130 MM HG: CPT | Performed by: FAMILY MEDICINE

## 2021-10-21 PROCEDURE — 1036F TOBACCO NON-USER: CPT | Performed by: FAMILY MEDICINE

## 2021-10-21 RX ORDER — MONTELUKAST SODIUM 10 MG/1
TABLET ORAL
Qty: 90 TABLET | Refills: 1 | Status: SHIPPED | OUTPATIENT
Start: 2021-10-21 | End: 2021-10-21 | Stop reason: SDUPTHER

## 2021-10-21 RX ORDER — MONTELUKAST SODIUM 10 MG/1
10 TABLET ORAL
Qty: 90 TABLET | Refills: 2 | Status: SHIPPED | OUTPATIENT
Start: 2021-10-21

## 2021-11-16 DIAGNOSIS — K22.70 BARRETT'S ESOPHAGUS WITHOUT DYSPLASIA: ICD-10-CM

## 2021-11-16 DIAGNOSIS — H65.93 BILATERAL OTITIS MEDIA WITH EFFUSION: ICD-10-CM

## 2021-11-16 RX ORDER — LORATADINE 10 MG/1
TABLET ORAL
Qty: 90 TABLET | Refills: 0 | Status: SHIPPED | OUTPATIENT
Start: 2021-11-16 | End: 2022-02-08

## 2021-11-16 RX ORDER — OMEPRAZOLE 40 MG/1
CAPSULE, DELAYED RELEASE ORAL
Qty: 90 CAPSULE | Refills: 0 | Status: SHIPPED | OUTPATIENT
Start: 2021-11-16 | End: 2022-02-08

## 2021-12-12 DIAGNOSIS — F51.01 PRIMARY INSOMNIA: ICD-10-CM

## 2021-12-13 RX ORDER — TRAZODONE HYDROCHLORIDE 100 MG/1
TABLET ORAL
Qty: 90 TABLET | Refills: 1 | Status: SHIPPED | OUTPATIENT
Start: 2021-12-13 | End: 2022-06-06

## 2022-02-08 DIAGNOSIS — H65.93 BILATERAL OTITIS MEDIA WITH EFFUSION: ICD-10-CM

## 2022-02-08 DIAGNOSIS — K22.70 BARRETT'S ESOPHAGUS WITHOUT DYSPLASIA: ICD-10-CM

## 2022-02-08 RX ORDER — LORATADINE 10 MG/1
TABLET ORAL
Qty: 90 TABLET | Refills: 0 | Status: SHIPPED | OUTPATIENT
Start: 2022-02-08 | End: 2022-04-04

## 2022-02-08 RX ORDER — OMEPRAZOLE 40 MG/1
CAPSULE, DELAYED RELEASE ORAL
Qty: 90 CAPSULE | Refills: 0 | Status: SHIPPED | OUTPATIENT
Start: 2022-02-08 | End: 2022-05-09

## 2022-02-17 DIAGNOSIS — I10 BENIGN ESSENTIAL HYPERTENSION: ICD-10-CM

## 2022-02-17 RX ORDER — OLMESARTAN MEDOXOMIL 5 MG/1
TABLET ORAL
Qty: 90 TABLET | Refills: 0 | Status: SHIPPED | OUTPATIENT
Start: 2022-02-17 | End: 2022-05-16

## 2022-03-02 ENCOUNTER — OFFICE VISIT (OUTPATIENT)
Dept: GASTROENTEROLOGY | Facility: CLINIC | Age: 57
End: 2022-03-02
Payer: COMMERCIAL

## 2022-03-02 VITALS
TEMPERATURE: 97.4 F | BODY MASS INDEX: 25.86 KG/M2 | DIASTOLIC BLOOD PRESSURE: 81 MMHG | HEIGHT: 75 IN | WEIGHT: 208 LBS | SYSTOLIC BLOOD PRESSURE: 131 MMHG

## 2022-03-02 DIAGNOSIS — K59.03 DRUG-INDUCED CONSTIPATION: ICD-10-CM

## 2022-03-02 DIAGNOSIS — K22.70 BARRETT'S ESOPHAGUS WITHOUT DYSPLASIA: Primary | ICD-10-CM

## 2022-03-02 DIAGNOSIS — D12.6 ADENOMA OF COLON: ICD-10-CM

## 2022-03-02 DIAGNOSIS — K21.9 GASTROESOPHAGEAL REFLUX DISEASE WITHOUT ESOPHAGITIS: Chronic | ICD-10-CM

## 2022-03-02 PROCEDURE — 3079F DIAST BP 80-89 MM HG: CPT | Performed by: INTERNAL MEDICINE

## 2022-03-02 PROCEDURE — 3075F SYST BP GE 130 - 139MM HG: CPT | Performed by: INTERNAL MEDICINE

## 2022-03-02 PROCEDURE — 99214 OFFICE O/P EST MOD 30 MIN: CPT | Performed by: INTERNAL MEDICINE

## 2022-03-02 PROCEDURE — 3008F BODY MASS INDEX DOCD: CPT | Performed by: INTERNAL MEDICINE

## 2022-03-02 PROCEDURE — 1036F TOBACCO NON-USER: CPT | Performed by: INTERNAL MEDICINE

## 2022-03-02 RX ORDER — POLYETHYLENE GLYCOL 3350 17 G/17G
17 POWDER, FOR SOLUTION ORAL DAILY
Qty: 60 EACH | Refills: 1 | Status: SHIPPED | OUTPATIENT
Start: 2022-03-02 | End: 2022-05-19 | Stop reason: ALTCHOICE

## 2022-03-02 NOTE — ASSESSMENT & PLAN NOTE
In setting of chronic hydrocodone-acetaminophen use  Reports having bowel movement every 1-2 days but strains to pass stools which are also described as small and hard  Colonoscopy 10/2021 with hyperplastic polyps in the rectum, small external hemorrhoids    · Encouraged continued hydration with goal of 8-10 glasses of water daily  · Recommended dietary fiber supplementation with Benefiber OTC with goal of 20-30 gm fiber daily  · Will also send prescription for Miralax 1-2 daily  · Follow-up in 3 months; if continued constipation, can consider starting Amitiza or Movantik in setting of chronic opioid use

## 2022-03-02 NOTE — PATIENT INSTRUCTIONS
Continue to drink plenty of water (8-10 glasses a day)  Buy Benefiber over-the-counter and take this to supplement fiber in your diet (goal of 20-30 gm of fiber daily)  We will start you on Miralax 1-2 times a day; once your constipation is better, you can decrease the dose to every other day or half-dose daily  Continue taking the omeprazole  You'll need to have another EGD in 3 years  You need to have your next colonoscopy in 5 years

## 2022-03-02 NOTE — ASSESSMENT & PLAN NOTE
EGD 10/2021 with C0M2 Mustafa's esophagus (IM on biopsies) without dysplasia    · Continue omeprazole daily  · Repeat EGD in 3 years for continued Mustafa's surveillance
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
## 2022-03-02 NOTE — ASSESSMENT & PLAN NOTE
Symptoms relatively well-controlled  In setting of large hiatal hernia  No other alarming symptoms  Recent EGD findings noted    · Continue omeprazole 40 mg daily; reviewed instructions to take medication 30 minutes before breakfast and other medications 1 hour later

## 2022-03-02 NOTE — ASSESSMENT & PLAN NOTE
Recent colonoscopy on 10/2021 reviewed  Previous colonoscopy in 2016 with 3 tubular adenomas (ascending colon x2, hepatic flexure) and also tubulovillous adenoma in transverse colon (no dysplasia)     · Recommend repeat colonoscopy in 5 years given history of advanced adenoma (tubulovillous adenoma) on colonoscopy in 2016

## 2022-04-04 DIAGNOSIS — H65.93 BILATERAL OTITIS MEDIA WITH EFFUSION: ICD-10-CM

## 2022-04-04 RX ORDER — LORATADINE 10 MG/1
TABLET ORAL
Qty: 90 TABLET | Refills: 0 | Status: SHIPPED | OUTPATIENT
Start: 2022-04-04 | End: 2022-07-14

## 2022-04-11 ENCOUNTER — TELEPHONE (OUTPATIENT)
Dept: GASTROENTEROLOGY | Facility: CLINIC | Age: 57
End: 2022-04-11

## 2022-04-11 NOTE — TELEPHONE ENCOUNTER
Called and left a message for pt to rescheduled appt (per request from Dr Everardo Crews) from 6/15/2022 to a different day

## 2022-05-02 DIAGNOSIS — J30.1 ALLERGIC RHINITIS DUE TO POLLEN, UNSPECIFIED SEASONALITY: ICD-10-CM

## 2022-05-02 RX ORDER — FLUTICASONE PROPIONATE 50 MCG
1 SPRAY, SUSPENSION (ML) NASAL DAILY
Qty: 18.2 ML | Refills: 2 | Status: SHIPPED | OUTPATIENT
Start: 2022-05-02 | End: 2022-08-10

## 2022-05-08 DIAGNOSIS — K22.70 BARRETT'S ESOPHAGUS WITHOUT DYSPLASIA: ICD-10-CM

## 2022-05-09 RX ORDER — OMEPRAZOLE 40 MG/1
CAPSULE, DELAYED RELEASE ORAL
Qty: 90 CAPSULE | Refills: 0 | Status: SHIPPED | OUTPATIENT
Start: 2022-05-09 | End: 2022-08-04

## 2022-05-15 DIAGNOSIS — I10 BENIGN ESSENTIAL HYPERTENSION: ICD-10-CM

## 2022-05-16 RX ORDER — OLMESARTAN MEDOXOMIL 5 MG/1
TABLET ORAL
Qty: 90 TABLET | Refills: 0 | Status: SHIPPED | OUTPATIENT
Start: 2022-05-16

## 2022-05-19 ENCOUNTER — OFFICE VISIT (OUTPATIENT)
Dept: FAMILY MEDICINE CLINIC | Facility: CLINIC | Age: 57
End: 2022-05-19
Payer: COMMERCIAL

## 2022-05-19 VITALS
BODY MASS INDEX: 25.24 KG/M2 | SYSTOLIC BLOOD PRESSURE: 138 MMHG | DIASTOLIC BLOOD PRESSURE: 80 MMHG | HEIGHT: 75 IN | WEIGHT: 203 LBS

## 2022-05-19 DIAGNOSIS — Z12.5 SCREENING FOR PROSTATE CANCER: ICD-10-CM

## 2022-05-19 DIAGNOSIS — Z87.891 HISTORY OF TOBACCO USE: ICD-10-CM

## 2022-05-19 DIAGNOSIS — K22.70 BARRETT'S ESOPHAGUS WITHOUT DYSPLASIA: ICD-10-CM

## 2022-05-19 DIAGNOSIS — R79.89 ABNORMAL LFTS (LIVER FUNCTION TESTS): ICD-10-CM

## 2022-05-19 DIAGNOSIS — J30.1 SEASONAL ALLERGIC RHINITIS DUE TO POLLEN: ICD-10-CM

## 2022-05-19 DIAGNOSIS — F11.20 UNCOMPLICATED OPIOID DEPENDENCE (HCC): ICD-10-CM

## 2022-05-19 DIAGNOSIS — I10 BENIGN ESSENTIAL HYPERTENSION: Primary | ICD-10-CM

## 2022-05-19 DIAGNOSIS — G44.1 VASCULAR HEADACHE: ICD-10-CM

## 2022-05-19 DIAGNOSIS — D12.6 TUBULAR ADENOMA OF COLON: ICD-10-CM

## 2022-05-19 DIAGNOSIS — F51.01 PRIMARY INSOMNIA: ICD-10-CM

## 2022-05-19 DIAGNOSIS — K21.9 GASTROESOPHAGEAL REFLUX DISEASE WITHOUT ESOPHAGITIS: Chronic | ICD-10-CM

## 2022-05-19 DIAGNOSIS — F33.9 DEPRESSION, RECURRENT (HCC): ICD-10-CM

## 2022-05-19 DIAGNOSIS — E78.2 MIXED HYPERLIPIDEMIA: ICD-10-CM

## 2022-05-19 PROBLEM — B02.9 HERPES ZOSTER: Status: RESOLVED | Noted: 2022-05-19 | Resolved: 2022-05-19

## 2022-05-19 PROBLEM — B02.9 HERPES ZOSTER: Status: ACTIVE | Noted: 2022-05-19

## 2022-05-19 PROCEDURE — 36415 COLL VENOUS BLD VENIPUNCTURE: CPT | Performed by: FAMILY MEDICINE

## 2022-05-19 PROCEDURE — 3008F BODY MASS INDEX DOCD: CPT | Performed by: FAMILY MEDICINE

## 2022-05-19 PROCEDURE — 3079F DIAST BP 80-89 MM HG: CPT | Performed by: FAMILY MEDICINE

## 2022-05-19 PROCEDURE — 3725F SCREEN DEPRESSION PERFORMED: CPT | Performed by: FAMILY MEDICINE

## 2022-05-19 PROCEDURE — 3075F SYST BP GE 130 - 139MM HG: CPT | Performed by: FAMILY MEDICINE

## 2022-05-19 PROCEDURE — 99215 OFFICE O/P EST HI 40 MIN: CPT | Performed by: FAMILY MEDICINE

## 2022-05-19 PROCEDURE — 1036F TOBACCO NON-USER: CPT | Performed by: FAMILY MEDICINE

## 2022-05-19 NOTE — PROGRESS NOTES
Assessment/Plan:  Herpes zoster  Distant and resolved    Insomnia  Still with insomnia but trazodone helpful  Tubular adenoma of colon  Continue follow-up with Gastroenterology   Gastroesophageal reflux disease without esophagitis  Continue follow-up with Gastroenterology, continue with omeprazole, no worrisome symptoms or findings  Mustafa's esophagus without dysplasia  Per Gastroenterology    Seasonal allergic rhinitis due to pollen  Continue with Flonase, loratadine as well as montelukast     Vascular headache  Continue follow-up with Neurology  Benign essential hypertension  Blood pressure control acceptable  Continue with Benicar  CBC and CMP as well as lipids today  Uncomplicated opioid dependence ([facility] [address] )  Continue follow-up with pain management  Mixed hyperlipidemia  Lipid profile today and follow up when results are available  Dep[facility], [facility] ([facility] )  Continue with trazodone  He is not suicidal presently  Abnormal LFTs (liver function tests)  He continues to follow with GI for his history of hepatitis-C  History of tobacco use  We recommended lung CT screen due to his history of tobacco use  We had informed discussion in there was shared decision making  Screening for prostate cancer  Continue with prostate carcinoma screening    Previous PSAs have been normal          Diagnoses and all orders for this visit:    Benign essential hypertension  -     CBC  -     Comprehensive metabolic panel    Mixed hyperlipidemia  -     Lipid panel    Depression, recurrent (HCC)    Uncomplicated opioid dependence ([facility] )    History of tobacco use  -     CT lung screening program; Future    Primary insomnia    Screening for prostate cancer  -     PSA, Total Screen    Tubular adenoma of colon    Gastroesophageal reflux disease without esophagitis    Mustafa's esophagus without dysplasia    Seasonal allergic rhinitis due to pollen    Vascular headache    Abnormal LFTs (liver function tests) we spent 40 minutes in direct contact with the patient discussing his multiple medical problems, current issues in recommendations  Subjective:   No chief complaint on file  Patient ID: Darryl Christie is a 62 y o  male  My allergies are worse  My Botox did not work last time  Dr Miguel Dozier at [facility]  I have been depressed, lost my Dad a month ago, Mom  5 months ago  HPI  The patient is a 60-year-old male who presents today for routine follow-up of multiple medical problems which include allergic rhinitis, essential hypertension, gastroesophageal reflux, chronic insomnia and depression as well as other  He notes that this spring his allergies have been worse  He is compliant with his fluticasone, loratadine as well as montelukast   He does note that he has had some worsening depression  His dad  about a month ago and his mom  about 5 months ago  He is not suicidal   He is compliant with his trazodone he receives Botox for his lower extremity spasticity  He has been following with Dr Brett Hare at [place] [address] but recently has not been overly happy with him  He would like a referral to another provider  He is happy he received 100% disability at a recent hearing  He denies cardiovascular pulmonary complaint presently  The following portions of the patient's history were reviewed and updated as appropriate: allergies, current medications, past family history, past medical history, past social history, past surgical history and problem list     ROS    Per the HPI  Objective:    Physical Exam  Vitals and nursing note reviewed  Constitutional:       Appearance: He is not ill-appearing  Comments: Using a cane and walking with a somewhat spastic appearing gait  Neck:      Vascular: No carotid bruit  Comments: No JVD  Cardiovascular:      Rate and Rhythm: Normal rate and regular rhythm     Pulmonary:      Effort: Pulmonary effort is normal       Breath sounds: Normal breath sounds  Musculoskeletal:      Right lower leg: No edema  Left lower leg: No edema  Neurological:      Mental Status: He is alert and oriented to person, place, and time  Coordination: Coordination abnormal       Gait: Gait abnormal    Psychiatric:         Mood and Affect: Mood normal          Thought Content:  Thought content normal          Judgment: Judgment normal

## 2022-05-20 LAB
ALBUMIN SERPL-MCNC: 4.4 G/DL (ref 3.6–5.1)
ALBUMIN/GLOB SERPL: 1.9 (CALC) (ref 1–2.5)
ALP SERPL-CCNC: 91 U/L (ref 35–144)
ALT SERPL-CCNC: 19 U/L (ref 9–46)
AST SERPL-CCNC: 18 U/L (ref 10–35)
BILIRUB SERPL-MCNC: 0.3 MG/DL (ref 0.2–1.2)
BUN SERPL-MCNC: 20 MG/DL (ref 7–25)
BUN/CREAT SERPL: ABNORMAL (CALC) (ref 6–22)
CALCIUM SERPL-MCNC: 9.2 MG/DL (ref 8.6–10.3)
CHLORIDE SERPL-SCNC: 105 MMOL/L (ref 98–110)
CHOLEST SERPL-MCNC: 204 MG/DL
CHOLEST/HDLC SERPL: 2.8 (CALC)
CO2 SERPL-SCNC: 22 MMOL/L (ref 20–32)
CREAT SERPL-MCNC: 0.77 MG/DL (ref 0.7–1.33)
ERYTHROCYTE [DISTWIDTH] IN BLOOD BY AUTOMATED COUNT: 12.8 % (ref 11–15)
GLOBULIN SER CALC-MCNC: 2.3 G/DL (CALC) (ref 1.9–3.7)
GLUCOSE SERPL-MCNC: 104 MG/DL (ref 65–99)
HCT VFR BLD AUTO: 42.8 % (ref 38.5–50)
HDLC SERPL-MCNC: 73 MG/DL
HGB BLD-MCNC: 14.5 G/DL (ref 13.2–17.1)
LDLC SERPL CALC-MCNC: 111 MG/DL (CALC)
MCH RBC QN AUTO: 32.1 PG (ref 27–33)
MCHC RBC AUTO-ENTMCNC: 33.9 G/DL (ref 32–36)
MCV RBC AUTO: 94.7 FL (ref 80–100)
NONHDLC SERPL-MCNC: 131 MG/DL (CALC)
PLATELET # BLD AUTO: 277 THOUSAND/UL (ref 140–400)
PMV BLD REES-ECKER: 9.2 FL (ref 7.5–12.5)
POTASSIUM SERPL-SCNC: 4.1 MMOL/L (ref 3.5–5.3)
PROT SERPL-MCNC: 6.7 G/DL (ref 6.1–8.1)
PSA SERPL-MCNC: 0.53 NG/ML
RBC # BLD AUTO: 4.52 MILLION/UL (ref 4.2–5.8)
SL AMB EGFR AFRICAN AMERICAN: 117 ML/MIN/1.73M2
SL AMB EGFR NON AFRICAN AMERICAN: 101 ML/MIN/1.73M2
SODIUM SERPL-SCNC: 139 MMOL/L (ref 135–146)
TRIGL SERPL-MCNC: 102 MG/DL
WBC # BLD AUTO: 6.3 THOUSAND/UL (ref 3.8–10.8)

## 2022-05-20 NOTE — ASSESSMENT & PLAN NOTE
Continue follow-up with Gastroenterology, continue with omeprazole, no worrisome symptoms or findings

## 2022-05-20 NOTE — ASSESSMENT & PLAN NOTE
We recommended lung CT screen due to his history of tobacco use  We had informed discussion in there was shared decision making

## 2022-06-06 DIAGNOSIS — F51.01 PRIMARY INSOMNIA: ICD-10-CM

## 2022-06-06 RX ORDER — TRAZODONE HYDROCHLORIDE 100 MG/1
TABLET ORAL
Qty: 90 TABLET | Refills: 1 | Status: SHIPPED | OUTPATIENT
Start: 2022-06-06

## 2022-07-14 DIAGNOSIS — H65.93 BILATERAL OTITIS MEDIA WITH EFFUSION: ICD-10-CM

## 2022-07-14 RX ORDER — LORATADINE 10 MG/1
TABLET ORAL
Qty: 90 TABLET | Refills: 0 | Status: SHIPPED | OUTPATIENT
Start: 2022-07-14

## 2022-08-04 DIAGNOSIS — K22.70 BARRETT'S ESOPHAGUS WITHOUT DYSPLASIA: ICD-10-CM

## 2022-08-04 RX ORDER — OMEPRAZOLE 40 MG/1
CAPSULE, DELAYED RELEASE ORAL
Qty: 90 CAPSULE | Refills: 0 | Status: SHIPPED | OUTPATIENT
Start: 2022-08-04 | End: 2022-10-21

## 2022-08-10 DIAGNOSIS — J30.1 ALLERGIC RHINITIS DUE TO POLLEN, UNSPECIFIED SEASONALITY: ICD-10-CM

## 2022-08-10 RX ORDER — FLUTICASONE PROPIONATE 50 MCG
SPRAY, SUSPENSION (ML) NASAL
Qty: 16 ML | Refills: 2 | Status: SHIPPED | OUTPATIENT
Start: 2022-08-10 | End: 2022-10-07

## 2022-08-19 DIAGNOSIS — I10 BENIGN ESSENTIAL HYPERTENSION: ICD-10-CM

## 2022-08-19 RX ORDER — OLMESARTAN MEDOXOMIL 5 MG/1
TABLET ORAL
Qty: 90 TABLET | Refills: 0 | Status: SHIPPED | OUTPATIENT
Start: 2022-08-19 | End: 2022-10-21

## 2022-08-25 ENCOUNTER — OFFICE VISIT (OUTPATIENT)
Dept: FAMILY MEDICINE CLINIC | Facility: CLINIC | Age: 57
End: 2022-08-25
Payer: COMMERCIAL

## 2022-08-25 VITALS — SYSTOLIC BLOOD PRESSURE: 132 MMHG | DIASTOLIC BLOOD PRESSURE: 82 MMHG

## 2022-08-25 DIAGNOSIS — G44.89 ALLERGIC HEADACHE: Primary | ICD-10-CM

## 2022-08-25 DIAGNOSIS — J01.90 SUBACUTE SINUSITIS, UNSPECIFIED LOCATION: ICD-10-CM

## 2022-08-25 PROCEDURE — 3079F DIAST BP 80-89 MM HG: CPT | Performed by: FAMILY MEDICINE

## 2022-08-25 PROCEDURE — 3075F SYST BP GE 130 - 139MM HG: CPT | Performed by: FAMILY MEDICINE

## 2022-08-25 PROCEDURE — 99214 OFFICE O/P EST MOD 30 MIN: CPT | Performed by: FAMILY MEDICINE

## 2022-08-25 RX ORDER — PREDNISONE 10 MG/1
10 TABLET ORAL DAILY
Qty: 22 TABLET | Refills: 0 | Status: SHIPPED | OUTPATIENT
Start: 2022-08-25 | End: 2022-10-05 | Stop reason: ALTCHOICE

## 2022-08-25 NOTE — PROGRESS NOTES
Assessment/Plan:  Allergic headache  The patient has had a persistent headache for the past couple of months  It has been persistent but seemingly waxes and wanes through the day  He has a history of migraine headache  He has not seen his neurologist recently  I do not see any new focal neurologic findings  I do feel he should try to follow-up with his neurologist   In the meantime we are going to give him a prednisone taper  I do see definite evidence of allergic rhinitis I believe this may be the source of his headache  I do not believe he has an active sinusitis based on his examination and history  He did request referral to Otolaryngology as recommended by urgent care so we did place this request today  We asked him to call back as needed  He agrees with this plan    Subacute sinusitis  I do not believe the has bacterial sinusitis presently but I will refer him to Otolaryngology at his request         Diagnoses and all orders for this visit:    Allergic headache  -     predniSONE 10 mg tablet; Take 1 tablet (10 mg total) by mouth daily 4 daily for 2 days then 3 daily for 2 days then 2 daily for 2 days then 1 daily for 4 days    Subacute sinusitis, unspecified location  -     Ambulatory Referral to Otolaryngology; Future          Subjective:   Chief Complaint   Patient presents with    Headache     Seen at urgent care for sinus infection  Told that if headaches persisted to seek referral to ENT  Patient ID: Champ Young is a 62 y o  male  I have a HA that I cant get rid of  HA for a couple of months  Went to  and received antibiotics  Somewhat helpful but didn't go away  Worse later in the day  Indicates frontal region and occiput  No significant nasal d/c  No sneezing or ocular pruritus  Flonase not helpful, medical MJ no help  Pain meds somewhat, Dr Loni Sever  Neurology gives him rizatriptan       HPI  The patient is a 49-year-old male who presents today with concerns over persistent headache for the past couple of months  It is located in the frontal region though it radiates to his occiput at times  He went to urgent care where he received antibiotics/amoxicillin for a sinus infection  He was told that he should see a your nose and throat specialist   He notes that the amoxicillin did not seem to help much  It does not seem like his typical migraine for which he had seen neurology in the past   He denies sneezing or ocular pruritus  He states the Flonase has not been helpful  He also states that his medical marijuana has giving him no help  He has been using more of his opioid pain medication that he gets from Pain [facility]  No purulent nasal discharge  No visual disturbance  The following portions of the patient's history were reviewed and updated as appropriate: allergies, current medications, past family history, past medical history, past social history, past surgical history and problem list     ROS    Per the HPI  Objective:    Physical Exam  Vitals and nursing note reviewed  HENT:      Right Ear: Tympanic membrane normal       Left Ear: Tympanic membrane normal       Nose: Congestion present  Comments: Boggy and bluish nasal turbinates without purulent nasal discharge  Some bifrontal tenderness  No ethmoidal or maxillary tenderness  Mouth/Throat:      Mouth: Mucous membranes are moist       Pharynx: Oropharynx is clear  No oropharyngeal exudate  Lymphadenopathy:      Cervical: No cervical adenopathy  Neurological:      Mental Status: He is oriented to person, place, and time  Psychiatric:         Thought Content:  Thought content normal          Judgment: Judgment normal

## 2022-08-25 NOTE — ASSESSMENT & PLAN NOTE
I do not believe the has bacterial sinusitis presently but I will refer him to Otolaryngology at his request

## 2022-08-25 NOTE — ASSESSMENT & PLAN NOTE
The patient has had a persistent headache for the past couple of months  It has been persistent but seemingly waxes and wanes through the day  He has a history of migraine headache  He has not seen his neurologist recently  I do not see any new focal neurologic findings  I do feel he should try to follow-up with his neurologist   In the meantime we are going to give him a prednisone taper  I do see definite evidence of allergic rhinitis I believe this may be the source of his headache  I do not believe he has an active sinusitis based on his examination and history  He did request referral to Otolaryngology as recommended by urgent care so we did place this request today  We asked him to call back as needed    He agrees with this plan

## 2022-09-16 ENCOUNTER — HOSPITAL ENCOUNTER (OUTPATIENT)
Dept: RADIOLOGY | Facility: HOSPITAL | Age: 57
Discharge: HOME | End: 2022-09-16
Attending: STUDENT IN AN ORGANIZED HEALTH CARE EDUCATION/TRAINING PROGRAM
Payer: COMMERCIAL

## 2022-09-16 DIAGNOSIS — J32.9 CHRONIC RHINOSINUSITIS: ICD-10-CM

## 2022-09-16 DIAGNOSIS — J31.0 CHRONIC RHINOSINUSITIS: ICD-10-CM

## 2022-09-16 DIAGNOSIS — G43.719 INTRACTABLE CHRONIC MIGRAINE WITHOUT AURA AND WITHOUT STATUS MIGRAINOSUS: ICD-10-CM

## 2022-09-16 PROCEDURE — G1004 CDSM NDSC: HCPCS

## 2022-09-16 PROCEDURE — 70486 CT MAXILLOFACIAL W/O DYE: CPT

## 2022-09-16 RX ORDER — RIZATRIPTAN BENZOATE 5 MG/1
TABLET, ORALLY DISINTEGRATING ORAL
Qty: 9 TABLET | Refills: 1 | Status: SHIPPED | OUTPATIENT
Start: 2022-09-16

## 2022-10-05 ENCOUNTER — OFFICE VISIT (OUTPATIENT)
Dept: FAMILY MEDICINE CLINIC | Facility: CLINIC | Age: 57
End: 2022-10-05
Payer: COMMERCIAL

## 2022-10-05 VITALS
BODY MASS INDEX: 26.36 KG/M2 | HEART RATE: 63 BPM | WEIGHT: 212 LBS | SYSTOLIC BLOOD PRESSURE: 110 MMHG | OXYGEN SATURATION: 98 % | TEMPERATURE: 95 F | DIASTOLIC BLOOD PRESSURE: 80 MMHG | HEIGHT: 75 IN

## 2022-10-05 DIAGNOSIS — Z12.2 ENCOUNTER FOR SCREENING FOR LUNG CANCER: ICD-10-CM

## 2022-10-05 DIAGNOSIS — F11.20 UNCOMPLICATED OPIOID DEPENDENCE (HCC): Chronic | ICD-10-CM

## 2022-10-05 DIAGNOSIS — Z00.00 WELL ADULT EXAM: Primary | ICD-10-CM

## 2022-10-05 DIAGNOSIS — I10 BENIGN ESSENTIAL HYPERTENSION: ICD-10-CM

## 2022-10-05 DIAGNOSIS — F51.01 PRIMARY INSOMNIA: ICD-10-CM

## 2022-10-05 PROBLEM — R79.89 ABNORMAL LFTS (LIVER FUNCTION TESTS): Status: RESOLVED | Noted: 2017-03-10 | Resolved: 2022-10-05

## 2022-10-05 PROCEDURE — 99396 PREV VISIT EST AGE 40-64: CPT | Performed by: FAMILY MEDICINE

## 2022-10-05 RX ORDER — CELECOXIB 200 MG/1
200 CAPSULE ORAL DAILY
COMMUNITY
Start: 2022-08-19 | End: 2022-10-05 | Stop reason: ALTCHOICE

## 2022-10-05 RX ORDER — AMOXICILLIN 500 MG/1
CAPSULE ORAL
COMMUNITY
Start: 2022-07-15 | End: 2022-10-05 | Stop reason: ALTCHOICE

## 2022-10-05 NOTE — PATIENT INSTRUCTIONS
Schedule balloon procedure with ENT    Wellness Visit for Adults   AMBULATORY CARE:   A wellness visit  is when you see your healthcare provider to get screened for health problems  Your healthcare provider will also give you advice on how to stay healthy  Write down your questions so you remember to ask them  Ask your healthcare provider how often you should have a wellness visit  What happens at a wellness visit:  Your healthcare provider will ask about your health, and your family history of health problems  This includes high blood pressure, heart disease, and cancer  He or she will ask if you have symptoms that concern you, if you smoke, and about your mood  You may also be asked about your intake of medicines, supplements, food, and alcohol  Any of the following may be done: Your weight  will be checked  Your height may also be checked so your body mass index (BMI) can be calculated  Your BMI shows if you are at a healthy weight  Your blood pressure  and heart rate will be checked  Your temperature may also be checked  Blood and urine tests  may be done  Blood tests may be done to check your cholesterol levels  Abnormal cholesterol levels increase your risk for heart disease and stroke  You may also need a blood or urine test to check for diabetes if you are at increased risk  Urine tests may be done to look for signs of an infection or kidney disease  A physical exam  includes checking your heartbeat and lungs with a stethoscope  Your healthcare provider may also check your skin to look for sun damage  Screening tests  may be recommended  A screening test is done to check for diseases that may not cause symptoms  The screening tests you may need depend on your age, gender, family history, and lifestyle habits  For example, colorectal screening may be recommended if you are 48years old or older  Screening tests you need if you are a woman:   A Pap smear  is used to screen for cervical cancer  Pap smears are usually done every 3 to 5 years depending on your age  You may need them more often if you have had abnormal Pap smear test results in the past  Ask your healthcare provider how often you should have a Pap smear  A mammogram  is an x-ray of your breasts to screen for breast cancer  Experts recommend mammograms every 2 years starting at age 48 years  You may need a mammogram at age 52 years or younger if you have an increased risk for breast cancer  Talk to your healthcare provider about when you should start having mammograms and how often you need them  Vaccines you may need:   Get an influenza vaccine  every year  The influenza vaccine protects you from the flu  Several types of viruses cause the flu  The viruses change over time, so new vaccines are made each year  Get a tetanus-diphtheria (Td) booster vaccine  every 10 years  This vaccine protects you against tetanus and diphtheria  Tetanus is a severe infection that may cause painful muscle spasms and lockjaw  Diphtheria is a severe bacterial infection that causes a thick covering in the back of your mouth and throat  Get a human papillomavirus (HPV) vaccine  if you are female and aged 23 to 32 or male 23 to 24 and never received it  This vaccine protects you from HPV infection  HPV is the most common infection spread by sexual contact  HPV may also cause vaginal, penile, and anal cancers  Get a pneumococcal vaccine  if you are aged 72 years or older  The pneumococcal vaccine is an injection given to protect you from pneumococcal disease  Pneumococcal disease is an infection caused by pneumococcal bacteria  The infection may cause pneumonia, meningitis, or an ear infection  Get a shingles vaccine  if you are 60 or older, even if you have had shingles before  The shingles vaccine is an injection to protect you from the varicella-zoster virus  This is the same virus that causes chickenpox   Shingles is a painful rash that develops in people who had chickenpox or have been exposed to the virus  How to eat healthy:  My Plate is a model for planning healthy meals  It shows the types and amounts of foods that should go on your plate  Fruits and vegetables make up about half of your plate, and grains and protein make up the other half  A serving of dairy is included on the side of your plate  The amount of calories and serving sizes you need depends on your age, gender, weight, and height  Examples of healthy foods are listed below:  Eat a variety of vegetables  such as dark green, red, and orange vegetables  You can also include canned vegetables low in sodium (salt) and frozen vegetables without added butter or sauces  Eat a variety of fresh fruits , canned fruit in 100% juice, frozen fruit, and dried fruit  Include whole grains  At least half of the grains you eat should be whole grains  Examples include whole-wheat bread, wheat pasta, brown rice, and whole-grain cereals such as oatmeal     Eat a variety of protein foods such as seafood (fish and shellfish), lean meat, and poultry without skin (turkey and chicken)  Examples of lean meats include pork leg, shoulder, or tenderloin, and beef round, sirloin, tenderloin, and extra lean ground beef  Other protein foods include eggs and egg substitutes, beans, peas, soy products, nuts, and seeds  Choose low-fat dairy products such as skim or 1% milk or low-fat yogurt, cheese, and cottage cheese  Limit unhealthy fats  such as butter, hard margarine, and shortening  Exercise:  Exercise at least 30 minutes per day on most days of the week  Some examples of exercise include walking, biking, dancing, and swimming  You can also fit in more physical activity by taking the stairs instead of the elevator or parking farther away from stores  Include muscle strengthening activities 2 days each week  Regular exercise provides many health benefits   It helps you manage your weight, and decreases your risk for type 2 diabetes, heart disease, stroke, and high blood pressure  Exercise can also help improve your mood  Ask your healthcare provider about the best exercise plan for you  General health and safety guidelines:   Do not smoke  Nicotine and other chemicals in cigarettes and cigars can cause lung damage  Ask your healthcare provider for information if you currently smoke and need help to quit  E-cigarettes or smokeless tobacco still contain nicotine  Talk to your healthcare provider before you use these products  Limit alcohol  A drink of alcohol is 12 ounces of beer, 5 ounces of wine, or 1½ ounces of liquor  Lose weight, if needed  Being overweight increases your risk of certain health conditions  These include heart disease, high blood pressure, type 2 diabetes, and certain types of cancer  Protect your skin  Do not sunbathe or use tanning beds  Use sunscreen with a SPF 15 or higher  Apply sunscreen at least 15 minutes before you go outside  Reapply sunscreen every 2 hours  Wear protective clothing, hats, and sunglasses when you are outside  Drive safely  Always wear your seatbelt  Make sure everyone in your car wears a seatbelt  A seatbelt can save your life if you are in an accident  Do not use your cell phone when you are driving  This could distract you and cause an accident  Pull over if you need to make a call or send a text message  Practice safe sex  Use latex condoms if are sexually active and have more than one partner  Your healthcare provider may recommend screening tests for sexually transmitted infections (STIs)  Wear helmets, lifejackets, and protective gear  Always wear a helmet when you ride a bike or motorcycle, go skiing, or play sports that could cause a head injury  Wear protective equipment when you play sports  Wear a lifejacket when you are on a boat or doing water sports      © Copyright [employer] 2022 Information is for End User's use only and may not be sold, redistributed or otherwise used for commercial purposes  All illustrations and images included in CareNotes® are the copyrighted property of A D A M , Inc  or Patricia Coon  The above information is an  only  It is not intended as medical advice for individual conditions or treatments  Talk to your doctor, nurse or pharmacist before following any medical regimen to see if it is safe and effective for you
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
## 2022-10-07 ENCOUNTER — TELEPHONE (OUTPATIENT)
Dept: FAMILY MEDICINE CLINIC | Facility: CLINIC | Age: 57
End: 2022-10-07

## 2022-10-10 PROBLEM — Z00.00 WELL ADULT EXAM: Status: ACTIVE | Noted: 2022-10-10

## 2022-10-10 PROBLEM — Z79.899 MEDICAL MARIJUANA USE: Status: ACTIVE | Noted: 2022-10-10

## 2022-10-11 PROBLEM — Z12.5 SCREENING FOR PROSTATE CANCER: Status: RESOLVED | Noted: 2022-05-19 | Resolved: 2022-10-11

## 2022-10-18 ENCOUNTER — HOSPITAL ENCOUNTER (OUTPATIENT)
Dept: RADIOLOGY | Facility: HOSPITAL | Age: 57
Discharge: HOME | End: 2022-10-18
Payer: COMMERCIAL

## 2022-10-18 DIAGNOSIS — Z87.891 HISTORY OF TOBACCO USE: ICD-10-CM

## 2022-10-18 PROCEDURE — 71271 CT THORAX LUNG CANCER SCR C-: CPT

## 2022-10-21 DIAGNOSIS — J30.1 ALLERGIC RHINITIS DUE TO POLLEN, UNSPECIFIED SEASONALITY: ICD-10-CM

## 2022-10-21 DIAGNOSIS — K22.70 BARRETT'S ESOPHAGUS WITHOUT DYSPLASIA: ICD-10-CM

## 2022-10-21 DIAGNOSIS — I10 BENIGN ESSENTIAL HYPERTENSION: ICD-10-CM

## 2022-10-21 DIAGNOSIS — F51.01 PRIMARY INSOMNIA: ICD-10-CM

## 2022-10-21 DIAGNOSIS — J30.2 SEASONAL ALLERGIC RHINITIS, UNSPECIFIED TRIGGER: ICD-10-CM

## 2022-10-21 RX ORDER — OMEPRAZOLE 40 MG/1
CAPSULE, DELAYED RELEASE ORAL
Qty: 90 CAPSULE | Refills: 0 | Status: SHIPPED | OUTPATIENT
Start: 2022-10-21

## 2022-10-21 RX ORDER — FLUTICASONE PROPIONATE 50 MCG
SPRAY, SUSPENSION (ML) NASAL
Qty: 16 ML | Refills: 2 | Status: SHIPPED | OUTPATIENT
Start: 2022-10-21

## 2022-10-21 RX ORDER — OLMESARTAN MEDOXOMIL 5 MG/1
TABLET ORAL
Qty: 90 TABLET | Refills: 0 | Status: SHIPPED | OUTPATIENT
Start: 2022-10-21

## 2022-10-21 RX ORDER — MONTELUKAST SODIUM 10 MG/1
TABLET ORAL
Qty: 90 TABLET | Refills: 2 | Status: SHIPPED | OUTPATIENT
Start: 2022-10-21

## 2022-10-21 RX ORDER — TRAZODONE HYDROCHLORIDE 100 MG/1
TABLET ORAL
Qty: 90 TABLET | Refills: 1 | Status: SHIPPED | OUTPATIENT
Start: 2022-10-21

## 2022-10-24 PROBLEM — J01.90 SUBACUTE SINUSITIS: Status: RESOLVED | Noted: 2022-08-25 | Resolved: 2022-10-24

## 2022-11-18 DIAGNOSIS — G43.719 INTRACTABLE CHRONIC MIGRAINE WITHOUT AURA AND WITHOUT STATUS MIGRAINOSUS: ICD-10-CM

## 2022-12-09 PROBLEM — Z00.00 WELL ADULT EXAM: Status: RESOLVED | Noted: 2022-10-10 | Resolved: 2022-12-09

## 2023-02-03 DIAGNOSIS — J30.1 ALLERGIC RHINITIS DUE TO POLLEN, UNSPECIFIED SEASONALITY: ICD-10-CM

## 2023-02-03 RX ORDER — FLUTICASONE PROPIONATE 50 MCG
SPRAY, SUSPENSION (ML) NASAL
Qty: 16 ML | Refills: 2 | Status: CANCELLED | OUTPATIENT
Start: 2023-02-03

## 2023-02-13 DIAGNOSIS — I10 BENIGN ESSENTIAL HYPERTENSION: ICD-10-CM

## 2023-02-13 DIAGNOSIS — K22.70 BARRETT'S ESOPHAGUS WITHOUT DYSPLASIA: ICD-10-CM

## 2023-02-13 DIAGNOSIS — H65.93 BILATERAL OTITIS MEDIA WITH EFFUSION: ICD-10-CM

## 2023-02-13 RX ORDER — OLMESARTAN MEDOXOMIL 5 MG/1
5 TABLET ORAL DAILY
Qty: 90 TABLET | Refills: 0 | Status: SHIPPED | OUTPATIENT
Start: 2023-02-13

## 2023-02-13 RX ORDER — LORATADINE 10 MG/1
10 TABLET ORAL DAILY
Qty: 90 TABLET | Refills: 0 | Status: SHIPPED | OUTPATIENT
Start: 2023-02-13

## 2023-02-13 RX ORDER — OMEPRAZOLE 40 MG/1
40 CAPSULE, DELAYED RELEASE ORAL DAILY
Qty: 90 CAPSULE | Refills: 0 | Status: SHIPPED | OUTPATIENT
Start: 2023-02-13

## 2023-03-28 DIAGNOSIS — J30.1 ALLERGIC RHINITIS DUE TO POLLEN, UNSPECIFIED SEASONALITY: ICD-10-CM

## 2023-03-28 RX ORDER — FLUTICASONE PROPIONATE 50 MCG
1 SPRAY, SUSPENSION (ML) NASAL 2 TIMES DAILY
Qty: 16 ML | Refills: 2 | Status: SHIPPED | OUTPATIENT
Start: 2023-03-28

## 2023-03-28 NOTE — TELEPHONE ENCOUNTER
His ENT doctors says to spray his flonase twice a day once in the am and once at night want to know if you can updated it     Send to his pharmacy the one on file

## 2023-05-10 NOTE — PROGRESS NOTES
Assessment/Plan:  Benign essential hypertension  Blood pressure is well controlled today  He is going to continue on lisinopril  We are going to get CBC CMP today  Vascular headache  He has a history of vascular headaches for which he is taking Fiorinal in the past   His neurologist prefers that he discontinue Fiorinal   The patient requests referral to headache specialists which we will accommodate  Insomnia  Patient suffers from chronic insomnia for which he takes Ambien  We did refill this today  Mixed hyperlipidemia  Patient is a history of hyperlipidemia  We are going to get a lipid profile on discuss results with him when results are available  He agrees  Diagnoses and all orders for this visit:    Impaired fasting glucose  -     POCT hemoglobin A1c    Insomnia, unspecified type  -     zolpidem (AMBIEN) 10 mg tablet; Take 1 tablet (10 mg total) by mouth daily at bedtime    Benign essential hypertension    Vascular headache  -     Ambulatory referral to Neurology; Future    Mixed hyperlipidemia    Other orders  -     omeprazole (PriLOSEC) 40 MG capsule;           Subjective:   Chief Complaint   Patient presents with    med check     pt here for 6 mth med check  he had his fbw done today  pt needs a refill on his Avis Matthew which i renewed  last colon 12/16        Patient ID: Noel Garcia is a 48 y o  male  Neurologist told him he has clonus  Also told he cant take Fiorinal  Since medical marijuana, HA pattern down to 1-2 several months  BP was elevated at neurology yesterday, Dr Demetria Jensen  PM Dr Victor Hash  Dr Nadia Cochran for med marijuana which is doing a lot more for me  HPI  The patient is a 51-year-old male presents today for follow-up of multiple medical problems  He has been compliant with his lisinopril for hypertension and denies any cardiovascular pulmonary complaint  He has been compliant with Ambien for chronic insomnia related to his medical condition    This remains affective for him  He has had a much diminished headache pattern since he has been receiving medical marijuana but he still has occasional headache  His neurologist at [facility] requested that he discontinue Fiorinal   He would like a referral to a headache specialist   Emilia Lozano with some constipation related to his opioid therapy  He also has some urinary hesitancy though this has improved  The following portions of the patient's history were reviewed and updated as appropriate: allergies, current medications, past family history, past medical history, past social history, past surgical history and problem list     Review of Systems   Constitution: Positive for weight loss  Negative for chills, decreased appetite and fever  Cardiovascular: Negative for chest pain, irregular heartbeat and leg swelling  Respiratory: Negative for cough, shortness of breath and wheezing  Musculoskeletal: Positive for back pain, muscle weakness and stiffness  Gastrointestinal: Positive for constipation  Negative for diarrhea  Improving with fruit/fiber   Genitourinary: Positive for hesitancy  Neurological: Positive for headaches and tremors  Psychiatric/Behavioral: Negative for depression  The patient has insomnia and is nervous/anxious  Objective:    Physical Exam   Constitutional: He is oriented to person, place, and time  He appears well-developed and well-nourished  Appear somewhat chronically ill   Cardiovascular: Normal rate and regular rhythm  Exam reveals no gallop  No murmur heard  Pulmonary/Chest: Effort normal and breath sounds normal  No respiratory distress  He has no wheezes  He has no rales  Musculoskeletal: He exhibits tenderness  He exhibits no edema  Tenderness of his mid lower thoracic and lumbar spine  No step-off or deformity  Neurological: He is alert and oriented to person, place, and time  He displays abnormal reflex  He exhibits abnormal muscle tone  Coordination abnormal    He ambulates with a cane  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

## 2023-05-16 DIAGNOSIS — I10 BENIGN ESSENTIAL HYPERTENSION: ICD-10-CM

## 2023-05-16 DIAGNOSIS — F51.01 PRIMARY INSOMNIA: ICD-10-CM

## 2023-05-16 DIAGNOSIS — K22.70 BARRETT'S ESOPHAGUS WITHOUT DYSPLASIA: ICD-10-CM

## 2023-05-16 RX ORDER — OMEPRAZOLE 40 MG/1
40 CAPSULE, DELAYED RELEASE ORAL DAILY
Qty: 90 CAPSULE | Refills: 0 | Status: SHIPPED | OUTPATIENT
Start: 2023-05-16

## 2023-05-16 RX ORDER — OLMESARTAN MEDOXOMIL 5 MG/1
5 TABLET ORAL DAILY
Qty: 90 TABLET | Refills: 0 | Status: SHIPPED | OUTPATIENT
Start: 2023-05-16

## 2023-05-16 RX ORDER — TRAZODONE HYDROCHLORIDE 100 MG/1
TABLET ORAL
Qty: 90 TABLET | Refills: 1 | Status: SHIPPED | OUTPATIENT
Start: 2023-05-16

## 2023-06-28 ENCOUNTER — TELEPHONE (OUTPATIENT)
Dept: FAMILY MEDICINE CLINIC | Facility: CLINIC | Age: 58
End: 2023-06-28

## 2023-06-28 DIAGNOSIS — R05.9 COUGH, UNSPECIFIED TYPE: Primary | ICD-10-CM

## 2023-06-28 RX ORDER — BENZONATATE 200 MG/1
200 CAPSULE ORAL 3 TIMES DAILY PRN
Qty: 30 CAPSULE | Refills: 0 | Status: SHIPPED | OUTPATIENT
Start: 2023-06-28

## 2023-06-28 NOTE — TELEPHONE ENCOUNTER
Patient calling office requesting benzonatate (TESSALON) 200 MG capsule      original prescribe by Dr robert for cough as needed   Vince Courtney   Patient requesting rx  Patient advised OV may be require, not appointment available    benzonatate (TESSALON) 200 MG capsule   Last Rx on file from 03/2020 30 cap with 5 refills

## 2023-07-20 DIAGNOSIS — J30.1 ALLERGIC RHINITIS DUE TO POLLEN, UNSPECIFIED SEASONALITY: ICD-10-CM

## 2023-07-20 RX ORDER — FLUTICASONE PROPIONATE 50 MCG
SPRAY, SUSPENSION (ML) NASAL
Qty: 16 ML | Refills: 2 | Status: SHIPPED | OUTPATIENT
Start: 2023-07-20

## 2023-08-02 DIAGNOSIS — I10 BENIGN ESSENTIAL HYPERTENSION: ICD-10-CM

## 2023-08-02 DIAGNOSIS — K22.70 BARRETT'S ESOPHAGUS WITHOUT DYSPLASIA: ICD-10-CM

## 2023-08-02 RX ORDER — OMEPRAZOLE 40 MG/1
40 CAPSULE, DELAYED RELEASE ORAL DAILY
Qty: 90 CAPSULE | Refills: 0 | Status: SHIPPED | OUTPATIENT
Start: 2023-08-02

## 2023-08-02 RX ORDER — OLMESARTAN MEDOXOMIL 5 MG/1
5 TABLET ORAL DAILY
Qty: 90 TABLET | Refills: 0 | Status: SHIPPED | OUTPATIENT
Start: 2023-08-02

## 2023-08-14 DIAGNOSIS — J30.1 ALLERGIC RHINITIS DUE TO POLLEN, UNSPECIFIED SEASONALITY: ICD-10-CM

## 2023-08-14 RX ORDER — FLUTICASONE PROPIONATE 50 MCG
SPRAY, SUSPENSION (ML) NASAL
Qty: 48 ML | Refills: 1 | Status: SHIPPED | OUTPATIENT
Start: 2023-08-14

## 2023-08-28 DIAGNOSIS — J30.2 SEASONAL ALLERGIC RHINITIS, UNSPECIFIED TRIGGER: ICD-10-CM

## 2023-08-28 RX ORDER — MONTELUKAST SODIUM 10 MG/1
10 TABLET ORAL
Qty: 90 TABLET | Refills: 0 | Status: SHIPPED | OUTPATIENT
Start: 2023-08-28

## 2023-10-30 DIAGNOSIS — I10 BENIGN ESSENTIAL HYPERTENSION: ICD-10-CM

## 2023-10-30 DIAGNOSIS — K22.70 BARRETT'S ESOPHAGUS WITHOUT DYSPLASIA: ICD-10-CM

## 2023-10-30 RX ORDER — OMEPRAZOLE 40 MG/1
40 CAPSULE, DELAYED RELEASE ORAL DAILY
Qty: 90 CAPSULE | Refills: 0 | OUTPATIENT
Start: 2023-10-30

## 2023-10-30 RX ORDER — OMEPRAZOLE 40 MG/1
40 CAPSULE, DELAYED RELEASE ORAL DAILY
Qty: 90 CAPSULE | Refills: 0 | Status: SHIPPED | OUTPATIENT
Start: 2023-10-30

## 2023-10-30 RX ORDER — OLMESARTAN MEDOXOMIL 5 MG/1
5 TABLET ORAL DAILY
Qty: 90 TABLET | Refills: 0 | Status: SHIPPED | OUTPATIENT
Start: 2023-10-30

## 2023-10-30 RX ORDER — OLMESARTAN MEDOXOMIL 5 MG/1
5 TABLET ORAL DAILY
Qty: 90 TABLET | Refills: 0 | OUTPATIENT
Start: 2023-10-30

## 2023-10-30 NOTE — TELEPHONE ENCOUNTER
Olmesartan 5 mg  Omeprazole 40 mg    [facility] [phone]    Pt scheduled with Hoa on Thursday 11/2 for [facility]

## 2023-11-02 ENCOUNTER — OFFICE VISIT (OUTPATIENT)
Dept: FAMILY MEDICINE CLINIC | Facility: CLINIC | Age: 58
End: 2023-11-02
Payer: COMMERCIAL

## 2023-11-02 VITALS
HEART RATE: 63 BPM | OXYGEN SATURATION: 99 % | BODY MASS INDEX: 25.27 KG/M2 | DIASTOLIC BLOOD PRESSURE: 84 MMHG | WEIGHT: 207.5 LBS | TEMPERATURE: 96.7 F | SYSTOLIC BLOOD PRESSURE: 130 MMHG | HEIGHT: 76 IN

## 2023-11-02 DIAGNOSIS — Z79.899 MEDICAL MARIJUANA USE: ICD-10-CM

## 2023-11-02 DIAGNOSIS — Z12.5 SCREENING FOR PROSTATE CANCER: ICD-10-CM

## 2023-11-02 DIAGNOSIS — F33.9 DEPRESSION, RECURRENT (HCC): ICD-10-CM

## 2023-11-02 DIAGNOSIS — K59.03 DRUG-INDUCED CONSTIPATION: ICD-10-CM

## 2023-11-02 DIAGNOSIS — F11.20 UNCOMPLICATED OPIOID DEPENDENCE (HCC): ICD-10-CM

## 2023-11-02 DIAGNOSIS — Z13.29 SCREENING FOR THYROID DISORDER: ICD-10-CM

## 2023-11-02 DIAGNOSIS — Z13.220 SCREENING CHOLESTEROL LEVEL: ICD-10-CM

## 2023-11-02 DIAGNOSIS — Z87.891 HISTORY OF TOBACCO USE: ICD-10-CM

## 2023-11-02 DIAGNOSIS — Z13.0 SCREENING FOR DEFICIENCY ANEMIA: ICD-10-CM

## 2023-11-02 DIAGNOSIS — Z00.00 ANNUAL PHYSICAL EXAM: Primary | ICD-10-CM

## 2023-11-02 DIAGNOSIS — Z13.1 SCREENING FOR DIABETES MELLITUS: ICD-10-CM

## 2023-11-02 DIAGNOSIS — E78.2 MIXED HYPERLIPIDEMIA: ICD-10-CM

## 2023-11-02 PROCEDURE — 99396 PREV VISIT EST AGE 40-64: CPT | Performed by: NURSE PRACTITIONER
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
## 2023-11-12 NOTE — ASSESSMENT & PLAN NOTE
Recommended lung CT screen due to his history of tobacco use.   Had shared medical decision making conversation

## 2023-11-13 DIAGNOSIS — F51.01 PRIMARY INSOMNIA: ICD-10-CM

## 2023-11-13 RX ORDER — TRAZODONE HYDROCHLORIDE 100 MG/1
TABLET ORAL
Qty: 90 TABLET | Refills: 1 | Status: SHIPPED | OUTPATIENT
Start: 2023-11-13

## 2023-11-16 ENCOUNTER — TELEPHONE (OUTPATIENT)
Dept: FAMILY MEDICINE CLINIC | Facility: CLINIC | Age: 58
End: 2023-11-16

## 2023-11-16 DIAGNOSIS — E78.5 HYPERLIPIDEMIA, UNSPECIFIED HYPERLIPIDEMIA TYPE: Primary | ICD-10-CM

## 2023-11-16 DIAGNOSIS — R97.20 ELEVATED PSA: ICD-10-CM

## 2023-11-16 LAB
ALBUMIN SERPL-MCNC: 4.4 G/DL (ref 3.6–5.1)
ALBUMIN/GLOB SERPL: 1.8 (CALC) (ref 1–2.5)
ALP SERPL-CCNC: 115 U/L (ref 35–144)
ALT SERPL-CCNC: 63 U/L (ref 9–46)
AST SERPL-CCNC: 43 U/L (ref 10–35)
BASOPHILS # BLD AUTO: 40 CELLS/UL (ref 0–200)
BASOPHILS NFR BLD AUTO: 0.6 %
BILIRUB SERPL-MCNC: 0.5 MG/DL (ref 0.2–1.2)
BUN SERPL-MCNC: 23 MG/DL (ref 7–25)
BUN/CREAT SERPL: ABNORMAL (CALC) (ref 6–22)
CALCIUM SERPL-MCNC: 10 MG/DL (ref 8.6–10.3)
CHLORIDE SERPL-SCNC: 106 MMOL/L (ref 98–110)
CHOLEST SERPL-MCNC: 242 MG/DL
CHOLEST/HDLC SERPL: 4.7 (CALC)
CO2 SERPL-SCNC: 29 MMOL/L (ref 20–32)
CREAT SERPL-MCNC: 0.92 MG/DL (ref 0.7–1.3)
EOSINOPHIL # BLD AUTO: 99 CELLS/UL (ref 15–500)
EOSINOPHIL NFR BLD AUTO: 1.5 %
ERYTHROCYTE [DISTWIDTH] IN BLOOD BY AUTOMATED COUNT: 12.2 % (ref 11–15)
GFR/BSA.PRED SERPLBLD CYS-BASED-ARV: 96 ML/MIN/1.73M2
GLOBULIN SER CALC-MCNC: 2.5 G/DL (CALC) (ref 1.9–3.7)
GLUCOSE SERPL-MCNC: 107 MG/DL (ref 65–99)
HCT VFR BLD AUTO: 41.8 % (ref 38.5–50)
HDLC SERPL-MCNC: 51 MG/DL
HGB BLD-MCNC: 14.1 G/DL (ref 13.2–17.1)
LDLC SERPL CALC-MCNC: 159 MG/DL (CALC)
LYMPHOCYTES # BLD AUTO: 1650 CELLS/UL (ref 850–3900)
LYMPHOCYTES NFR BLD AUTO: 25 %
MCH RBC QN AUTO: 32 PG (ref 27–33)
MCHC RBC AUTO-ENTMCNC: 33.7 G/DL (ref 32–36)
MCV RBC AUTO: 95 FL (ref 80–100)
MONOCYTES # BLD AUTO: 620 CELLS/UL (ref 200–950)
MONOCYTES NFR BLD AUTO: 9.4 %
NEUTROPHILS # BLD AUTO: 4191 CELLS/UL (ref 1500–7800)
NEUTROPHILS NFR BLD AUTO: 63.5 %
NONHDLC SERPL-MCNC: 191 MG/DL (CALC)
PLATELET # BLD AUTO: 264 THOUSAND/UL (ref 140–400)
PMV BLD REES-ECKER: 9.8 FL (ref 7.5–12.5)
POTASSIUM SERPL-SCNC: 5.1 MMOL/L (ref 3.5–5.3)
PROT SERPL-MCNC: 6.9 G/DL (ref 6.1–8.1)
PSA SERPL-MCNC: 2.5 NG/ML
RBC # BLD AUTO: 4.4 MILLION/UL (ref 4.2–5.8)
SODIUM SERPL-SCNC: 141 MMOL/L (ref 135–146)
TRIGL SERPL-MCNC: 174 MG/DL
WBC # BLD AUTO: 6.6 THOUSAND/UL (ref 3.8–10.8)

## 2023-11-16 NOTE — TELEPHONE ENCOUNTER
----- Message from Vernon Corona, [facility] sent at 11/16/2023  2:17 PM EST -----  Bryn Anton, Your labs show worsening cholesterol levels compared to last year, glucose and liver enzymes are also elevated and your prostate cancer screening took a larger jump than normal. I would recommend following a mediterrnean diet, limiting carbs/sugars and alcohol. Repeat labs in 4 months to check cholesterol and PSA. New lab orders placed for fasting labs.

## 2023-11-16 NOTE — RESULT ENCOUNTER NOTE
Bryn Anton, Your labs show worsening cholesterol levels compared to last year, glucose and liver enzymes are also elevated and your prostate cancer screening took a larger jump than normal. I would recommend following a mediterrnean diet, limiting carbs/sugars and alcohol. Repeat labs in 4 months to check cholesterol and PSA. New lab orders placed for fasting labs.

## 2023-11-16 NOTE — TELEPHONE ENCOUNTER
Patient aware on results, saw mychart message from provider to them on [address], Your labs show worsening cholesterol levels compared to last year, glucose and liver enzymes are also elevated and your prostate cancer screening took a larger jump than normal. I would recommend following a mediterrnean diet, limiting carbs/sugars and alcohol. Repeat labs in 4 months to check cholesterol and PSA. New lab orders placed for fasting labs.    Written by ROBBY Onofre on 11/16/2023  2:17 PM EST  Seen by patient José Jessica on 11/16/2023  6:11 PM

## 2023-11-22 ENCOUNTER — HOSPITAL ENCOUNTER (OUTPATIENT)
Dept: RADIOLOGY | Facility: HOSPITAL | Age: 58
Discharge: HOME | End: 2023-11-22
Payer: COMMERCIAL

## 2023-11-22 DIAGNOSIS — Z87.891 HISTORY OF TOBACCO USE: ICD-10-CM

## 2023-11-22 PROCEDURE — 71271 CT THORAX LUNG CANCER SCR C-: CPT

## 2023-11-28 ENCOUNTER — TELEPHONE (OUTPATIENT)
Dept: FAMILY MEDICINE CLINIC | Facility: CLINIC | Age: 58
End: 2023-11-28

## 2023-11-28 DIAGNOSIS — J30.2 SEASONAL ALLERGIC RHINITIS, UNSPECIFIED TRIGGER: ICD-10-CM

## 2023-11-28 RX ORDER — MONTELUKAST SODIUM 10 MG/1
10 TABLET ORAL
Qty: 90 TABLET | Refills: 0 | Status: SHIPPED | OUTPATIENT
Start: 2023-11-28

## 2023-11-28 NOTE — TELEPHONE ENCOUNTER
----- Message from Anmol Gillette, [address] [facility] sent at 11/28/2023  8:25 AM EST -----  Bryn Anton, Your CT lung screen shows 2 nodules within right low with benign appearance. Continue yearly lung screening.

## 2023-11-28 NOTE — RESULT ENCOUNTER NOTE
Bryn Anton, Your CT lung screen shows 2 nodules within right low with benign appearance. Continue yearly lung screening.

## 2023-11-28 NOTE — TELEPHONE ENCOUNTER
Called Pt. Spoke to Pt, and Pt advised on results. Bryn Anton, Your CT lung screen shows 2 nodules within right low with benign appearance. Continue yearly lung screening.

## 2024-01-28 DIAGNOSIS — K22.70 BARRETT'S ESOPHAGUS WITHOUT DYSPLASIA: ICD-10-CM

## 2024-01-28 DIAGNOSIS — I10 BENIGN ESSENTIAL HYPERTENSION: ICD-10-CM

## 2024-01-29 RX ORDER — OLMESARTAN MEDOXOMIL 5 MG/1
5 TABLET ORAL DAILY
Qty: 90 TABLET | Refills: 0 | Status: SHIPPED | OUTPATIENT
Start: 2024-01-29

## 2024-01-29 RX ORDER — OMEPRAZOLE 40 MG/1
40 CAPSULE, DELAYED RELEASE ORAL DAILY
Qty: 90 CAPSULE | Refills: 0 | Status: SHIPPED | OUTPATIENT
Start: 2024-01-29

## 2024-02-16 DIAGNOSIS — J30.1 ALLERGIC RHINITIS DUE TO POLLEN, UNSPECIFIED SEASONALITY: ICD-10-CM

## 2024-02-16 RX ORDER — FLUTICASONE PROPIONATE 50 MCG
SPRAY, SUSPENSION (ML) NASAL
Qty: 48 ML | Refills: 1 | Status: SHIPPED | OUTPATIENT
Start: 2024-02-16

## 2024-03-01 DIAGNOSIS — J30.2 SEASONAL ALLERGIC RHINITIS, UNSPECIFIED TRIGGER: ICD-10-CM

## 2024-03-01 RX ORDER — MONTELUKAST SODIUM 10 MG/1
10 TABLET ORAL
Qty: 90 TABLET | Refills: 0 | Status: SHIPPED | OUTPATIENT
Start: 2024-03-01

## 2024-04-25 DIAGNOSIS — F51.01 PRIMARY INSOMNIA: ICD-10-CM

## 2024-04-25 DIAGNOSIS — K22.70 BARRETT'S ESOPHAGUS WITHOUT DYSPLASIA: ICD-10-CM

## 2024-04-25 DIAGNOSIS — J30.2 SEASONAL ALLERGIC RHINITIS, UNSPECIFIED TRIGGER: ICD-10-CM

## 2024-04-25 DIAGNOSIS — I10 BENIGN ESSENTIAL HYPERTENSION: ICD-10-CM

## 2024-04-25 NOTE — TELEPHONE ENCOUNTER
Medication:montelukast (SINGULAIR) 10 mg tablet     Dose/Frequency:   TAKE 1 TABLET BY MOUTH EVERYDAY AT BEDTIME        Quantity: 90    Pharmacy: [address]     Office:   [x] PCP/Provider - Dr. Taylor   [] Speciality/Provider -     Does the patient have enough for 3 days?   [x] Yes   [] No - Send as HP to POD      Medication: omeprazole (PriLOSEC) 40 MG capsule     Dose/Frequency:     TAKE 1 CAPSULE (40 MG TOTAL) BY MOUTH DAILY.       Quantity: 90    Pharmacy:  [address]     Office:   [x] PCP/Provider - Brandon   [] Speciality/Provider -     Does the patient have enough for 3 days?   [x] Yes   [] No - Send as HP to POD      Medication: olmesartan (BENICAR) 5 mg tablet     Dose/Frequency:     TAKE 1 TABLET (5 MG TOTAL) BY MOUTH DAILY.       Quantity: 90    Pharmacy: [address]     Office:   [x] PCP/Provider -  Brandon   [] Speciality/Provider -     Does the patient have enough for 3 days?   [x] Yes   [] No - Send as HP to POD      Medication: traZODone (DESYREL) 100 mg tablet     Dose/Frequency: TAKE 1 TABLET BY MOUTH EVERYDAY AT BEDTIME     Quantity: 90    Pharmacy: [address]     Office:   [x] PCP/Provider - Brandon  [] Speciality/Provider -     Does the patient have enough for 3 days?   [x] Yes   [] No - Send as HP to POD    Patient would like a permanent pharmacy change for all medications to [address]

## 2024-04-26 RX ORDER — MONTELUKAST SODIUM 10 MG/1
10 TABLET ORAL
Qty: 90 TABLET | Refills: 1 | Status: SHIPPED | OUTPATIENT
Start: 2024-04-26

## 2024-04-26 RX ORDER — TRAZODONE HYDROCHLORIDE 100 MG/1
100 TABLET ORAL
Qty: 90 TABLET | Refills: 1 | Status: SHIPPED | OUTPATIENT
Start: 2024-04-26

## 2024-04-26 RX ORDER — OLMESARTAN MEDOXOMIL 5 MG/1
5 TABLET ORAL DAILY
Qty: 90 TABLET | Refills: 1 | Status: SHIPPED | OUTPATIENT
Start: 2024-04-26

## 2024-04-26 RX ORDER — OMEPRAZOLE 40 MG/1
40 CAPSULE, DELAYED RELEASE ORAL DAILY
Qty: 90 CAPSULE | Refills: 1 | Status: SHIPPED | OUTPATIENT
Start: 2024-04-26

## 2024-06-03 ENCOUNTER — OFFICE VISIT (OUTPATIENT)
Dept: FAMILY MEDICINE CLINIC | Facility: CLINIC | Age: 59
End: 2024-06-03
Payer: COMMERCIAL

## 2024-06-03 VITALS
TEMPERATURE: 97.4 F | DIASTOLIC BLOOD PRESSURE: 88 MMHG | OXYGEN SATURATION: 96 % | HEIGHT: 75 IN | SYSTOLIC BLOOD PRESSURE: 136 MMHG | HEART RATE: 75 BPM | WEIGHT: 210 LBS | BODY MASS INDEX: 26.11 KG/M2

## 2024-06-03 DIAGNOSIS — I10 BENIGN ESSENTIAL HYPERTENSION: ICD-10-CM

## 2024-06-03 DIAGNOSIS — S69.92XA INJURY OF LEFT THUMB, INITIAL ENCOUNTER: Primary | ICD-10-CM

## 2024-06-03 DIAGNOSIS — F11.20 UNCOMPLICATED OPIOID DEPENDENCE (HCC): ICD-10-CM

## 2024-06-03 DIAGNOSIS — F33.9 DEPRESSION, RECURRENT (HCC): ICD-10-CM

## 2024-06-03 PROCEDURE — 99213 OFFICE O/P EST LOW 20 MIN: CPT | Performed by: FAMILY MEDICINE

## 2024-06-03 NOTE — PROGRESS NOTES
"Assessment/Plan:      1. Injury of left thumb, initial encounter  -     XR hand 2 vw left; Future; Expected date: 06/03/2024  2. Benign essential hypertension  Assessment & Plan:  Controlled continue current medication   3. Uncomplicated opioid dependence (HCC)  4. Depression, recurrent (HCC)        Subjective:  No chief complaint on file.       Patient ID: Desean Erickson is a 59 y.o. male.    Started with pain in left thumb 2 weeks. Initially hit with hammer. Was swollen and painful. The swelling improved but not completely resolved and now   Pt on chronic pain medications managed by [facility].         Review of Systems   Constitutional: Negative.  Negative for fatigue and fever.   HENT: Negative.     Eyes: Negative.    Respiratory: Negative.  Negative for cough.    Cardiovascular: Negative.    Gastrointestinal: Negative.    Endocrine: Negative.    Genitourinary: Negative.    Musculoskeletal:  Positive for arthralgias.        Left thumb pain   Skin: Negative.    Allergic/Immunologic: Negative.    Neurological: Negative.    Psychiatric/Behavioral: Negative.           The following portions of the patient's history were reviewed and updated as appropriate: allergies, current medications, past family history, past medical history, past social history, past surgical history and problem list.    Objective:  Vitals:    06/03/24 1248   BP: 136/88   Pulse: 75   Temp: (!) 97.4 °F (36.3 °C)   TempSrc: Tympanic   SpO2: 96%   Weight: 95.3 kg (210 lb)   Height: 6' 3\" (1.905 m)      Physical Exam  Vitals and nursing note reviewed.   Constitutional:       Appearance: He is well-developed.   HENT:      Head: Normocephalic and atraumatic.   Cardiovascular:      Rate and Rhythm: Normal rate and regular rhythm.      Heart sounds: Normal heart sounds.   Pulmonary:      Effort: Pulmonary effort is normal.      Breath sounds: Normal breath sounds.   Abdominal:      General: Bowel sounds are normal.      Palpations: Abdomen " is soft.   Musculoskeletal:         General: Swelling, tenderness and signs of injury present. No deformity.      Comments: Left mp and distal no obvious deformity, mild edema, no discoloration    Skin:     General: Skin is warm and dry.   Neurological:      Mental Status: He is alert and oriented to person, place, and time.   Psychiatric:         Behavior: Behavior normal.         Thought Content: Thought content normal.         Judgment: Judgment normal.       Depression Screening Follow-up Plan: Patient's depression screening was positive with a PHQ-2 score of . Their PHQ-9 score was 7. Patient assessed for underlying major depression. They have no active suicidal ideations. Brief counseling provided and recommend additional follow-up/re-evaluation next office visit. Patient's depressive symptoms likely due to other medical condition. Would recommend treatment of underlying condition. Will continue to monitor at next office visit.

## 2024-06-05 ENCOUNTER — HOSPITAL ENCOUNTER (OUTPATIENT)
Dept: RADIOLOGY | Facility: HOSPITAL | Age: 59
Discharge: HOME | End: 2024-06-05
Payer: COMMERCIAL

## 2024-06-05 DIAGNOSIS — S69.92XA INJURY OF LEFT THUMB, INITIAL ENCOUNTER: ICD-10-CM

## 2024-06-05 PROCEDURE — 73130 X-RAY EXAM OF HAND: CPT

## 2024-08-01 ENCOUNTER — OFFICE VISIT (OUTPATIENT)
Dept: FAMILY MEDICINE CLINIC | Facility: CLINIC | Age: 59
End: 2024-08-01
Payer: COMMERCIAL

## 2024-08-01 VITALS
TEMPERATURE: 98.1 F | DIASTOLIC BLOOD PRESSURE: 84 MMHG | SYSTOLIC BLOOD PRESSURE: 130 MMHG | WEIGHT: 205 LBS | OXYGEN SATURATION: 97 % | HEART RATE: 55 BPM | HEIGHT: 75 IN | BODY MASS INDEX: 25.49 KG/M2
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
## 2024-08-01 DIAGNOSIS — M25.362 KNEE INSTABILITY, LEFT: Primary | ICD-10-CM

## 2024-08-01 DIAGNOSIS — I10 BENIGN ESSENTIAL HYPERTENSION: ICD-10-CM

## 2024-08-01 PROCEDURE — 99213 OFFICE O/P EST LOW 20 MIN: CPT | Performed by: FAMILY MEDICINE

## 2024-08-01 NOTE — PROGRESS NOTES
Assessment/Plan:      1. Knee instability, left  Assessment & Plan:  Pt would benefit from MRI left knee. Likely cartilage damage- loose body or overlapping.  Consider ortho evaluation  Orders:  -     MRI knee left  wo contrast; Future; Expected date: 08/01/2024  2. Injury due to fall, initial encounter  -     MRI knee left  wo contrast; Future; Expected date: 08/01/2024  3. Benign essential hypertension  Assessment & Plan:  Controlled on current medication        Subjective:  Chief Complaint   Patient presents with    Knee Pain     Left knee pain. Stepped on a dog bone and slipped on it and came down on his knee. Occurred 1 month ago. Pain has been progressing ever since. Crunches when he bends it. Dull pain.        Patient ID: Desean Erickson is a 59 y.o. male.    Pt complains of Left knee pain after injury - 3-4 weeks ago. Slipped on dog bone and landed on left knee- twisting it to the side. Since then has not been able to put weight on it. Radiating pain up into hip and back pain bothering him more due to the way he is walking  Using a cane to take weight off.. the knee is making crunching and cracking noises since injury  Cannot straighten completely,   Used Ice and heat for the first week. Taking Hydrocodone and medical marijuana.with minimal relief  Knee locks up 5-6 times during the day, has to crack his knee to get knee to move.   Constant pain, some relief from laying down   If Laying knee out to side, it locks up     Ankle Swelling  Associated symptoms include arthralgias and joint swelling. Pertinent negatives include no coughing, fatigue or fever. The symptoms are aggravated by movement.       Review of Systems   Constitutional: Negative.  Negative for fatigue and fever.   HENT: Negative.     Eyes: Negative.    Respiratory: Negative.  Negative for cough.    Cardiovascular: Negative.    Gastrointestinal: Negative.    Endocrine: Negative.    Genitourinary: Negative.    Musculoskeletal:  Positive for  "arthralgias, back pain, gait problem and joint swelling.   Skin: Negative.    Allergic/Immunologic: Negative.    Psychiatric/Behavioral: Negative.           The following portions of the patient's history were reviewed and updated as appropriate: allergies, current medications, past family history, past medical history, past social history, past surgical history and problem list.    Objective:  Vitals:    08/01/24 1021   BP: 130/84   BP Location: Left arm   Patient Position: Sitting   Cuff Size: Standard   Pulse: 55   Temp: 98.1 °F (36.7 °C)   TempSrc: Tympanic   SpO2: 97%   Weight: 93 kg (205 lb)   Height: 6' 3\" (1.905 m)      Physical Exam  Vitals and nursing note reviewed.   Constitutional:       Appearance: He is well-developed.   HENT:      Head: Normocephalic and atraumatic.   Cardiovascular:      Rate and Rhythm: Normal rate and regular rhythm.      Heart sounds: Normal heart sounds.   Pulmonary:      Effort: Pulmonary effort is normal.      Breath sounds: Normal breath sounds.   Abdominal:      General: Bowel sounds are normal.      Palpations: Abdomen is soft.   Musculoskeletal:         General: Swelling, tenderness and signs of injury present.      Comments: Tenderness medial joint line left knee.   Ligaments intact, slight effusion just superior to patella.  Limited extension to 75 degrees.   crepitance     Skin:     General: Skin is warm and dry.   Neurological:      Mental Status: He is alert and oriented to person, place, and time.   Psychiatric:         Behavior: Behavior normal.         Thought Content: Thought content normal.         Judgment: Judgment normal.         Answers submitted by the patient for this visit:  Lower Extremity Injury Questionnaire (Submitted on 7/31/2024)  Chief Complaint: Lower extremity pain  Incident occurred: more than 1 week ago  Incident location: at home  Injury mechanism: a fall  Pain location: left knee  Pain quality: aching, stabbing  Pain - numeric: 6/10  Pain " course: fluctuating  tingling: No  inability to bear weight: Yes  loss of motion: Yes  loss of sensation: No  muscle weakness: No  Foreign body present: no foreign bodies  Depression Screening Follow-up Plan: Patient's depression screening was positive with a PHQ-2 score of . Their PHQ-9 score was 5. Patient assessed for underlying major depression. They have no active suicidal ideations. Brief counseling provided and recommend additional follow-up/re-evaluation next office visit.

## 2024-08-02 NOTE — ASSESSMENT & PLAN NOTE
Pt would benefit from MRI left knee. Likely cartilage damage- loose body or overlapping.  Consider ortho evaluation

## 2024-08-20 ENCOUNTER — OFFICE VISIT (OUTPATIENT)
Dept: FAMILY MEDICINE CLINIC | Facility: CLINIC | Age: 59
End: 2024-08-20
Payer: COMMERCIAL

## 2024-08-20 VITALS
DIASTOLIC BLOOD PRESSURE: 78 MMHG | OXYGEN SATURATION: 98 % | WEIGHT: 204 LBS | HEART RATE: 64 BPM | TEMPERATURE: 96.4 F | BODY MASS INDEX: 25.36 KG/M2 | SYSTOLIC BLOOD PRESSURE: 130 MMHG | HEIGHT: 75 IN

## 2024-08-20 DIAGNOSIS — K40.90 LEFT GROIN HERNIA: Primary | ICD-10-CM

## 2024-08-20 PROCEDURE — 99213 OFFICE O/P EST LOW 20 MIN: CPT

## 2024-08-20 RX ORDER — MULTIVIT WITH MINERALS/LUTEIN
1000 TABLET ORAL DAILY
COMMUNITY

## 2024-08-20 NOTE — PROGRESS NOTES
"Ambulatory Visit  Name: Desean Erickson      : 1965      MRN: [id number]  Encounter Provider: Joy Kiser DO  Encounter Date: 2024   Encounter department: [facility]    Assessment & Plan   1. Left groin hernia  Assessment & Plan:  -referral to general surgery  -u/s left inguina  Orders:  -     Ambulatory Referral to General Surgery; Future  -     US groin/inguinal area; Future; Expected date: 2024       History of Present Illness     Presents for acute visit. Had a hernia repair surgery in left groin in [id number] and mesh put in. Took a fall after missing a step going down stairs last week and felt something tear when he landed.  Since fall there is a bulge and the area is very painful it is not red. Takes hydrocodone for back pain which has been helping with pain in groin.     Fall  Pertinent negatives include no abdominal pain, fever, hematuria or vomiting.       Review of Systems   Constitutional:  Negative for chills and fever.   HENT:  Negative for ear pain and sore throat.    Eyes:  Negative for pain and visual disturbance.   Respiratory:  Negative for cough and shortness of breath.    Cardiovascular:  Negative for chest pain and palpitations.   Gastrointestinal:  Negative for abdominal pain and vomiting.   Genitourinary:  Positive for testicular pain. Negative for dysuria and hematuria.   Musculoskeletal:  Negative for arthralgias and back pain.   Skin:  Negative for color change and rash.   Neurological:  Negative for seizures and syncope.   All other systems reviewed and are negative.      Objective     /78 (BP Location: Right arm, Patient Position: Sitting, Cuff Size: Large)   Pulse 64   Temp (!) 96.4 °F (35.8 °C) (Tympanic)   Ht 6' 3\" (1.905 m)   Wt 92.5 kg (204 lb)   SpO2 98%   BMI 25.50 kg/m²     Physical Exam  Constitutional:       General: He is not in acute distress.     Appearance: Normal appearance. He is not ill-appearing or toxic-appearing. "   HENT:      Head: Normocephalic and atraumatic.      Right Ear: External ear normal.      Left Ear: External ear normal.      Nose: Nose normal.   Eyes:      Conjunctiva/sclera: Conjunctivae normal.   Cardiovascular:      Rate and Rhythm: Normal rate and regular rhythm.      Heart sounds: Normal heart sounds. No murmur heard.  Pulmonary:      Effort: No respiratory distress.      Breath sounds: Normal breath sounds. No wheezing, rhonchi or rales.   Abdominal:      Hernia: A hernia is present. Hernia is present in the left inguinal area (tender to palpation).   Skin:     General: Skin is warm and dry.   Neurological:      General: No focal deficit present.      Mental Status: He is alert and oriented to person, place, and time.   Psychiatric:         Mood and Affect: Mood normal.         Behavior: Behavior normal.       Administrative Statements

## 2024-08-21 ENCOUNTER — HOSPITAL ENCOUNTER (OUTPATIENT)
Dept: ULTRASOUND IMAGING | Facility: HOSPITAL | Age: 59
Discharge: HOME | End: 2024-08-21
Payer: COMMERCIAL

## 2024-08-21 DIAGNOSIS — K40.90 LEFT GROIN HERNIA: ICD-10-CM

## 2024-08-21 PROCEDURE — 76705 ECHO EXAM OF ABDOMEN: CPT

## 2024-08-25 ENCOUNTER — HOSPITAL ENCOUNTER (OUTPATIENT)
Dept: MRI IMAGING | Facility: HOSPITAL | Age: 59
Discharge: HOME | End: 2024-08-25
Payer: COMMERCIAL
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
## 2024-08-25 DIAGNOSIS — M25.362 KNEE INSTABILITY, LEFT: ICD-10-CM

## 2024-08-25 PROCEDURE — 73721 MRI JNT OF LWR EXTRE W/O DYE: CPT

## 2024-08-26 ENCOUNTER — CONSULT (OUTPATIENT)
Dept: SURGERY | Facility: HOSPITAL | Age: 59
End: 2024-08-26
Payer: COMMERCIAL

## 2024-08-26 ENCOUNTER — TELEPHONE (OUTPATIENT)
Age: 59
End: 2024-08-26

## 2024-08-26 VITALS
HEIGHT: 75 IN | OXYGEN SATURATION: 96 % | RESPIRATION RATE: 18 BRPM | HEART RATE: 62 BPM | WEIGHT: 202.8 LBS | SYSTOLIC BLOOD PRESSURE: 159 MMHG | BODY MASS INDEX: 25.22 KG/M2 | DIASTOLIC BLOOD PRESSURE: 80 MMHG

## 2024-08-26 DIAGNOSIS — R10.32 LEFT GROIN PAIN: Primary | ICD-10-CM

## 2024-08-26 DIAGNOSIS — K40.90 LEFT GROIN HERNIA: ICD-10-CM

## 2024-08-26 PROCEDURE — 99203 OFFICE O/P NEW LOW 30 MIN: CPT | Performed by: SURGERY

## 2024-08-26 NOTE — TELEPHONE ENCOUNTER
Patient calling to check status of MRI result. If surgeon is needed he would like to be referred Dr. Anmol Payne. Please call to discuss. Thank you!
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
## 2024-08-28 DIAGNOSIS — M25.362 KNEE INSTABILITY, LEFT: ICD-10-CM

## 2024-08-28 NOTE — TELEPHONE ENCOUNTER
Read pt the message from Dr Taylor on MRI report. Pt would like to be referred to Orthopedics please

## 2024-09-03 ENCOUNTER — OFFICE VISIT (OUTPATIENT)
Dept: OBGYN CLINIC | Facility: CLINIC | Age: 59
End: 2024-09-03
Payer: COMMERCIAL

## 2024-09-03 ENCOUNTER — APPOINTMENT (OUTPATIENT)
Dept: RADIOLOGY | Facility: CLINIC | Age: 59
End: 2024-09-03
Payer: COMMERCIAL

## 2024-09-03 VITALS
BODY MASS INDEX: 25.12 KG/M2 | SYSTOLIC BLOOD PRESSURE: 148 MMHG | HEIGHT: 75 IN | WEIGHT: 202 LBS | DIASTOLIC BLOOD PRESSURE: 82 MMHG

## 2024-09-03 DIAGNOSIS — M25.362 KNEE INSTABILITY, LEFT: ICD-10-CM

## 2024-09-03 DIAGNOSIS — M25.562 ACUTE PAIN OF LEFT KNEE: ICD-10-CM
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
## 2024-09-03 DIAGNOSIS — S80.02XA CONTUSION OF LEFT KNEE, INITIAL ENCOUNTER: Primary | ICD-10-CM

## 2024-09-03 PROBLEM — M76.52 PATELLAR TENDINITIS, LEFT KNEE: Status: ACTIVE | Noted: 2024-09-03

## 2024-09-03 PROCEDURE — 99204 OFFICE O/P NEW MOD 45 MIN: CPT | Performed by: ORTHOPAEDIC SURGERY

## 2024-09-03 PROCEDURE — 3079F DIAST BP 80-89 MM HG: CPT | Performed by: ORTHOPAEDIC SURGERY

## 2024-09-03 PROCEDURE — 3077F SYST BP >= 140 MM HG: CPT | Performed by: ORTHOPAEDIC SURGERY

## 2024-09-03 PROCEDURE — 73564 X-RAY EXAM KNEE 4 OR MORE: CPT

## 2024-09-03 NOTE — ASSESSMENT & PLAN NOTE
Findings are consistent with left knee contusion with patellar tendinitis. Findings and treatment options discussed with patient.  Patient's left knee MRI, radiologist report, and x-rays were reviewed and discussed with him.  There appear to be no fracture or soft tissue injury with his MRI study.  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  Patient's knee has not recover from his recent injury with a knee contusion.  I provided the patient with a referral to physical therapy to work on range of motion. I provided him with a patellar tendon strap at today's visit. Patient should avoid high impact activities such as running, jumping, squatting. Patient should limit walking up and down stairs, hills and uneven surfaces. I recommend using a stationary bike, swimming or walking on even surfaces for exercise. Patient can use topical Voltaren gel or Aspercreme, Tylenol and Anti-inflammatories as needed. Follow up in 2 months. All patient's questions were answered to his satisfaction.  This note is created using dictation transcription.  It may contain typographical errors, grammatical errors, improperly dictated words, background noise and other errors.
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
## 2024-09-17 ENCOUNTER — EVALUATION (OUTPATIENT)
Dept: PHYSICAL THERAPY | Facility: CLINIC | Age: 59
End: 2024-09-17
Payer: COMMERCIAL

## 2024-09-17 DIAGNOSIS — Z91.81 HISTORY OF FALLING: ICD-10-CM

## 2024-09-17 DIAGNOSIS — S80.02XD CONTUSION OF LEFT KNEE, SUBSEQUENT ENCOUNTER: ICD-10-CM

## 2024-09-17 DIAGNOSIS — R26.2 DIFFICULTY WALKING: Primary | ICD-10-CM

## 2024-09-17 PROCEDURE — 97163 PT EVAL HIGH COMPLEX 45 MIN: CPT | Performed by: PHYSICAL THERAPIST

## 2024-09-17 PROCEDURE — 97110 THERAPEUTIC EXERCISES: CPT | Performed by: PHYSICAL THERAPIST
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
## 2024-09-25 ENCOUNTER — OFFICE VISIT (OUTPATIENT)
Dept: PHYSICAL THERAPY | Facility: CLINIC | Age: 59
End: 2024-09-25
Payer: COMMERCIAL

## 2024-09-25 DIAGNOSIS — R26.2 DIFFICULTY WALKING: ICD-10-CM

## 2024-09-25 DIAGNOSIS — Z91.81 HISTORY OF FALLING: ICD-10-CM

## 2024-09-25 DIAGNOSIS — S80.02XD CONTUSION OF LEFT KNEE, SUBSEQUENT ENCOUNTER: Primary | ICD-10-CM

## 2024-09-25 PROCEDURE — 97112 NEUROMUSCULAR REEDUCATION: CPT

## 2024-09-25 PROCEDURE — 97110 THERAPEUTIC EXERCISES: CPT

## 2024-09-25 NOTE — PROGRESS NOTES
"Daily Note     Today's date: 2024  Patient name: Desean Erickson  : 1965  MRN: [id number]  Referring provider: Oscar Jiménez MD  Dx:   Encounter Diagnosis     ICD-10-CM    1. Contusion of left knee, subsequent encounter  S80.02XD       2. Difficulty walking  R26.2       3. History of falling  Z91.81                      Subjective: L knee feels better, low back is bothersome. He is compliant with HEP.     Difficulty standing straight from LBP and possible complications from a hernia surgery.       Objective: See treatment diary below      Assessment: Tolerated treatment fair, with c/o of low back and groin pain. Good quad activation with sets, fair eccentric control with LAQ/SAQ. Poor glute engagement for sets in supine. Poor posture with standing standing static balance. Continued PT would be beneficial to improve function.        Plan: Continue per plan of care.       Precautions: FALL RISK, Chronic pain, Severe LBP, PMH lumbar and cervical fusion, LE tremors related to cervical fusion, depression, HTN   Elevated HOB for TE      Manuals            NONE                                                    Neuro Re-Ed             QS 10x 10x5\"           LAQ 10x 2x10           SAQ  2x10            Supine marching  2x8           STS from elevated height             Glute set in HL  trailed           Feet Together Balance  easy           Semi-tandem  2x20 sec ea min UE           PNE Brief            Ther Ex             Heel Slides  10x 2x10 with strap and SB                        Seated Heel Slides                                       Bike?                                       Ther Activity                                       Gait Training             Increased knee flexion L ATS            Suggestion using cane in appropriate hand, deferred              Modalities                                            "

## 2024-09-26 ENCOUNTER — CONSULT (OUTPATIENT)
Dept: PAIN MEDICINE | Facility: CLINIC | Age: 59
End: 2024-09-26
Payer: COMMERCIAL

## 2024-09-26 VITALS
HEART RATE: 72 BPM | HEIGHT: 75 IN | SYSTOLIC BLOOD PRESSURE: 163 MMHG | WEIGHT: 207 LBS | DIASTOLIC BLOOD PRESSURE: 92 MMHG | BODY MASS INDEX: 25.74 KG/M2

## 2024-09-26 DIAGNOSIS — R10.32 LEFT GROIN PAIN: ICD-10-CM

## 2024-09-26 DIAGNOSIS — G57.92 ILIOINGUINAL NEURALGIA OF LEFT SIDE: Primary | ICD-10-CM

## 2024-09-26 PROCEDURE — 99204 OFFICE O/P NEW MOD 45 MIN: CPT | Performed by: ANESTHESIOLOGY
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
## 2024-10-03 ENCOUNTER — OFFICE VISIT (OUTPATIENT)
Dept: PHYSICAL THERAPY | Facility: CLINIC | Age: 59
End: 2024-10-03
Payer: COMMERCIAL

## 2024-10-03 DIAGNOSIS — R26.2 DIFFICULTY WALKING: ICD-10-CM

## 2024-10-03 DIAGNOSIS — S80.02XD CONTUSION OF LEFT KNEE, SUBSEQUENT ENCOUNTER: Primary | ICD-10-CM

## 2024-10-03 DIAGNOSIS — Z91.81 HISTORY OF FALLING: ICD-10-CM

## 2024-10-03 PROCEDURE — 97112 NEUROMUSCULAR REEDUCATION: CPT

## 2024-10-03 PROCEDURE — 97110 THERAPEUTIC EXERCISES: CPT

## 2024-10-03 NOTE — PROGRESS NOTES
"Daily Note     Today's date: 10/3/2024  Patient name: Desean Erickson  : 1965  MRN: [id number]  Referring provider: Oscar Jiménez MD  Dx:   Encounter Diagnosis     ICD-10-CM    1. Contusion of left knee, subsequent encounter  S80.02XD       2. Difficulty walking  R26.2       3. History of falling  Z91.81             Start Time: 1500  Stop Time: 1545  Total time in clinic (min): 45 minutes    Subjective: Desean reports \"my knee is feeling better, I think it's from doing some of those exercises\". Notes he did take increase pain medication prior to PT.       Objective: See treatment diary below      Assessment: Kvng tolerated PT treatment fair, exercise tolerance is limited at this time d/t c/o low back and knee pain. Attempted RB for functional warm up, pt declined. Limited AROM present with SAQ/LAQ. Was able to add standing HR to diary. Intermittent FTA required with 1/2 tandem balance, cues for erect posture. Pt would benefit from continued PT to further address impairments and maximize functional level.        Plan: Continue per plan of care.       Precautions: FALL RISK, Chronic pain, Severe LBP, PMH lumbar and cervical fusion, LE tremors related to cervical fusion, depression, HTN   Elevated HOB for TE      Manuals 9/17 9/25 10/3          NONE                                                    Neuro Re-Ed             QS 10x 10x5\" :05 20x          LAQ 10x 2x10 2x10           SAQ  2x10  2x10          Supine marching  2x8 x10          STS from elevated height             HR   2x10          Glute set in HL  trailed           Feet Together Balance  easy           Semi-tandem  2x20 sec ea min UE :30x2           PNE Brief            Ther Ex             Heel Slides  10x 2x10 with strap and SB 2x10                        Seated Heel Slides                                       Bike?   Pt declined                                     Ther Activity                                       Gait Training           "   Increased knee flexion L ATS            Suggestion using cane in appropriate hand, deferred              Modalities

## 2024-10-10 ENCOUNTER — APPOINTMENT (OUTPATIENT)
Dept: PHYSICAL THERAPY | Facility: CLINIC | Age: 59
End: 2024-10-10
Payer: COMMERCIAL

## 2024-10-17 ENCOUNTER — OFFICE VISIT (OUTPATIENT)
Dept: PHYSICAL THERAPY | Facility: CLINIC | Age: 59
End: 2024-10-17
Payer: COMMERCIAL

## 2024-10-17 DIAGNOSIS — S80.02XD CONTUSION OF LEFT KNEE, SUBSEQUENT ENCOUNTER: Primary | ICD-10-CM

## 2024-10-17 DIAGNOSIS — R26.2 DIFFICULTY WALKING: ICD-10-CM

## 2024-10-17 DIAGNOSIS — Z91.81 HISTORY OF FALLING: ICD-10-CM

## 2024-10-17 PROCEDURE — 97110 THERAPEUTIC EXERCISES: CPT | Performed by: PHYSICAL THERAPIST

## 2024-10-17 PROCEDURE — 97112 NEUROMUSCULAR REEDUCATION: CPT | Performed by: PHYSICAL THERAPIST

## 2024-10-17 NOTE — PROGRESS NOTES
"Daily Note     Today's date: 10/17/2024  Patient name: Desean Erickson  : 1965  MRN: [id number]  Referring provider: Oscar Jiménez MD  Dx:   Encounter Diagnosis     ICD-10-CM    1. Contusion of left knee, subsequent encounter  S80.02XD       2. Difficulty walking  R26.2       3. History of falling  Z91.81                      Subjective: Desean feels near complete relief of knee pain.       Objective: See treatment diary below      Assessment: Tolerated treatment well. Patient demonstrated fatigue post treatment.  Near complete elimination of knee pain allowing for discharge to [facility] today.       Plan: Continued follow up only as needed     Precautions: FALL RISK, Chronic pain, Severe LBP, PMH lumbar and cervical fusion, LE tremors related to cervical fusion, depression, HTN   Elevated HOB for TE      Manuals 9/17 9/25 10/3 10/17         NONE                                                    Neuro Re-Ed             QS 10x 10x5\" :05 20x :05 20x         LAQ 10x 2x10 2x10  2x10         SAQ  2x10  2x10 2x10         Supine marching  2x8 x10 x10         STS from elevated height             HR   2x10 2x10         Glute set in HL  trailed           Feet Together Balance  easy           Semi-tandem  2x20 sec ea min UE :30x2  :30x2         PNE Brief            Ther Ex             Heel Slides  10x 2x10 with strap and SB 2x10  2x10                      Seated Heel Slides                                       Bike?   Pt declined                                     Ther Activity                                       Gait Training             Increased knee flexion L ATS            Suggestion using cane in appropriate hand, deferred              Modalities                                              "

## 2024-10-22 ENCOUNTER — TELEPHONE (OUTPATIENT)
Dept: GASTROENTEROLOGY | Facility: CLINIC | Age: 59
End: 2024-10-22

## 2024-10-28 DIAGNOSIS — F51.01 PRIMARY INSOMNIA: ICD-10-CM

## 2024-10-28 DIAGNOSIS — K22.70 BARRETT'S ESOPHAGUS WITHOUT DYSPLASIA: ICD-10-CM

## 2024-10-28 DIAGNOSIS — I10 BENIGN ESSENTIAL HYPERTENSION: ICD-10-CM

## 2024-10-29 RX ORDER — OLMESARTAN MEDOXOMIL 5 MG/1
5 TABLET ORAL DAILY
Qty: 90 TABLET | Refills: 1 | Status: SHIPPED | OUTPATIENT
Start: 2024-10-29

## 2024-10-29 RX ORDER — TRAZODONE HYDROCHLORIDE 100 MG/1
100 TABLET ORAL
Qty: 90 TABLET | Refills: 1 | Status: SHIPPED | OUTPATIENT
Start: 2024-10-29

## 2024-10-29 RX ORDER — OMEPRAZOLE 40 MG/1
40 CAPSULE, DELAYED RELEASE ORAL DAILY
Qty: 90 CAPSULE | Refills: 1 | Status: SHIPPED | OUTPATIENT
Start: 2024-10-29

## 2024-11-11 ENCOUNTER — OFFICE VISIT (OUTPATIENT)
Dept: FAMILY MEDICINE CLINIC | Facility: CLINIC | Age: 59
End: 2024-11-11
Payer: COMMERCIAL

## 2024-11-11 VITALS
HEART RATE: 72 BPM | WEIGHT: 210 LBS | HEIGHT: 75 IN | BODY MASS INDEX: 26.11 KG/M2 | OXYGEN SATURATION: 96 % | TEMPERATURE: 97.5 F | SYSTOLIC BLOOD PRESSURE: 132 MMHG | DIASTOLIC BLOOD PRESSURE: 78 MMHG
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
## 2024-11-11 DIAGNOSIS — S70.361A TICK BITE OF RIGHT THIGH, INITIAL ENCOUNTER: Primary | ICD-10-CM

## 2024-11-11 DIAGNOSIS — I10 BENIGN ESSENTIAL HYPERTENSION: ICD-10-CM

## 2024-11-11 DIAGNOSIS — E78.2 MIXED HYPERLIPIDEMIA: ICD-10-CM

## 2024-11-11 PROCEDURE — 99213 OFFICE O/P EST LOW 20 MIN: CPT

## 2024-11-11 RX ORDER — DOXYCYCLINE HYCLATE 100 MG
100 TABLET ORAL 2 TIMES DAILY
Qty: 20 TABLET | Refills: 0 | Status: SHIPPED | OUTPATIENT
Start: 2024-11-11 | End: 2024-11-21

## 2024-11-11 NOTE — PROGRESS NOTES
Ambulatory Visit  Name: Desean Erickson      : 1965      MRN: [id number]  Encounter Provider: Joy Kiser DO  Encounter Date: 2024   Encounter department: [facility]    Assessment & Plan  Tick bite of right thigh, initial encounter  -doxycycline 100mg BID x10 days  -will check lyme antibody titers     Orders:    doxycycline hyclate (VIBRA-TABS) 100 mg tablet; Take 1 tablet (100 mg total) by mouth 2 (two) times a day for 10 days    Lyme Total AB W Reflex to IGM/IGG; Future    Lyme Total AB W Reflex to IGM/IGG    Benign essential hypertension    Orders:    CBC and Platelet; Future    PSA Total (Reflex To Free); Future    TSH, 3rd generation with Free T4 reflex; Future    CBC and Platelet    PSA Total (Reflex To Free)    TSH, 3rd generation with Free T4 reflex    Mixed hyperlipidemia    Orders:    Lipid panel; Future    Comprehensive metabolic panel; Future    Lipid panel    Comprehensive metabolic panel       History of Present Illness     Pt presents for acute visit. Noticed a tick on his right inner thigh, just below the inguinal area today and was able to completely remove it. Is not sure how long it was there for, but states there is now an erythematous ring surrounding the area of the bite.     Denies fever, chills, myalgias, chest pain, or shortness of breath.           Review of Systems   Constitutional:  Negative for chills and fever.   HENT:  Negative for ear pain and sore throat.    Eyes:  Negative for pain and visual disturbance.   Respiratory:  Negative for cough and shortness of breath.    Cardiovascular:  Negative for chest pain and palpitations.   Gastrointestinal:  Negative for abdominal pain and vomiting.   Genitourinary:  Negative for dysuria and hematuria.   Musculoskeletal:  Negative for arthralgias and back pain.   Skin:  Positive for wound. Negative for color change and rash.   Neurological:  Negative for seizures and syncope.   All other systems reviewed and  "are negative.          Objective     /78 (BP Location: Left arm, Patient Position: Sitting, Cuff Size: Adult)   Pulse 72   Temp 97.5 °F (36.4 °C) (Tympanic)   Ht 6' 3\" (1.905 m)   Wt 95.3 kg (210 lb)   SpO2 96%   BMI 26.25 kg/m²     Physical Exam  Constitutional:       General: He is not in acute distress.     Appearance: Normal appearance. He is not ill-appearing or toxic-appearing.   HENT:      Head: Normocephalic and atraumatic.      Right Ear: External ear normal.      Left Ear: External ear normal.      Nose: Nose normal.   Eyes:      Conjunctiva/sclera: Conjunctivae normal.   Cardiovascular:      Rate and Rhythm: Normal rate and regular rhythm.      Heart sounds: Normal heart sounds. No murmur heard.  Pulmonary:      Effort: No respiratory distress.      Breath sounds: Normal breath sounds. No wheezing, rhonchi or rales.   Skin:     General: Skin is warm and dry.   Neurological:      General: No focal deficit present.      Mental Status: He is alert and oriented to person, place, and time.   Psychiatric:         Mood and Affect: Mood normal.         Behavior: Behavior normal.         "

## 2024-11-11 NOTE — ASSESSMENT & PLAN NOTE
Orders:    CBC and Platelet; Future    PSA Total (Reflex To Free); Future    TSH, 3rd generation with Free T4 reflex; Future    CBC and Platelet    PSA Total (Reflex To Free)    TSH, 3rd generation with Free T4 reflex

## 2024-11-14 ENCOUNTER — PROCEDURE VISIT (OUTPATIENT)
Dept: PAIN MEDICINE | Facility: CLINIC | Age: 59
End: 2024-11-14
Payer: COMMERCIAL

## 2024-11-14 VITALS — HEIGHT: 75 IN | BODY MASS INDEX: 25.61 KG/M2 | WEIGHT: 206 LBS

## 2024-11-14 DIAGNOSIS — G57.92 ILIOINGUINAL NEURALGIA OF LEFT SIDE: Primary | ICD-10-CM

## 2024-11-14 PROCEDURE — 64425 NJX AA&/STRD II IH NERVES: CPT | Performed by: ANESTHESIOLOGY

## 2024-11-14 PROCEDURE — 76942 ECHO GUIDE FOR BIOPSY: CPT | Performed by: ANESTHESIOLOGY

## 2024-11-14 RX ORDER — METHYLPREDNISOLONE ACETATE 40 MG/ML
40 INJECTION, SUSPENSION INTRA-ARTICULAR; INTRALESIONAL; INTRAMUSCULAR; SOFT TISSUE ONCE
Status: COMPLETED | OUTPATIENT
Start: 2024-11-14 | End: 2024-11-14

## 2024-11-14 RX ORDER — ROPIVACAINE HYDROCHLORIDE 2 MG/ML
40 INJECTION, SOLUTION EPIDURAL; INFILTRATION; PERINEURAL ONCE
Status: COMPLETED | OUTPATIENT
Start: 2024-11-14 | End: 2024-11-14

## 2024-11-14 RX ADMIN — ROPIVACAINE HYDROCHLORIDE 40 MG: 2 INJECTION, SOLUTION EPIDURAL; INFILTRATION; PERINEURAL at 13:38

## 2024-11-14 RX ADMIN — METHYLPREDNISOLONE ACETATE 40 MG: 40 INJECTION, SUSPENSION INTRA-ARTICULAR; INTRALESIONAL; INTRAMUSCULAR; SOFT TISSUE at 13:38

## 2024-11-14 NOTE — PROGRESS NOTES
"Nerve block    Date/Time: 11/14/2024 1:38 PM    Performed by: Jerry Silverman DO  Authorized by: Jerry Silverman DO    Patient location:  [facility] Protocol:  procedure performed by consultantConsent: Verbal consent obtained. Written consent obtained.  Risks and benefits: risks, benefits and alternatives were discussed  Consent given by: patient  Time out: Immediately prior to procedure a \"time out\" was called to verify the correct patient, procedure, equipment, support staff and site/side marked as required.  Timeout called at: 11/14/2024 1:58 PM.  Patient understanding: patient states understanding of the procedure being performed  Patient consent: the patient's understanding of the procedure matches consent given  Procedure consent: procedure consent matches procedure scheduled  Site marked: the operative site was marked  Required items: required blood products, implants, devices, and special equipment available  Patient identity confirmed: verbally with patient    Indications:     Indications:  Pain relief  Location:     Body area:  Trunk    Trunk nerve: ilioinguinal      Nerve type:  Peripheral    Laterality:  Left  Pre-procedure details:     Skin preparation:  2% chlorhexidine    Preparation: Patient was prepped and draped in usual sterile fashion    Skin anesthesia (see MAR for exact dosages):     Skin anesthesia method:  None  Procedure details (see MAR for exact dosages):     Block needle gauge:  25 G    Guidance: ultrasound      Indication: Left groin pain  Preoperative diganosis: Left ilioinguinal iliohypogastric neuralgia  Postoperative diagnosis: Left ilioinguinal iliohypogastric neuralgia  Procedure: Ultrasound guided left ilioinguinal iliohypogastric nerve block    After discussing the risks, benefits, and alternatives to the procedure, the patient expressed understanding and wished to proceed. The patient was brought to the procedure suite and placed in the supine position. A procedural pause " was conducted to verify: Correct patient identity, procedure to be performed and as applicable, correct side and site, correct patient position, and availability of implants, special equipment or special requirements. A simple surgical tray was used. Prior to the procedure, the left abdominal wall was examined with a 12 MHz linear transducer to visualize external oblique, internal oblique, and transversus abdominis muscles just medial to the anterior superior iliac spine. Following this, the abdominal wall was prepared with a ChloraPrep scrub, then reexamined using the same transducer, a sterile ultrasound transducer cover, and sterile ultrasound transducer gel. Thereafter, using ultrasound guidance, a 2.5 inch 25-gauge needle was advanced into the into the fascial plane between the internal oblique and transversus abdominis. After visualization of the tip and negative aspiration for blood, a mixture of 40 mg of Depo-Medrol in 4 mL of 0.2% ropivacaine was injected into the plane between the internal oblique and transversus abdominis. The patient tolerated the procedure well and there were no apparent complications. After an appropriate amount of observation, the patient was dismissed from the recovery area under their own power.    The patient received a total steroid dose of 40 mg of Depo-Medrol.

## 2024-11-19 ENCOUNTER — TELEPHONE (OUTPATIENT)
Age: 59
End: 2024-11-19

## 2024-11-19 NOTE — TELEPHONE ENCOUNTER
Patient called states went to [facility] and was advised to have Lyme Disease testing until done with RX: Doxycycline. Pt states tick did come back positive for Lyme's . Inquiring if should wait or proceed to have testing done.

## 2024-11-20 LAB
ALBUMIN SERPL-MCNC: 4.5 G/DL (ref 3.6–5.1)
ALBUMIN/GLOB SERPL: 1.7 (CALC) (ref 1–2.5)
ALP SERPL-CCNC: 99 U/L (ref 35–144)
ALT SERPL-CCNC: 31 U/L (ref 9–46)
AST SERPL-CCNC: 17 U/L (ref 10–35)
BILIRUB SERPL-MCNC: 0.5 MG/DL (ref 0.2–1.2)
BUN SERPL-MCNC: 21 MG/DL (ref 7–25)
BUN/CREAT SERPL: ABNORMAL (CALC) (ref 6–22)
CALCIUM SERPL-MCNC: 10.2 MG/DL (ref 8.6–10.3)
CHLORIDE SERPL-SCNC: 102 MMOL/L (ref 98–110)
CHOLEST SERPL-MCNC: 298 MG/DL
CHOLEST/HDLC SERPL: 5.6 (CALC)
CO2 SERPL-SCNC: 30 MMOL/L (ref 20–32)
CREAT SERPL-MCNC: 0.87 MG/DL (ref 0.7–1.3)
ERYTHROCYTE [DISTWIDTH] IN BLOOD BY AUTOMATED COUNT: 12.5 % (ref 11–15)
GFR/BSA.PRED SERPLBLD CYS-BASED-ARV: 99 ML/MIN/1.73M2
GLOBULIN SER CALC-MCNC: 2.6 G/DL (CALC) (ref 1.9–3.7)
GLUCOSE SERPL-MCNC: 111 MG/DL (ref 65–99)
HCT VFR BLD AUTO: 44.8 % (ref 38.5–50)
HDLC SERPL-MCNC: 53 MG/DL
HGB BLD-MCNC: 14.6 G/DL (ref 13.2–17.1)
LDLC SERPL CALC-MCNC: 212 MG/DL (CALC)
MCH RBC QN AUTO: 31.1 PG (ref 27–33)
MCHC RBC AUTO-ENTMCNC: 32.6 G/DL (ref 32–36)
MCV RBC AUTO: 95.5 FL (ref 80–100)
NONHDLC SERPL-MCNC: 245 MG/DL (CALC)
PLATELET # BLD AUTO: 313 THOUSAND/UL (ref 140–400)
PMV BLD REES-ECKER: 9.2 FL (ref 7.5–12.5)
POTASSIUM SERPL-SCNC: 4.9 MMOL/L (ref 3.5–5.3)
PROT SERPL-MCNC: 7.1 G/DL (ref 6.1–8.1)
PSA SERPL-MCNC: 1 NG/ML
RBC # BLD AUTO: 4.69 MILLION/UL (ref 4.2–5.8)
SODIUM SERPL-SCNC: 140 MMOL/L (ref 135–146)
TRIGL SERPL-MCNC: 163 MG/DL
TSH SERPL-ACNC: 1.17 MIU/L (ref 0.4–4.5)
WBC # BLD AUTO: 7.4 THOUSAND/UL (ref 3.8–10.8)

## 2024-11-21 ENCOUNTER — RESULTS FOLLOW-UP (OUTPATIENT)
Dept: FAMILY MEDICINE CLINIC | Facility: CLINIC | Age: 59
End: 2024-11-21

## 2024-11-21 DIAGNOSIS — E78.5 HYPERLIPIDEMIA, UNSPECIFIED HYPERLIPIDEMIA TYPE: Primary | ICD-10-CM

## 2024-11-21 RX ORDER — ROSUVASTATIN CALCIUM 10 MG/1
10 TABLET, COATED ORAL DAILY
Qty: 30 TABLET | Refills: 1 | Status: SHIPPED | OUTPATIENT
Start: 2024-11-21

## 2024-11-21 NOTE — TELEPHONE ENCOUNTER
----- Message from Joy Kiser DO sent at 11/21/2024  4:58 PM EST -----  Hi Desean,   Your labwork including electrolytes, kidney function, liver enzymes, blood counts, prostate, and thyroid are all in normal range. Your fasting blood sugar is high as well as your cholesterol. Your cholesterol is significantly higher than last year and is putting you at a higher risk of having an event such as a stroke or heart attack in the next 10 years. If you are open to it, I would like to start you on a medication called a statin which will work to lower cholesterol. I will send a medication called crestor or rosuvastatin to your pharmacy. You can begin taking this once a day. Can also discuss further at your visit on 12/2. Please feel free to reach out to our office with any questions or concerns.   -

## 2024-11-27 ENCOUNTER — TELEPHONE (OUTPATIENT)
Dept: PAIN MEDICINE | Facility: CLINIC | Age: 59
End: 2024-11-27

## 2024-12-02 ENCOUNTER — OFFICE VISIT (OUTPATIENT)
Dept: FAMILY MEDICINE CLINIC | Facility: CLINIC | Age: 59
End: 2024-12-02
Payer: COMMERCIAL

## 2024-12-02 VITALS
HEIGHT: 75 IN | OXYGEN SATURATION: 97 % | WEIGHT: 210 LBS | BODY MASS INDEX: 26.11 KG/M2 | SYSTOLIC BLOOD PRESSURE: 124 MMHG | DIASTOLIC BLOOD PRESSURE: 70 MMHG | HEART RATE: 50 BPM | TEMPERATURE: 96 F

## 2024-12-02 DIAGNOSIS — J30.2 SEASONAL ALLERGIC RHINITIS, UNSPECIFIED TRIGGER: ICD-10-CM

## 2024-12-02 DIAGNOSIS — F33.9 DEPRESSION, RECURRENT (HCC): ICD-10-CM

## 2024-12-02 DIAGNOSIS — I10 BENIGN ESSENTIAL HYPERTENSION: ICD-10-CM

## 2024-12-02 DIAGNOSIS — R05.9 COUGH, UNSPECIFIED TYPE: ICD-10-CM

## 2024-12-02 DIAGNOSIS — Z12.2 SCREENING FOR LUNG CANCER: ICD-10-CM

## 2024-12-02 DIAGNOSIS — F51.01 PRIMARY INSOMNIA: ICD-10-CM

## 2024-12-02 DIAGNOSIS — Z00.00 WELL ADULT EXAM: Primary | ICD-10-CM

## 2024-12-02 DIAGNOSIS — K22.70 BARRETT'S ESOPHAGUS WITHOUT DYSPLASIA: ICD-10-CM

## 2024-12-02 DIAGNOSIS — E78.49 OTHER HYPERLIPIDEMIA: ICD-10-CM

## 2024-12-02 DIAGNOSIS — E78.2 MIXED HYPERLIPIDEMIA: ICD-10-CM

## 2024-12-02 PROCEDURE — 99396 PREV VISIT EST AGE 40-64: CPT | Performed by: FAMILY MEDICINE

## 2024-12-02 PROCEDURE — 99213 OFFICE O/P EST LOW 20 MIN: CPT | Performed by: FAMILY MEDICINE

## 2024-12-02 RX ORDER — AMOXICILLIN 500 MG
CAPSULE ORAL
COMMUNITY

## 2024-12-02 RX ORDER — MONTELUKAST SODIUM 10 MG/1
10 TABLET ORAL
Qty: 90 TABLET | Refills: 1 | Status: SHIPPED | OUTPATIENT
Start: 2024-12-02

## 2024-12-02 NOTE — PROGRESS NOTES
Adult Annual Physical  Name: Desean Erickson      : 1965      MRN: [id number]  Encounter Provider: Keerthi Taylor DO  Encounter Date: 2024   Encounter department: [facility]    Assessment & Plan  Well adult exam         Mixed hyperlipidemia  Pt would like to try dietary modification- fig newtons and eggs.   Repeat cholesterol in 3-6 months.  Discussed ct coronary calcium score- $99 pt will schedule          Other hyperlipidemia    Orders:    CT coronary calcium score; Future    Lipid Panel with Direct LDL reflex; Future    Benign essential hypertension  Controlled on olmesartan daily          Primary insomnia  Trazodone at bedtime as needed         Depression, recurrent (HCC)  Depression Screening Follow-up Plan: Patient's depression screening was positive with a PHQ-9 score of 7. Patient assessed for underlying major depression. They have no active suicidal ideations. Brief counseling provided and recommend additional follow-up/re-evaluation next office visit. Pt states he is affected by the season change. Continue trazodone 100mg daily          Screening for lung cancer  I discussed with him that he is a candidate for lung cancer CT screening.     The following Shared Decision-Making points were covered:  Benefits of screening were discussed, including the rates of reduction in death from lung cancer and other causes.  Harms of screening were reviewed, including false positive tests, radiation exposure levels, risks of invasive procedures, risks of complications of screening, and risk of overdiagnosis.  I counseled on the importance of adherence to annual lung cancer LDCT screening, impact of co-morbidities, and ability or willingness to undergo diagnosis and treatment.  I counseled on the importance of maintaining abstinence as a former smoker or was counseled on the importance of smoking cessation if a current smoker    Review of Eligibility Criteria: He meets all of the  criteria for Lung Cancer Screening.   He is 59 y.o.   He has 20 pack year tobacco history and is a current smoker or has quit within the past 15 years  He presents no signs or symptoms of lung cancer    After discussion, the patient decided to elect lung cancer screening.    Orders:    CT lung screening program; Future    Seasonal allergic rhinitis, unspecified trigger  Antihistamines are not helpful. Pt will try singulair daily   Orders:    montelukast (SINGULAIR) 10 mg tablet; Take 1 tablet (10 mg total) by mouth daily at bedtime    Cough, unspecified type         Mustafa's esophagus without dysplasia  Continue omeprazole and keep follow up with GI        Immunizations and preventive care screenings were discussed with patient today. Appropriate education was printed on patient's after visit summary.    Discussed risks and benefits of prostate cancer screening. We discussed the controversial history of PSA screening for prostate cancer in the [place] as well as the risk of over detection and over treatment of prostate cancer by way of PSA screening.  The patient understands that PSA blood testing is an imperfect way to screen for prostate cancer and that elevated PSA levels in the blood may also be caused by infection, inflammation, prostatic trauma or manipulation, urological procedures, or by benign prostatic enlargement.    The role of the digital rectal examination in prostate cancer screening was also discussed and I discussed with him that there is large interobserver variability in the findings of digital rectal examination.    Counseling:  Alcohol/drug use: discussed moderation in alcohol intake, the recommendations for healthy alcohol use, and avoidance of illicit drug use.  Dental Health: discussed importance of regular tooth brushing, flossing, and dental visits.  Exercise: the importance of regular exercise/physical activity was discussed. Recommend exercise 3-5 times per week for at least 30  minutes.       Depression Screening and Follow-up Plan: Patient's depression screening was positive with a PHQ-9 score of 7. Patient assessed for underlying major depression. Brief counseling provided and recommend additional follow-up/re-evaluation next office visit.         History of Present Illness     Adult Annual Physical:  Patient presents for annual physical. Pt is here for a physical today  States he is having trouble with increased urination at night  Pt had blood work done and his cholesterol is very high. Has been eating a sleeve of fig newtons at night and 2 hard boiled eggs daily. Denies any chest pain or shortness of breath. Does not want to be on statin but will start red rice yeast and fish oil.     Depression Screening:    - PHQ-9 Score: 7    Review of Systems   Constitutional: Negative.  Negative for fatigue and fever.   HENT: Negative.     Eyes: Negative.    Respiratory: Negative.  Negative for cough.    Cardiovascular: Negative.    Gastrointestinal: Negative.    Endocrine: Negative.    Genitourinary: Negative.    Musculoskeletal: Negative.    Skin: Negative.    Allergic/Immunologic: Negative.    Neurological: Negative.    Psychiatric/Behavioral: Negative.       Medical History Reviewed by provider this encounter:  Tobacco  Allergies  Meds  Problems  Med Hx  Surg Hx  Fam Hx     .  Current Outpatient Medications on File Prior to Visit   Medication Sig Dispense Refill    Ascorbic Acid (vitamin C) 1000 MG tablet Take 1,000 mg by mouth daily      CRANBERRY PO Take by mouth      cyclobenzaprine (FLEXERIL) 5 mg tablet Take 5 mg by mouth daily at bedtime PRN      HYDROcodone-acetaminophen (NORCO)  mg per tablet Take 1 tablet by mouth 4 (four) times a day as needed  0    loratadine (CLARITIN) 10 mg tablet Take 1 tablet (10 mg total) by mouth daily 90 tablet 0    meloxicam (MOBIC) 7.5 mg tablet Take 5 mg by mouth 2 (two) times a day PRN      Multiple Vitamins-Minerals (PRESERVISION AREDS PO)  "Take by mouth      olmesartan (BENICAR) 5 mg tablet take 1 tablet by mouth once daily 90 tablet 1    Omega-3 Fatty Acids (Fish Oil) 1200 MG CAPS Take by mouth      omeprazole (PriLOSEC) 40 MG capsule take 1 capsule by mouth once daily 90 capsule 1    RED YEAST RICE EXTRACT PO Take by mouth      rizatriptan (MAXALT-MLT) 5 mg disintegrating tablet Take 1-2 tabs at migraine onset, can repeat once in 2 hours. Max 4 tabs in 24 hours. Max 2-3 days a week 9 tablet 1    traZODone (DESYREL) 100 mg tablet take 1 tablet by mouth at bedtime 90 tablet 1     No current facility-administered medications on file prior to visit.      Social History     Tobacco Use    Smoking status: Former     Current packs/day: 0.00     Average packs/day: 2.0 packs/day for 30.7 years (61.3 ttl pk-yrs)     Types: Cigarettes     Start date: 1982     Quit date: 2016     Years since quittin.9    Smokeless tobacco: Never    Tobacco comments:     quit 5 yrs ago 1-2 ppd   Vaping Use    Vaping status: Never Used   Substance and Sexual Activity    Alcohol use: Not Currently     Alcohol/week: 11.0 standard drinks of alcohol     Types: 3 Glasses of wine, 8 Cans of beer per week     Comment: occasionally    Drug use: Yes     Frequency: 21.0 times per week     Types: Marijuana     Comment: Medical Marijuana as per Dr. Eid    Sexual activity: Not Currently     Partners: Female     Birth control/protection: Female Sterilization       Objective   /70 (BP Location: Left arm, Patient Position: Sitting, Cuff Size: Adult)   Pulse (!) 50   Temp (!) 96 °F (35.6 °C) (Tympanic)   Ht 6' 3\" (1.905 m)   Wt 95.3 kg (210 lb)   SpO2 97%   BMI 26.25 kg/m²     Physical Exam  Vitals and nursing note reviewed.   Constitutional:       Appearance: He is well-developed.   HENT:      Head: Normocephalic.      Right Ear: External ear normal.      Left Ear: External ear normal.      Nose: Nose normal.   Eyes:      Conjunctiva/sclera: Conjunctivae normal. "      Pupils: Pupils are equal, round, and reactive to light.   Cardiovascular:      Rate and Rhythm: Normal rate and regular rhythm.   Pulmonary:      Effort: Pulmonary effort is normal.      Breath sounds: Normal breath sounds.   Abdominal:      General: Bowel sounds are normal.      Palpations: Abdomen is soft.   Musculoskeletal:         General: Normal range of motion.      Cervical back: Normal range of motion and neck supple.   Skin:     General: Skin is warm and dry.   Neurological:      Mental Status: He is alert and oriented to person, place, and time.   Psychiatric:         Behavior: Behavior normal.         Thought Content: Thought content normal.         Judgment: Judgment normal.

## 2024-12-02 NOTE — PATIENT INSTRUCTIONS
"Recheck cholesterol in 3-6 months  Schedule ct lung cancer screen  Schedule ct coronary calcium score       Patient Education     Routine physical for adults   The Basics   Written by the doctors and editors at [facility]   What is a physical? -- A physical is a routine visit, or \"check-up,\" with your doctor. You might also hear it called a \"wellness visit\" or \"preventive visit.\"  During each visit, the doctor will:   Ask about your physical and mental health   Ask about your habits, behaviors, and lifestyle   Do an exam   Give you vaccines if needed   Talk to you about any medicines you take   Give advice about your health   Answer your questions  Getting regular check-ups is an important part of taking care of your health. It can help your doctor find and treat any problems you have. But it's also important for preventing health problems.  A routine physical is different from a \"sick visit.\" A sick visit is when you see a doctor because of a health concern or problem. Since physicals are scheduled ahead of time, you can think about what you want to ask the doctor.  How often should I get a physical? -- It depends on your age and health. In general, for people age 21 years and older:   If you are younger than 50 years, you might be able to get a physical every 3 years.   If you are 50 years or older, your doctor might recommend a physical every year.  If you have an ongoing health condition, like diabetes or high blood pressure, your doctor will probably want to see you more often.  What happens during a physical? -- In general, each visit will include:   Physical exam - The doctor or nurse will check your height, weight, heart rate, and blood pressure. They will also look at your eyes and ears. They will ask about how you are feeling and whether you have any symptoms that bother you.   Medicines - It's a good idea to bring a list of all the medicines you take to each doctor visit. Your doctor will talk to you about " "your medicines and answer any questions. Tell them if you are having any side effects that bother you. You should also tell them if you are having trouble paying for any of your medicines.   Habits and behaviors - This includes:   Your diet   Your exercise habits   Whether you smoke, drink alcohol, or use drugs   Whether you are sexually active   Whether you feel safe at home  Your doctor will talk to you about things you can do to improve your health and lower your risk of health problems. They will also offer help and support. For example, if you want to quit smoking, they can give you advice and might prescribe medicines. If you want to improve your diet or get more physical activity, they can help you with this, too.   Lab tests, if needed - The tests you get will depend on your age and situation. For example, your doctor might want to check your:   Cholesterol   Blood sugar   Iron level   Vaccines - The recommended vaccines will depend on your age, health, and what vaccines you already had. Vaccines are very important because they can prevent certain serious or deadly infections.   Discussion of screening - \"Screening\" means checking for diseases or other health problems before they cause symptoms. Your doctor can recommend screening based on your age, risk, and preferences. This might include tests to check for:   Cancer, such as breast, prostate, cervical, ovarian, colorectal, prostate, lung, or skin cancer   Sexually transmitted infections, such as chlamydia and gonorrhea   Mental health conditions like depression and anxiety  Your doctor will talk to you about the different types of screening tests. They can help you decide which screenings to have. They can also explain what the results might mean.   Answering questions - The physical is a good time to ask the doctor or nurse questions about your health. If needed, they can refer you to other doctors or specialists, too.  Adults older than 65 years often " need other care, too. As you get older, your doctor will talk to you about:   How to prevent falling at home   Hearing or vision tests   Memory testing   How to take your medicines safely   Making sure that you have the help and support you need at home  All topics are updated as new evidence becomes available and our peer review process is complete.  This topic retrieved from [employer] on: May 02, 2024.  Topic 756809 Version 1.0  Release: 32.4.3 - C32.122  © 2024 UpToDate, Inc. and/or its affiliates. All rights reserved.  Consumer Information Use and Disclaimer   Disclaimer: This generalized information is a limited summary of diagnosis, treatment, and/or medication information. It is not meant to be comprehensive and should be used as a tool to help the user understand and/or assess potential diagnostic and treatment options. It does NOT include all information about conditions, treatments, medications, side effects, or risks that may apply to a specific patient. It is not intended to be medical advice or a substitute for the medical advice, diagnosis, or treatment of a health care provider based on the health care provider's examination and assessment of a patient's specific and unique circumstances. Patients must speak with a health care provider for complete information about their health, medical questions, and treatment options, including any risks or benefits regarding use of medications. This information does not endorse any treatments or medications as safe, effective, or approved for treating a specific patient. UpToDate, Inc. and its affiliates disclaim any warranty or liability relating to this information or the use thereof.The use of this information is governed by the Terms of Use, available at https://www.wolterskluwer.com/en/know/clinical-effectiveness-terms. 2024© UpToDate, Inc. and its affiliates and/or licensors. All rights reserved.  Copyright   © 2024 UpToDate, Inc. and/or its affiliates. All  rights reserved.

## 2024-12-03 PROBLEM — J30.2 SEASONAL ALLERGIC RHINITIS: Status: ACTIVE | Noted: 2021-04-22

## 2024-12-03 PROBLEM — E78.49 OTHER HYPERLIPIDEMIA: Status: ACTIVE | Noted: 2024-12-03

## 2024-12-03 PROBLEM — Z12.2 SCREENING FOR LUNG CANCER: Status: ACTIVE | Noted: 2024-12-03

## 2024-12-03 NOTE — ASSESSMENT & PLAN NOTE
I discussed with him that he is a candidate for lung cancer CT screening.     The following Shared Decision-Making points were covered:  Benefits of screening were discussed, including the rates of reduction in death from lung cancer and other causes.  Harms of screening were reviewed, including false positive tests, radiation exposure levels, risks of invasive procedures, risks of complications of screening, and risk of overdiagnosis.  I counseled on the importance of adherence to annual lung cancer LDCT screening, impact of co-morbidities, and ability or willingness to undergo diagnosis and treatment.  I counseled on the importance of maintaining abstinence as a former smoker or was counseled on the importance of smoking cessation if a current smoker    Review of Eligibility Criteria: He meets all of the criteria for Lung Cancer Screening.   He is 59 y.o.   He has 20 pack year tobacco history and is a current smoker or has quit within the past 15 years  He presents no signs or symptoms of lung cancer    After discussion, the patient decided to elect lung cancer screening.    Orders:    CT lung screening program; Future

## 2024-12-03 NOTE — ASSESSMENT & PLAN NOTE
Depression Screening Follow-up Plan: Patient's depression screening was positive with a PHQ-9 score of 7. Patient assessed for underlying major depression. They have no active suicidal ideations. Brief counseling provided and recommend additional follow-up/re-evaluation next office visit. Pt states he is affected by the season change. Continue trazodone 100mg daily

## 2024-12-03 NOTE — ASSESSMENT & PLAN NOTE
Antihistamines are not helpful. Pt will try singulair daily   Orders:    montelukast (SINGULAIR) 10 mg tablet; Take 1 tablet (10 mg total) by mouth daily at bedtime

## 2024-12-03 NOTE — ASSESSMENT & PLAN NOTE
Pt would like to try dietary modification- fig newtons and eggs.   Repeat cholesterol in 3-6 months.  Discussed ct coronary calcium score- $99 pt will schedule

## 2024-12-09 ENCOUNTER — TELEPHONE (OUTPATIENT)
Age: 59
End: 2024-12-09

## 2024-12-09 NOTE — TELEPHONE ENCOUNTER
Patient call to confirm if it ok with doctor Brandon for the patient to schedule     CT CORONARY CALCIUM SCORE    Is on 12/21/24 and the Ct Lungs is on 1/12/25 if this is ok with doctor Brandon. Please l;et the patient know thank you.

## 2024-12-21 ENCOUNTER — HOSPITAL ENCOUNTER (OUTPATIENT)
Dept: CT IMAGING | Facility: HOSPITAL | Age: 59
Discharge: HOME | End: 2024-12-21
Payer: COMMERCIAL

## 2024-12-21 DIAGNOSIS — E78.49 OTHER HYPERLIPIDEMIA: ICD-10-CM

## 2024-12-21 PROCEDURE — 75571 CT HRT W/O DYE W/CA TEST: CPT

## 2025-01-02 PROBLEM — Z00.00 WELL ADULT EXAM: Status: RESOLVED | Noted: 2022-10-10 | Resolved: 2025-01-02

## 2025-01-02 PROBLEM — Z12.2 SCREENING FOR LUNG CANCER: Status: RESOLVED | Noted: 2024-12-03 | Resolved: 2025-01-02

## 2025-01-06 DIAGNOSIS — E78.5 DYSLIPIDEMIA: Primary | ICD-10-CM

## 2025-01-08 ENCOUNTER — OFFICE VISIT (OUTPATIENT)
Dept: GASTROENTEROLOGY | Facility: CLINIC | Age: 60
End: 2025-01-08
Payer: COMMERCIAL

## 2025-01-08 VITALS
TEMPERATURE: 98.8 F | WEIGHT: 205 LBS | HEIGHT: 75 IN | BODY MASS INDEX: 25.49 KG/M2 | DIASTOLIC BLOOD PRESSURE: 84 MMHG | SYSTOLIC BLOOD PRESSURE: 122 MMHG

## 2025-01-08 DIAGNOSIS — R13.10 DYSPHAGIA, UNSPECIFIED TYPE: ICD-10-CM

## 2025-01-08 DIAGNOSIS — Z86.0100 HISTORY OF COLON POLYPS: Primary | ICD-10-CM

## 2025-01-08 DIAGNOSIS — K22.70 BARRETT'S ESOPHAGUS WITHOUT DYSPLASIA: ICD-10-CM

## 2025-01-08 DIAGNOSIS — K21.9 GASTROESOPHAGEAL REFLUX DISEASE, UNSPECIFIED WHETHER ESOPHAGITIS PRESENT: ICD-10-CM

## 2025-01-08 PROCEDURE — 99214 OFFICE O/P EST MOD 30 MIN: CPT | Performed by: INTERNAL MEDICINE

## 2025-01-08 RX ORDER — SODIUM CHLORIDE, SODIUM LACTATE, POTASSIUM CHLORIDE, CALCIUM CHLORIDE 600; 310; 30; 20 MG/100ML; MG/100ML; MG/100ML; MG/100ML
125 INJECTION, SOLUTION INTRAVENOUS CONTINUOUS
OUTPATIENT
Start: 2025-01-08

## 2025-01-08 NOTE — ASSESSMENT & PLAN NOTE
- as noted on esophageal Bx as early as 12/07/16. Most recently 10/14/21 C0M2 BE sans dysplasia   - repeat EGD now   - c/w PPI  Orders:    EGD; Future

## 2025-01-08 NOTE — PATIENT INSTRUCTIONS
Scheduled date of EGD(as of today):01/21/2025  Physician performing EGD:SARAHI  Location of EGD:[place]   Instructions reviewed with patient by: NONE  Clearances: NONE     Follow up will be as needed

## 2025-01-08 NOTE — PROGRESS NOTES
Name: Desean Erickson      : 1965      MRN: [id number]  Encounter Provider: Rd Sanabria MD  Encounter Date: 2025   [facility]  :  Assessment & Plan  Mustafa's esophagus without dysplasia   - as noted on esophageal Bx as early as 16. Most recently 10/14/21 C0M2 BE sans dysplasia   - repeat EGD now   - c/w PPI  Orders:    EGD; Future    Dysphagia, unspecified type   - suspect oropharyngeal given nature of symptoms. 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 We can also obtain prox and distal esophageal Bx to r/o EoE       Gastroesophageal reflux disease, unspecified whether esophagitis present   - well-ctrled on PPI       History of colon polyps   - Hx of advanced polyp (TVA) noted on his  exam. His most recent colonoscopy in  had no adenomas. Recall late            History of Present Illness   HPI  Desean Erickson is a 59 y.o. male with a PMH of HTN, HLD, GERD c/b Mustafa's esophagus, radiculopathy, who presents for f/u.    Last OV 22 for GERD, C0M2 BE    10/14/21 EGD/colon:   - C0M2 BE, large HH, gastritis, normal duo   - multiple rectal HP, small EH. Repeat 5y    19 EGD/colon:    - C0M1 BE, 3cm HH.   - 1x ascending SSA, 1x cecal SSA, 1x trasnverse polyp (benign), 2x rectal HP, IH    16 EGD/colon:    - esophagitis, questionable BE (Bx pos for BE), GEJ ring, 5cm HH   - 2x ascending TAs, 1x hepatic flexure TA, 1x transverse TVA hot snared and clipped 2x, 3x rectal HP    16 EGD for dysphagia:   - distal esophagitis (Bx+ intestinal metaplasia), GEJ stricture ballooned from 12 to 18, 6cm HH, gastritis (Hpy Bx neg)    Omeprazole 40mg daily. Doing well. No reflux.    Occasionally foods get stuck going down: peanut butter and hard boiled eggs. He feels like sometimes liquids go down the wrong pipe and he coughs a lot. He feels the sensation in his lower neck, not  "in his chest.    Constipation improved after increasing fiber intake.    Review of Systems   Constitutional:  Negative for appetite change, chills, fatigue and fever.   HENT:  Positive for trouble swallowing.    Eyes:  Negative for photophobia.   Respiratory:  Negative for cough and shortness of breath.    Cardiovascular:  Negative for chest pain.   Gastrointestinal:  Negative for abdominal pain, constipation, diarrhea, nausea and vomiting.   Endocrine: Negative.    Genitourinary:  Negative for dysuria and frequency.   Musculoskeletal:  Negative for myalgias.   Skin:  Negative for pallor.   Neurological:  Negative for weakness.   Hematological: Negative.    Psychiatric/Behavioral: Negative.            Objective   /84 (BP Location: Left arm, Patient Position: Sitting, Cuff Size: Adult)   Temp 98.8 °F (37.1 °C) (Tympanic)   Ht 6' 3\" (1.905 m)   Wt 93 kg (205 lb)   BMI 25.62 kg/m²      Physical Exam      "

## 2025-01-12 ENCOUNTER — HOSPITAL ENCOUNTER (OUTPATIENT)
Dept: CT IMAGING | Facility: HOSPITAL | Age: 60
Discharge: HOME | End: 2025-01-12

## 2025-01-12 DIAGNOSIS — Z12.2 SCREENING FOR LUNG CANCER: ICD-10-CM

## 2025-01-20 ENCOUNTER — TELEPHONE (OUTPATIENT)
Dept: GASTROENTEROLOGY | Facility: HOSPITAL | Age: 60
End: 2025-01-20

## 2025-01-20 RX ORDER — SODIUM CHLORIDE, SODIUM LACTATE, POTASSIUM CHLORIDE, CALCIUM CHLORIDE 600; 310; 30; 20 MG/100ML; MG/100ML; MG/100ML; MG/100ML
125 INJECTION, SOLUTION INTRAVENOUS CONTINUOUS
Status: CANCELLED | OUTPATIENT
Start: 2025-01-20

## 2025-01-21 ENCOUNTER — ANESTHESIA EVENT (OUTPATIENT)
Dept: GASTROENTEROLOGY | Facility: HOSPITAL | Age: 60
End: 2025-01-21
Payer: COMMERCIAL

## 2025-01-21 ENCOUNTER — HOSPITAL ENCOUNTER (OUTPATIENT)
Dept: GASTROENTEROLOGY | Facility: HOSPITAL | Age: 60
Setting detail: OUTPATIENT SURGERY
Discharge: HOME | End: 2025-01-21
Attending: INTERNAL MEDICINE
Payer: COMMERCIAL

## 2025-01-21 ENCOUNTER — ANESTHESIA (OUTPATIENT)
Dept: GASTROENTEROLOGY | Facility: HOSPITAL | Age: 60
End: 2025-01-21
Payer: COMMERCIAL

## 2025-01-21 VITALS
HEIGHT: 75 IN | BODY MASS INDEX: 24.87 KG/M2 | DIASTOLIC BLOOD PRESSURE: 52 MMHG | TEMPERATURE: 97.4 F | HEART RATE: 69 BPM | WEIGHT: 200 LBS | OXYGEN SATURATION: 99 % | SYSTOLIC BLOOD PRESSURE: 126 MMHG | RESPIRATION RATE: 16 BRPM

## 2025-01-21 DIAGNOSIS — K22.70 BARRETT'S ESOPHAGUS WITHOUT DYSPLASIA: ICD-10-CM

## 2025-01-21 DIAGNOSIS — K44.9 HIATAL HERNIA: Primary | ICD-10-CM

## 2025-01-21 PROCEDURE — 88305 TISSUE EXAM BY PATHOLOGIST: CPT | Performed by: PATHOLOGY

## 2025-01-21 PROCEDURE — 43239 EGD BIOPSY SINGLE/MULTIPLE: CPT | Performed by: INTERNAL MEDICINE

## 2025-01-21 RX ORDER — LIDOCAINE HYDROCHLORIDE 10 MG/ML
INJECTION, SOLUTION EPIDURAL; INFILTRATION; INTRACAUDAL; PERINEURAL AS NEEDED
Status: DISCONTINUED | OUTPATIENT
Start: 2025-01-21 | End: 2025-01-21

## 2025-01-21 RX ORDER — SODIUM CHLORIDE 9 MG/ML
INJECTION, SOLUTION INTRAVENOUS CONTINUOUS PRN
Status: DISCONTINUED | OUTPATIENT
Start: 2025-01-21 | End: 2025-01-21

## 2025-01-21 RX ORDER — PROPOFOL 10 MG/ML
INJECTION, EMULSION INTRAVENOUS AS NEEDED
Status: DISCONTINUED | OUTPATIENT
Start: 2025-01-21 | End: 2025-01-21

## 2025-01-21 RX ADMIN — PROPOFOL 100 MG: 10 INJECTION, EMULSION INTRAVENOUS at 08:59

## 2025-01-21 RX ADMIN — SODIUM CHLORIDE: 0.9 INJECTION, SOLUTION INTRAVENOUS at 08:46

## 2025-01-21 RX ADMIN — PROPOFOL 50 MG: 10 INJECTION, EMULSION INTRAVENOUS at 08:56

## 2025-01-21 RX ADMIN — PROPOFOL 50 MG: 10 INJECTION, EMULSION INTRAVENOUS at 09:02

## 2025-01-21 RX ADMIN — PROPOFOL 200 MG: 10 INJECTION, EMULSION INTRAVENOUS at 08:51

## 2025-01-21 RX ADMIN — PROPOFOL 50 MG: 10 INJECTION, EMULSION INTRAVENOUS at 08:52

## 2025-01-21 RX ADMIN — PROPOFOL 100 MG: 10 INJECTION, EMULSION INTRAVENOUS at 08:54

## 2025-01-21 RX ADMIN — PROPOFOL 50 MG: 10 INJECTION, EMULSION INTRAVENOUS at 08:53

## 2025-01-21 RX ADMIN — LIDOCAINE HYDROCHLORIDE 50 MG: 10 INJECTION, SOLUTION EPIDURAL; INFILTRATION; INTRACAUDAL; PERINEURAL at 08:51

## 2025-01-21 NOTE — ANESTHESIA POSTPROCEDURE EVALUATION
Post-Op Assessment Note    CV Status:  Stable  Pain Score: 0    Pain management: adequate       Mental Status:  Alert and awake   Hydration Status:  Euvolemic   PONV Controlled:  Controlled   Airway Patency:  Patent     Post Op Vitals Reviewed: Yes    No anethesia notable event occurred.    Staff: Anesthesiologist, CRNA   Comments: Report given to recovering RN, VSS, Pt states he is comfortable      Last Filed PACU Vitals:  Vitals Value Taken Time   Temp     Pulse 88 01/21/25 0918   /70 01/21/25 0918   Resp 16 01/21/25 0918   SpO2 97 % 01/21/25 0918

## 2025-01-21 NOTE — ANESTHESIA PREPROCEDURE EVALUATION
Procedure:  EGD    Relevant Problems   ANESTHESIA (within normal limits)      CARDIO   (+) Benign essential hypertension   (+) Mixed hyperlipidemia   (+) Other hyperlipidemia   (+) Vascular headache      ENDO (within normal limits)      GI/HEPATIC  Confirmed NPO appropriate  Former EtOH  Hx of Hep C but dormant for years, did not require treatment   (+) Gastroesophageal reflux disease without esophagitis   (+) Hiatal hernia      /RENAL (within normal limits)      HEMATOLOGY (within normal limits)      MUSCULOSKELETAL   (+) Back pain   (+) Hiatal hernia   (+) Lumbar degenerative disc disease      NEURO/PSYCH   (+) Allergic headache   (+) Depression, recurrent (HCC)   (+) Dysthymia   (+) Lower extremity weakness   (+) Vascular headache      PULMONARY  Former smoker, quit 10 years ago   (-) Smoking   (-) URI (upper respiratory infection)      Behavioral Health   (+) Medical marijuana use   (+) Uncomplicated opioid dependence (HCC)        Physical Exam    Airway    Mallampati score: II  TM Distance: >3 FB  Neck ROM: full     Dental        Cardiovascular  Rhythm: regular, Rate: normal    Pulmonary   Breath sounds clear to auscultation    Other Findings        Anesthesia Plan  ASA Score- 3     Anesthesia Type- IV sedation with anesthesia with ASA Monitors.         Additional Monitors:     Airway Plan:     Comment: I discussed the risks and benefits of IV sedation anesthesia including the possibility of the need to convert to general anesthesia and the potential risk of awareness.  The patient was given the opportunity to ask questions, which were answered..       Plan Factors-Exercise tolerance (METS): >4 METS.    Chart reviewed.        Patient is not a current smoker.              Induction- intravenous.    Postoperative Plan-         Informed Consent- Anesthetic plan and risks discussed with patient.  I personally reviewed this patient with the CRNA. Discussed and agreed on the Anesthesia Plan with the CRNA..      NPO  Status:  Vitals Value Taken Time   Date of last liquid 01/20/25 01/21/25 0742   Time of last liquid 2230 01/21/25 0742   Date of last solid 01/20/25 01/21/25 0742   Time of last solid 2230 01/21/25 0742
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
## 2025-01-23 ENCOUNTER — RESULTS FOLLOW-UP (OUTPATIENT)
Age: 60
End: 2025-01-23

## 2025-01-23 ENCOUNTER — RESULTS FOLLOW-UP (OUTPATIENT)
Dept: FAMILY MEDICINE CLINIC | Facility: CLINIC | Age: 60
End: 2025-01-23

## 2025-02-11 NOTE — ASSESSMENT & PLAN NOTE
60yM w/ GERD, 4cm hiatal hernia and Mustafa's changes. Given the GERD with associated hiatal hernia and Mustafa's changes recommend a robotic-assisted hiatal hernia repair with partial fundoplication.     Will complete workup with manometry and an esophagram. I anticipate these to be normal so we will proceed with scheduling with review of this data prior to surgery.     Discussed the risks, benefits and alternatives of the surgical repair of a hiatal hernia repair with partial fundoplication. Risks include bleeding, infection, dysphagia, injury to the vagal nerves, injury to the esophagus or another viscera, and recurrence. We discussed the typical postoperative course. Most patients are discharged home the following day after surgery on a full liquid diet for a few days. After this a soft diet can be started using our Diet Sheet Recommendations. Consent was obtained in the office today for a EGD, robotic-assisted hiatal hernia reduction with partial fundoplication, possible Biologic mesh implantation, and possible gastroplasty.           Orders:  •  Esophageal manometry; Future  •  Case request operating room: ESOPHAGOGASTRODUODENOSCOPY (EGD), REPAIR HERNIA PARAESOPHAGEAL LAPAROSCOPIC W ROBOTICS; Standing

## 2025-02-11 NOTE — PROGRESS NOTES
Name: Desean Erickson      : 1965      MRN: [id number]  Encounter Provider: Petar Sloan DO  Encounter Date: 2025   [facility]  :  Assessment & Plan  Gastroesophageal reflux disease without esophagitis  60yM w/ GERD, 4cm hiatal hernia and Mustafa's changes. Given the GERD with associated hiatal hernia and Mustafa's changes recommend a robotic-assisted hiatal hernia repair with partial fundoplication.     Will complete workup with manometry and an esophagram. I anticipate these to be normal so we will proceed with scheduling with review of this data prior to surgery.     Discussed the risks, benefits and alternatives of the surgical repair of a hiatal hernia repair with partial fundoplication. Risks include bleeding, infection, dysphagia, injury to the vagal nerves, injury to the esophagus or another viscera, and recurrence. We discussed the typical postoperative course. Most patients are discharged home the following day after surgery on a full liquid diet for a few days. After this a soft diet can be started using our Diet Sheet Recommendations. Consent was obtained in the office today for a EGD, robotic-assisted hiatal hernia reduction with partial fundoplication, possible Biologic mesh implantation, and possible gastroplasty.           Orders:  •  Esophageal manometry; Future  •  Case request operating room: ESOPHAGOGASTRODUODENOSCOPY (EGD), REPAIR HERNIA PARAESOPHAGEAL LAPAROSCOPIC W ROBOTICS; Standing    Hiatal hernia    Orders:  •  Ambulatory Referral to Thoracic Surgery        Thoracic History   Diagnosis: GERD  Procedures/Surgeries: None  Pathology: Mustafa's  Adjuvant Therapy: N/a  Problem   Hiatal Hernia    recent food bolus  6 weeks ago     Gastroesophageal Reflux Disease Without Esophagitis        History of Present Illness   HPI  Desean Erickson is a 60 y.o. male presenting for consultation regarding GERD, a hiatal hernia  "and Umstafa's. Medical history includes left inguinal hernia surgery, GERD, history of esophageal stricture. History of lower back surgery fusion and cervical fusion. Former smoker quit in 2016.     He states he has been dealing with GERD since 1994. He has pain with swallowing. Admits to some dysphagia. He does admit to waking up in the middle of the night with burning behind the chest and regurgitation requiring Tums.  Admits to a chronic cigarettes.     Data:  The following results contain my personal interpretation and summarization.   EGD (1/21/25): 4cm hiatal hernia. Hill Grade III. Mustafa's esophagus.   CTC (1/12/25): No lung nodules.   Path (1/21/25): Mustafa's changes.     Meds: Omeprazole 40qd, Tums frequently    Review of Systems   Constitutional:  Negative for chills, fatigue and fever.   HENT:  Positive for trouble swallowing. Negative for voice change.    Eyes:  Negative for photophobia and visual disturbance.   Respiratory:  Positive for cough. Negative for shortness of breath.    Cardiovascular:  Negative for chest pain.   Gastrointestinal:  Positive for nausea and vomiting. Negative for abdominal pain.   Musculoskeletal:  Positive for back pain and gait problem.   Skin:  Negative for wound.   Neurological:  Negative for dizziness, light-headedness and headaches.           Objective   /74 (BP Location: Left arm, Patient Position: Sitting, Cuff Size: Large)   Pulse 67   Temp 98.6 °F (37 °C) (Temporal)   Resp 14   Ht 6' 3\" (1.905 m)   Wt 91.8 kg (202 lb 6.4 oz)   SpO2 93%   BMI 25.30 kg/m²     Pain Screening:  Pain Score:   3  ECOG    Physical Exam  Vitals reviewed.   Constitutional:       General: He is not in acute distress.     Appearance: Normal appearance. He is normal weight.   HENT:      Head: Normocephalic and atraumatic.   Cardiovascular:      Rate and Rhythm: Normal rate and regular rhythm.      Pulses: Normal pulses.      Heart sounds: Normal heart sounds.   Pulmonary:      " Effort: No respiratory distress.      Breath sounds: Normal breath sounds.   Abdominal:      General: Abdomen is flat. There is no distension.      Palpations: Abdomen is soft.      Comments: Lower midline scar   Musculoskeletal:      Cervical back: Normal range of motion.      Right lower leg: No edema.      Left lower leg: No edema.   Lymphadenopathy:      Cervical: No cervical adenopathy.   Neurological:      General: No focal deficit present.      Mental Status: He is alert and oriented to person, place, and time. Mental status is at baseline.   Psychiatric:         Mood and Affect: Mood normal.         Behavior: Behavior normal.         Thought Content: Thought content normal.         Judgment: Judgment normal.         Labs:       Pathology: I have reviewed pathology reports described above.

## 2025-02-11 NOTE — H&P (VIEW-ONLY)
Name: Desean Erickson      : 1965      MRN: [id number]  Encounter Provider: Petar Sloan DO  Encounter Date: 2025   [facility]  :  Assessment & Plan  Gastroesophageal reflux disease without esophagitis  60yM w/ GERD, 4cm hiatal hernia and Mustafa's changes. Given the GERD with associated hiatal hernia and Mustafa's changes recommend a robotic-assisted hiatal hernia repair with partial fundoplication.     Will complete workup with manometry and an esophagram. I anticipate these to be normal so we will proceed with scheduling with review of this data prior to surgery.     Discussed the risks, benefits and alternatives of the surgical repair of a hiatal hernia repair with partial fundoplication. Risks include bleeding, infection, dysphagia, injury to the vagal nerves, injury to the esophagus or another viscera, and recurrence. We discussed the typical postoperative course. Most patients are discharged home the following day after surgery on a full liquid diet for a few days. After this a soft diet can be started using our Diet Sheet Recommendations. Consent was obtained in the office today for a EGD, robotic-assisted hiatal hernia reduction with partial fundoplication, possible Biologic mesh implantation, and possible gastroplasty.           Orders:  •  Esophageal manometry; Future  •  Case request operating room: ESOPHAGOGASTRODUODENOSCOPY (EGD), REPAIR HERNIA PARAESOPHAGEAL LAPAROSCOPIC W ROBOTICS; Standing    Hiatal hernia    Orders:  •  Ambulatory Referral to Thoracic Surgery        Thoracic History   Diagnosis: GERD  Procedures/Surgeries: None  Pathology: Mustafa's  Adjuvant Therapy: N/a  Problem   Hiatal Hernia    recent food bolus  6 weeks ago     Gastroesophageal Reflux Disease Without Esophagitis        History of Present Illness   HPI  Desean Erickson is a 60 y.o. male presenting for consultation regarding GERD, a hiatal hernia  "and Mustafa's. Medical history includes left inguinal hernia surgery, GERD, history of esophageal stricture. History of lower back surgery fusion and cervical fusion. Former smoker quit in 2016.     He states he has been dealing with GERD since 1994. He has pain with swallowing. Admits to some dysphagia. He does admit to waking up in the middle of the night with burning behind the chest and regurgitation requiring Tums.  Admits to a chronic cigarettes.     Data:  The following results contain my personal interpretation and summarization.   EGD (1/21/25): 4cm hiatal hernia. Hill Grade III. Mustafa's esophagus.   CTC (1/12/25): No lung nodules.   Path (1/21/25): Mustafa's changes.     Meds: Omeprazole 40qd, Tums frequently    Review of Systems   Constitutional:  Negative for chills, fatigue and fever.   HENT:  Positive for trouble swallowing. Negative for voice change.    Eyes:  Negative for photophobia and visual disturbance.   Respiratory:  Positive for cough. Negative for shortness of breath.    Cardiovascular:  Negative for chest pain.   Gastrointestinal:  Positive for nausea and vomiting. Negative for abdominal pain.   Musculoskeletal:  Positive for back pain and gait problem.   Skin:  Negative for wound.   Neurological:  Negative for dizziness, light-headedness and headaches.           Objective   /74 (BP Location: Left arm, Patient Position: Sitting, Cuff Size: Large)   Pulse 67   Temp 98.6 °F (37 °C) (Temporal)   Resp 14   Ht 6' 3\" (1.905 m)   Wt 91.8 kg (202 lb 6.4 oz)   SpO2 93%   BMI 25.30 kg/m²     Pain Screening:  Pain Score:   3  ECOG    Physical Exam  Vitals reviewed.   Constitutional:       General: He is not in acute distress.     Appearance: Normal appearance. He is normal weight.   HENT:      Head: Normocephalic and atraumatic.   Cardiovascular:      Rate and Rhythm: Normal rate and regular rhythm.      Pulses: Normal pulses.      Heart sounds: Normal heart sounds.   Pulmonary:      " Effort: No respiratory distress.      Breath sounds: Normal breath sounds.   Abdominal:      General: Abdomen is flat. There is no distension.      Palpations: Abdomen is soft.      Comments: Lower midline scar   Musculoskeletal:      Cervical back: Normal range of motion.      Right lower leg: No edema.      Left lower leg: No edema.   Lymphadenopathy:      Cervical: No cervical adenopathy.   Neurological:      General: No focal deficit present.      Mental Status: He is alert and oriented to person, place, and time. Mental status is at baseline.   Psychiatric:         Mood and Affect: Mood normal.         Behavior: Behavior normal.         Thought Content: Thought content normal.         Judgment: Judgment normal.         Labs:       Pathology: I have reviewed pathology reports described above.

## 2025-02-12 ENCOUNTER — TELEPHONE (OUTPATIENT)
Dept: CARDIAC SURGERY | Facility: CLINIC | Age: 60
End: 2025-02-12

## 2025-02-12 ENCOUNTER — CONSULT (OUTPATIENT)
Dept: CARDIAC SURGERY | Facility: HOSPITAL | Age: 60
End: 2025-02-12
Payer: COMMERCIAL

## 2025-02-12 VITALS
BODY MASS INDEX: 25.17 KG/M2 | DIASTOLIC BLOOD PRESSURE: 74 MMHG | TEMPERATURE: 98.6 F | HEART RATE: 67 BPM | RESPIRATION RATE: 14 BRPM | SYSTOLIC BLOOD PRESSURE: 116 MMHG | WEIGHT: 202.4 LBS | OXYGEN SATURATION: 93 % | HEIGHT: 75 IN

## 2025-02-12 DIAGNOSIS — K44.9 HIATAL HERNIA: ICD-10-CM

## 2025-02-12 DIAGNOSIS — K21.9 GASTROESOPHAGEAL REFLUX DISEASE WITHOUT ESOPHAGITIS: Primary | Chronic | ICD-10-CM

## 2025-02-12 PROCEDURE — 99205 OFFICE O/P NEW HI 60 MIN: CPT | Performed by: SURGERY

## 2025-02-12 RX ORDER — CEFAZOLIN SODIUM 2 G/50ML
2000 SOLUTION INTRAVENOUS ONCE
OUTPATIENT
Start: 2025-02-12 | End: 2025-02-12

## 2025-02-12 NOTE — TELEPHONE ENCOUNTER
Left a detailed message for patient introducing myself and reason for the call. Awaiting on call back to schedule surgical procedure with Dr. Sloan.

## 2025-02-13 ENCOUNTER — TELEPHONE (OUTPATIENT)
Dept: CARDIAC SURGERY | Facility: CLINIC | Age: 60
End: 2025-02-13

## 2025-02-13 ENCOUNTER — PREP FOR PROCEDURE (OUTPATIENT)
Dept: CARDIAC SURGERY | Facility: CLINIC | Age: 60
End: 2025-02-13

## 2025-02-13 NOTE — TELEPHONE ENCOUNTER
Patient returned my call and we solidified surgery for Friday, 3/7/2025 with Dr. Sloan. Further discussed surgical process and also scheduled a post op date 3/26/2025 at 9am. Answered all of his questions and provided my direct phone number should he have any further questions leading up to his surgery.

## 2025-02-18 ENCOUNTER — HOSPITAL ENCOUNTER (OUTPATIENT)
Dept: GASTROENTEROLOGY | Facility: HOSPITAL | Age: 60
Discharge: HOME | End: 2025-02-18
Payer: COMMERCIAL

## 2025-02-18 VITALS
HEART RATE: 60 BPM | SYSTOLIC BLOOD PRESSURE: 149 MMHG | TEMPERATURE: 97.8 F | RESPIRATION RATE: 18 BRPM | DIASTOLIC BLOOD PRESSURE: 85 MMHG | OXYGEN SATURATION: 98 %

## 2025-02-18 DIAGNOSIS — K21.9 GASTROESOPHAGEAL REFLUX DISEASE WITHOUT ESOPHAGITIS: Chronic | ICD-10-CM

## 2025-02-18 PROCEDURE — 91010 ESOPHAGUS MOTILITY STUDY: CPT

## 2025-02-18 PROCEDURE — 91010 ESOPHAGUS MOTILITY STUDY: CPT | Performed by: INTERNAL MEDICINE

## 2025-02-18 NOTE — PERIOPERATIVE NURSING NOTE
Patient brought in the room and explained the esophageal manometry procedure. After the confirmation of allergies, lidocaine 2 % viscous given via nostrils and  a transnasal insertion of the High Resolution esophageal manometry catheter was inserted via left nostril. Patient given water to drink during the insertion and once the catheter inserted pressure bands of both Upper esophageal sphincter  (UES) and Lower esophageal sphincter ( LES) were observed on the color contour. Patient instructed to take a deep breath to verify placement of the catheter, diaphragmatic pinch noted on inspiration. Catheter was secured to right/left cheek. Patient was assisted to supine position .Patient was instructed to relax  while acclimating the catheter for about 5 minutes. A 30 second baseline resting pressure was obtained to identify the UES and LES followed by a series of 10 liquid swallows using 5 cc room temperature normal saline to assess esophageal motility and bolus transit. Patient administered 10 viscous swallows using 5 cc viscous solution, 1 multiple rapid drink swallow using 2 cc room temperature normal saline given a total of 5 drinks. Patient instructed to sit up at the edge of the stretcher and given 5 upright liquid swallows using 5 cc room temperature normal saline and 1 rapid drink challenge using 200 cc room temperature water. Manometry catheter withdrawn to 45 cm and 5 UES swallows  completed with 5 cc normal saline. At the end of the procedure the high resolution esophageal manometry catheter was removed from the nostril intact. Patient given discharge instructions and patient left in stable condition.
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
## 2025-02-19 ENCOUNTER — OFFICE VISIT (OUTPATIENT)
Dept: PAIN MEDICINE | Facility: CLINIC | Age: 60
End: 2025-02-19
Payer: COMMERCIAL

## 2025-02-19 VITALS — HEIGHT: 75 IN | WEIGHT: 202 LBS | BODY MASS INDEX: 25.12 KG/M2

## 2025-02-19 DIAGNOSIS — R10.32 LEFT GROIN PAIN: ICD-10-CM

## 2025-02-19 DIAGNOSIS — G57.92 ILIOINGUINAL NEURALGIA OF LEFT SIDE: Primary | ICD-10-CM

## 2025-02-19 PROCEDURE — 99214 OFFICE O/P EST MOD 30 MIN: CPT | Performed by: NURSE PRACTITIONER

## 2025-02-20 ENCOUNTER — TELEPHONE (OUTPATIENT)
Dept: PAIN MEDICINE | Facility: CLINIC | Age: 60
End: 2025-02-20

## 2025-02-20 ENCOUNTER — HOSPITAL ENCOUNTER (OUTPATIENT)
Dept: RADIOLOGY | Facility: HOSPITAL | Age: 60
End: 2025-02-20
Payer: COMMERCIAL

## 2025-02-20 DIAGNOSIS — K44.9 HIATAL HERNIA: ICD-10-CM

## 2025-02-20 PROCEDURE — 74220 X-RAY XM ESOPHAGUS 1CNTRST: CPT

## 2025-02-20 NOTE — TELEPHONE ENCOUNTER
Dr. Sloan is okay with patient having u/s guided ilioinguinal nerve block at any time relative to his surgery. Okay to offer him sooner than what's currently scheduled, if there's availability. Thank you.

## 2025-02-20 NOTE — TELEPHONE ENCOUNTER
Caller: PT    Doctor: Dr. Silverman    Reason for call: Pt calling back to see if sooner potential date still available. Please assist.     Call back#: [phone]

## 2025-02-20 NOTE — TELEPHONE ENCOUNTER
Caller: Hero    Doctor/Office: Dr Silverman    Call regarding :  rescheduling appt      Call was transferred to: [facility]

## 2025-02-26 ENCOUNTER — ANESTHESIA EVENT (OUTPATIENT)
Dept: PERIOP | Facility: HOSPITAL | Age: 60
End: 2025-02-26
Payer: COMMERCIAL

## 2025-02-26 RX ORDER — ASCORBIC ACID 100 MG
TABLET,CHEWABLE ORAL
COMMUNITY

## 2025-02-26 NOTE — PRE-PROCEDURE INSTRUCTIONS
Pre-Surgery Instructions:   Medication Instructions    Ascorbic Acid (Vitamin C) 100 MG CHEW Stop taking 7 days prior to surgery.    Ascorbic Acid (vitamin C) 1000 MG tablet Stop taking 7 days prior to surgery.    CRANBERRY PO Stop taking 7 days prior to surgery.    cyclobenzaprine (FLEXERIL) 5 mg tablet Uses PRN- OK to take day of surgery    HYDROcodone-acetaminophen (NORCO)  mg per tablet Uses PRN- OK to take day of surgery    loratadine (CLARITIN) 10 mg tablet Hold day of surgery.    meloxicam (MOBIC) 7.5 mg tablet Hold day of surgery.    montelukast (SINGULAIR) 10 mg tablet Stop taking 7 days prior to surgery.    olmesartan (BENICAR) 5 mg tablet Hold day of surgery.    Omega-3 Fatty Acids (Fish Oil) 1200 MG CAPS Stop taking 7 days prior to surgery.    omeprazole (PriLOSEC) 40 MG capsule Uses PRN- OK to take day of surgery    RED YEAST RICE EXTRACT PO Stop taking 7 days prior to surgery.    traZODone (DESYREL) 100 mg tablet Take night before surgery   Medication instructions for day of surgery reviewed. Please take all instructed medications with only a sip of water.       You will receive a call one business day prior to surgery with an arrival time and hospital directions. If your surgery is scheduled on a Monday, the hospital will be calling you on the Friday prior to your surgery. If you have not heard from anyone by 8pm, please call the hospital supervisor through the hospital  at [phone]. ([place] [phone] or [place] [phone]).    Do not eat or drink anything after midnight the night before your surgery, including candy, mints, lifesavers, or chewing gum. Do not drink alcohol 24hrs before your surgery. Try not to smoke at least 24hrs before your surgery.       Follow the pre surgery showering instructions as listed in the “My Surgical Experience Booklet” or otherwise provided by your surgeon's office. Do not use a blade to shave the surgical area 1 week before surgery. It is  okay to use a clean electric clippers up to 24 hours before surgery. Do not apply any lotions, creams, including makeup, cologne, deodorant, or perfumes after showering on the day of your surgery. Do not use dry shampoo, hair spray, hair gel, or any type of hair products.     No contact lenses, eye make-up, or artificial eyelashes. Remove nail polish, including gel polish, and any artificial, gel, or acrylic nails if possible. Remove all jewelry including rings and body piercing jewelry.     Wear causal clothing that is easy to take on and off. Consider your type of surgery.    Keep any valuables, jewelry, piercings at home. Please bring any specially ordered equipment (sling, braces) if indicated.    Arrange for a responsible person to drive you to and from the hospital on the day of your surgery. Please confirm the visitor policy for the day of your procedure when you receive your phone call with an arrival time.     Call the surgeon's office with any new illnesses, exposures, or additional questions prior to surgery.    Please reference your “My Surgical Experience Booklet” for additional information to prepare for your upcoming surgery.

## 2025-02-27 ENCOUNTER — PROCEDURE VISIT (OUTPATIENT)
Dept: PAIN MEDICINE | Facility: CLINIC | Age: 60
End: 2025-02-27
Payer: COMMERCIAL

## 2025-02-27 VITALS — WEIGHT: 202 LBS | HEIGHT: 75 IN | BODY MASS INDEX: 25.12 KG/M2

## 2025-02-27 DIAGNOSIS — G57.92 ILIOINGUINAL NEURALGIA OF LEFT SIDE: Primary | ICD-10-CM

## 2025-02-27 PROCEDURE — 76942 ECHO GUIDE FOR BIOPSY: CPT | Performed by: ANESTHESIOLOGY

## 2025-02-27 PROCEDURE — 64425 NJX AA&/STRD II IH NERVES: CPT | Performed by: ANESTHESIOLOGY

## 2025-02-27 RX ORDER — ROPIVACAINE HYDROCHLORIDE 2 MG/ML
40 INJECTION, SOLUTION EPIDURAL; INFILTRATION; PERINEURAL ONCE
Status: COMPLETED | OUTPATIENT
Start: 2025-02-27 | End: 2025-02-27

## 2025-02-27 RX ORDER — METHYLPREDNISOLONE ACETATE 40 MG/ML
40 INJECTION, SUSPENSION INTRA-ARTICULAR; INTRALESIONAL; INTRAMUSCULAR; SOFT TISSUE ONCE
Status: COMPLETED | OUTPATIENT
Start: 2025-02-27 | End: 2025-02-27

## 2025-02-27 RX ADMIN — ROPIVACAINE HYDROCHLORIDE 40 MG: 2 INJECTION, SOLUTION EPIDURAL; INFILTRATION; PERINEURAL at 14:08

## 2025-02-27 RX ADMIN — METHYLPREDNISOLONE ACETATE 40 MG: 40 INJECTION, SUSPENSION INTRA-ARTICULAR; INTRALESIONAL; INTRAMUSCULAR; SOFT TISSUE at 14:07

## 2025-02-27 NOTE — PROGRESS NOTES
"Nerve block    Date/Time: 2/27/2025 2:04 PM    Performed by: Jerry Silverman DO  Authorized by: Jerry Silverman DO    Patient location:  [facility] Protocol:  procedure performed by consultantConsent: Verbal consent obtained. Written consent obtained.  Risks and benefits: risks, benefits and alternatives were discussed  Consent given by: patient  Time out: Immediately prior to procedure a \"time out\" was called to verify the correct patient, procedure, equipment, support staff and site/side marked as required.  Timeout called at: 2/27/2025 2:04 PM.  Patient understanding: patient states understanding of the procedure being performed  Patient consent: the patient's understanding of the procedure matches consent given  Procedure consent: procedure consent matches procedure scheduled  Site marked: the operative site was marked  Required items: required blood products, implants, devices, and special equipment available  Patient identity confirmed: verbally with patient    Indications:     Indications:  Pain relief  Location:     Body area:  Trunk    Trunk nerve: ilioinguinal      Nerve type:  Peripheral    Laterality:  Left  Pre-procedure details:     Skin preparation:  2% chlorhexidine    Preparation: Patient was prepped and draped in usual sterile fashion    Procedure details (see MAR for exact dosages):     Block needle gauge:  25 G    Guidance: ultrasound        Indication: Left groin pain  Preoperative diganosis: Left ilioinguinal neuralgia  Postoperative diagnosis: Left ilioinguinal neuralgia  Procedure: Ultrasound guided left ilioinguinal nerve block    After discussing the risks, benefits, and alternatives to the procedure, the patient expressed understanding and wished to proceed. The patient was brought to the procedure suite and placed in the supine position. A procedural pause was conducted to verify: Correct patient identity, procedure to be performed and as applicable, correct side and site, correct " patient position, and availability of implants, special equipment or special requirements. A simple surgical tray was used. Prior to the procedure, the left abdominal wall was examined with a 12 MHz linear transducer to visualize external oblique, internal oblique, and transversus abdominis muscles just medial to the anterior superior iliac spine. Following this, the abdominal wall was prepared with a ChloraPrep scrub, then reexamined using the same transducer, a sterile ultrasound transducer cover, and sterile ultrasound transducer gel. Thereafter, using ultrasound guidance, a 2.5 inch 25-gauge needle was advanced into the into the fascial plane between the internal oblique and transversus abdominis. After visualization of the tip and negative aspiration for blood, a mixture of 40 mg of Depo-Medrol in 4 mL of 0.2% ropivacaine was injected into the plane between the internal oblique and transversus abdominis. The patient tolerated the procedure well and there were no apparent complications. After an appropriate amount of observation, the patient was dismissed from the recovery area under their own power.    The patient received a total steroid dose of 40 mg of Depo-Medrol.

## 2025-03-06 NOTE — ANESTHESIA PREPROCEDURE EVALUATION
Procedure:  ESOPHAGOGASTRODUODENOSCOPY (EGD) (Esophagus)  REPAIR HERNIA PARAESOPHAGEAL LAPAROSCOPIC W ROBOTICS (Abdomen)    #Small hiatal hernia with GERD  #S/p C6-7 anterior/posterior fusion    Relevant Problems   CARDIO   (+) Benign essential hypertension   (+) Mixed hyperlipidemia   (+) Other hyperlipidemia   (+) Vascular headache      GI/HEPATIC   (+) Gastroesophageal reflux disease without esophagitis   (+) Hiatal hernia      MUSCULOSKELETAL   (+) Back pain   (+) Hiatal hernia   (+) Lumbar degenerative disc disease      NEURO/PSYCH   (+) Allergic headache   (+) Depression, recurrent (HCC)   (+) Dysthymia   (+) Lower extremity weakness   (+) Vascular headache      Barium swallow study (02/2025):  FINDINGS:     Normal caliber of the esophagus. Esophageal motility is normal and emptying of contrast from the esophagus is prompt. No mucosal lesion, ulceration, or evidence of fold thickening.     Mild gastroesophageal reflux is observed.     Small sliding hiatal hernia, similar to the prior study.     Cervical fusion hardware noted.     IMPRESSION:     Small hiatal hernia with mild gastroesophageal reflux.    Physical Exam    Airway    Mallampati score: II  TM Distance: >3 FB  Neck ROM: full     Dental   No notable dental hx     Cardiovascular  Rhythm: regular, Rate: normal    Pulmonary   Breath sounds clear to auscultation    Other Findings  Intercisor Distance > 3cm          Anesthesia Plan  ASA Score- 2     Anesthesia Type- general with ASA Monitors.         Additional Monitors:     Airway Plan: ETT.    Comment: Discussed benefits/risks of general anesthesia including possibility of mouth/throat pain, injury to lips/teeth, nausea/vomiting, and surgical pain along with more rare complications such as stroke, MI, pneumonia, aspiration, and injury to blood vessels. All questions answered.  .       Plan Factors-Exercise tolerance (METS): >4 METS.    Chart reviewed. EKG reviewed.  Existing labs reviewed.                    Induction- intravenous and rapid sequence induction.    Postoperative Plan- Plan for postoperative opioid use. Planned trial extubation        Informed Consent- Anesthetic plan and risks discussed with patient.  I personally reviewed this patient with the CRNA. Discussed and agreed on the Anesthesia Plan with the CRNA..      NPO Status:  No vitals data found for the desired time range.

## 2025-03-07 ENCOUNTER — ANESTHESIA (OUTPATIENT)
Dept: PERIOP | Facility: HOSPITAL | Age: 60
End: 2025-03-07
Payer: COMMERCIAL

## 2025-03-07 ENCOUNTER — HOSPITAL ENCOUNTER (OUTPATIENT)
Facility: HOSPITAL | Age: 60
Setting detail: OUTPATIENT SURGERY
Discharge: HOME | End: 2025-03-08
Attending: SURGERY | Admitting: SURGERY
Payer: COMMERCIAL

## 2025-03-07 LAB
ANION GAP SERPL CALCULATED.3IONS-SCNC: 8 MMOL/L (ref 4–13)
ATRIAL RATE: 50 BPM
BUN SERPL-MCNC: 22 MG/DL (ref 5–25)
CALCIUM SERPL-MCNC: 8.9 MG/DL (ref 8.4–10.2)
CHLORIDE SERPL-SCNC: 105 MMOL/L (ref 96–108)
CO2 SERPL-SCNC: 25 MMOL/L (ref 21–32)
CREAT SERPL-MCNC: 0.8 MG/DL (ref 0.6–1.3)
ERYTHROCYTE [DISTWIDTH] IN BLOOD BY AUTOMATED COUNT: 12 % (ref 11.6–15.1)
GFR SERPL CREATININE-BSD FRML MDRD: 97 ML/MIN/1.73SQ M
GLUCOSE P FAST SERPL-MCNC: 138 MG/DL (ref 65–99)
GLUCOSE SERPL-MCNC: 138 MG/DL (ref 65–140)
HCT VFR BLD AUTO: 40.4 % (ref 36.5–49.3)
HGB BLD-MCNC: 13.7 G/DL (ref 12–17)
MCH RBC QN AUTO: 31.7 PG (ref 26.8–34.3)
MCHC RBC AUTO-ENTMCNC: 33.9 G/DL (ref 31.4–37.4)
MCV RBC AUTO: 94 FL (ref 82–98)
P AXIS: 66 DEGREES
PLATELET # BLD AUTO: 249 THOUSANDS/UL (ref 149–390)
PMV BLD AUTO: 8.5 FL (ref 8.9–12.7)
POTASSIUM SERPL-SCNC: 3.5 MMOL/L (ref 3.5–5.3)
PR INTERVAL: 174 MS
QRS AXIS: 68 DEGREES
QRSD INTERVAL: 88 MS
QT INTERVAL: 412 MS
QTC INTERVAL: 376 MS
RBC # BLD AUTO: 4.32 MILLION/UL (ref 3.88–5.62)
SODIUM SERPL-SCNC: 138 MMOL/L (ref 135–147)
T WAVE AXIS: 65 DEGREES
VENTRICULAR RATE: 50 BPM
WBC # BLD AUTO: 8.23 THOUSAND/UL (ref 4.31–10.16)

## 2025-03-07 PROCEDURE — 43280 LAPAROSCOPY FUNDOPLASTY: CPT

## 2025-03-07 PROCEDURE — 80048 BASIC METABOLIC PNL TOTAL CA: CPT | Performed by: STUDENT IN AN ORGANIZED HEALTH CARE EDUCATION/TRAINING PROGRAM

## 2025-03-07 PROCEDURE — 43280 LAPAROSCOPY FUNDOPLASTY: CPT | Performed by: SURGERY

## 2025-03-07 PROCEDURE — C1781 MESH (IMPLANTABLE): HCPCS | Performed by: SURGERY

## 2025-03-07 PROCEDURE — 85027 COMPLETE CBC AUTOMATED: CPT | Performed by: STUDENT IN AN ORGANIZED HEALTH CARE EDUCATION/TRAINING PROGRAM

## 2025-03-07 PROCEDURE — 93005 ELECTROCARDIOGRAM TRACING: CPT

## 2025-03-07 PROCEDURE — 93010 ELECTROCARDIOGRAM REPORT: CPT | Performed by: INTERNAL MEDICINE

## 2025-03-07 PROCEDURE — S2900 ROBOTIC SURGICAL SYSTEM: HCPCS | Performed by: SURGERY

## 2025-03-07 DEVICE — BIO-A TISSUE REINFORCEMENT 7CMX10CM
Type: IMPLANTABLE DEVICE | Site: ABDOMEN | Status: FUNCTIONAL
Brand: GORE BIO-A TISSUE REINFORCEMENT

## 2025-03-07 RX ORDER — SODIUM CHLORIDE 9 MG/ML
INJECTION, SOLUTION INTRAVENOUS CONTINUOUS PRN
Status: DISCONTINUED | OUTPATIENT
Start: 2025-03-07 | End: 2025-03-07

## 2025-03-07 RX ORDER — MONTELUKAST SODIUM 10 MG/1
10 TABLET ORAL
Status: DISCONTINUED | OUTPATIENT
Start: 2025-03-07 | End: 2025-03-08 | Stop reason: HOSPADM

## 2025-03-07 RX ORDER — LIDOCAINE HYDROCHLORIDE 20 MG/ML
INJECTION, SOLUTION EPIDURAL; INFILTRATION; INTRACAUDAL; PERINEURAL AS NEEDED
Status: DISCONTINUED | OUTPATIENT
Start: 2025-03-07 | End: 2025-03-07

## 2025-03-07 RX ORDER — HYDROCODONE BITARTRATE AND ACETAMINOPHEN 5; 325 MG/1; MG/1
2 TABLET ORAL EVERY 6 HOURS PRN
Refills: 0 | Status: DISCONTINUED | OUTPATIENT
Start: 2025-03-07 | End: 2025-03-08

## 2025-03-07 RX ORDER — SENNOSIDES 8.6 MG
1 TABLET ORAL DAILY
Status: DISCONTINUED | OUTPATIENT
Start: 2025-03-07 | End: 2025-03-08 | Stop reason: HOSPADM

## 2025-03-07 RX ORDER — CEFAZOLIN SODIUM 2 G/50ML
2000 SOLUTION INTRAVENOUS ONCE
Status: COMPLETED | OUTPATIENT
Start: 2025-03-07 | End: 2025-03-07

## 2025-03-07 RX ORDER — KETAMINE HCL IN NACL, ISO-OSM 100MG/10ML
SYRINGE (ML) INJECTION AS NEEDED
Status: DISCONTINUED | OUTPATIENT
Start: 2025-03-07 | End: 2025-03-07

## 2025-03-07 RX ORDER — ACETAMINOPHEN 10 MG/ML
INJECTION, SOLUTION INTRAVENOUS AS NEEDED
Status: DISCONTINUED | OUTPATIENT
Start: 2025-03-07 | End: 2025-03-07

## 2025-03-07 RX ORDER — HYDROMORPHONE HCL/PF 1 MG/ML
0.5 SYRINGE (ML) INJECTION
Status: COMPLETED | OUTPATIENT
Start: 2025-03-07 | End: 2025-03-07

## 2025-03-07 RX ORDER — ACETAMINOPHEN 325 MG/1
325 TABLET ORAL EVERY 6 HOURS SCHEDULED
Status: DISCONTINUED | OUTPATIENT
Start: 2025-03-07 | End: 2025-03-08 | Stop reason: HOSPADM

## 2025-03-07 RX ORDER — HEPARIN SODIUM 5000 [USP'U]/ML
INJECTION, SOLUTION INTRAVENOUS; SUBCUTANEOUS AS NEEDED
Status: DISCONTINUED | OUTPATIENT
Start: 2025-03-07 | End: 2025-03-07

## 2025-03-07 RX ORDER — ALBUTEROL SULFATE 0.83 MG/ML
2.5 SOLUTION RESPIRATORY (INHALATION) ONCE AS NEEDED
Status: DISCONTINUED | OUTPATIENT
Start: 2025-03-07 | End: 2025-03-07 | Stop reason: HOSPADM

## 2025-03-07 RX ORDER — MIDAZOLAM HYDROCHLORIDE 2 MG/2ML
INJECTION, SOLUTION INTRAMUSCULAR; INTRAVENOUS AS NEEDED
Status: DISCONTINUED | OUTPATIENT
Start: 2025-03-07 | End: 2025-03-07

## 2025-03-07 RX ORDER — POLYETHYLENE GLYCOL 3350 17 G/17G
17 POWDER, FOR SOLUTION ORAL DAILY PRN
Status: DISCONTINUED | OUTPATIENT
Start: 2025-03-07 | End: 2025-03-08 | Stop reason: HOSPADM

## 2025-03-07 RX ORDER — SODIUM CHLORIDE, SODIUM LACTATE, POTASSIUM CHLORIDE, CALCIUM CHLORIDE 600; 310; 30; 20 MG/100ML; MG/100ML; MG/100ML; MG/100ML
INJECTION, SOLUTION INTRAVENOUS CONTINUOUS PRN
Status: DISCONTINUED | OUTPATIENT
Start: 2025-03-07 | End: 2025-03-07

## 2025-03-07 RX ORDER — HYDROCODONE BITARTRATE AND ACETAMINOPHEN 5; 325 MG/1; MG/1
1 TABLET ORAL EVERY 6 HOURS PRN
Refills: 0 | Status: DISCONTINUED | OUTPATIENT
Start: 2025-03-07 | End: 2025-03-08

## 2025-03-07 RX ORDER — ONDANSETRON 2 MG/ML
4 INJECTION INTRAMUSCULAR; INTRAVENOUS ONCE AS NEEDED
Status: DISCONTINUED | OUTPATIENT
Start: 2025-03-07 | End: 2025-03-07 | Stop reason: HOSPADM

## 2025-03-07 RX ORDER — ENOXAPARIN SODIUM 100 MG/ML
40 INJECTION SUBCUTANEOUS DAILY
Status: DISCONTINUED | OUTPATIENT
Start: 2025-03-08 | End: 2025-03-08 | Stop reason: HOSPADM

## 2025-03-07 RX ORDER — HYDROMORPHONE HCL/PF 1 MG/ML
SYRINGE (ML) INJECTION AS NEEDED
Status: DISCONTINUED | OUTPATIENT
Start: 2025-03-07 | End: 2025-03-07

## 2025-03-07 RX ORDER — PANTOPRAZOLE SODIUM 40 MG/10ML
40 INJECTION, POWDER, LYOPHILIZED, FOR SOLUTION INTRAVENOUS DAILY
Status: DISCONTINUED | OUTPATIENT
Start: 2025-03-07 | End: 2025-03-08 | Stop reason: HOSPADM

## 2025-03-07 RX ORDER — CYCLOBENZAPRINE HCL 5 MG
5 TABLET ORAL
Status: DISCONTINUED | OUTPATIENT
Start: 2025-03-07 | End: 2025-03-08 | Stop reason: HOSPADM

## 2025-03-07 RX ORDER — ROCURONIUM BROMIDE 10 MG/ML
INJECTION, SOLUTION INTRAVENOUS AS NEEDED
Status: DISCONTINUED | OUTPATIENT
Start: 2025-03-07 | End: 2025-03-07

## 2025-03-07 RX ORDER — FENTANYL CITRATE/PF 50 MCG/ML
50 SYRINGE (ML) INJECTION
Refills: 0 | Status: COMPLETED | OUTPATIENT
Start: 2025-03-07 | End: 2025-03-07

## 2025-03-07 RX ORDER — ONDANSETRON 2 MG/ML
4 INJECTION INTRAMUSCULAR; INTRAVENOUS EVERY 6 HOURS PRN
Status: DISCONTINUED | OUTPATIENT
Start: 2025-03-07 | End: 2025-03-08 | Stop reason: HOSPADM

## 2025-03-07 RX ORDER — ONDANSETRON 2 MG/ML
INJECTION INTRAMUSCULAR; INTRAVENOUS AS NEEDED
Status: DISCONTINUED | OUTPATIENT
Start: 2025-03-07 | End: 2025-03-07

## 2025-03-07 RX ORDER — DOCUSATE SODIUM 100 MG/1
100 CAPSULE, LIQUID FILLED ORAL 2 TIMES DAILY
Status: DISCONTINUED | OUTPATIENT
Start: 2025-03-07 | End: 2025-03-08 | Stop reason: HOSPADM

## 2025-03-07 RX ORDER — DEXAMETHASONE SODIUM PHOSPHATE 10 MG/ML
INJECTION, SOLUTION INTRAMUSCULAR; INTRAVENOUS AS NEEDED
Status: DISCONTINUED | OUTPATIENT
Start: 2025-03-07 | End: 2025-03-07

## 2025-03-07 RX ORDER — LABETALOL HYDROCHLORIDE 5 MG/ML
10 INJECTION, SOLUTION INTRAVENOUS EVERY 6 HOURS PRN
Status: DISCONTINUED | OUTPATIENT
Start: 2025-03-07 | End: 2025-03-08 | Stop reason: HOSPADM

## 2025-03-07 RX ORDER — BUPIVACAINE HYDROCHLORIDE 2.5 MG/ML
INJECTION, SOLUTION EPIDURAL; INFILTRATION; INTRACAUDAL AS NEEDED
Status: DISCONTINUED | OUTPATIENT
Start: 2025-03-07 | End: 2025-03-07 | Stop reason: HOSPADM

## 2025-03-07 RX ORDER — FENTANYL CITRATE 50 UG/ML
INJECTION, SOLUTION INTRAMUSCULAR; INTRAVENOUS AS NEEDED
Status: DISCONTINUED | OUTPATIENT
Start: 2025-03-07 | End: 2025-03-07

## 2025-03-07 RX ORDER — PROPOFOL 10 MG/ML
INJECTION, EMULSION INTRAVENOUS AS NEEDED
Status: DISCONTINUED | OUTPATIENT
Start: 2025-03-07 | End: 2025-03-07

## 2025-03-07 RX ORDER — HYDROMORPHONE HCL/PF 1 MG/ML
0.5 SYRINGE (ML) INJECTION
Status: DISCONTINUED | OUTPATIENT
Start: 2025-03-07 | End: 2025-03-08 | Stop reason: HOSPADM

## 2025-03-07 RX ORDER — SUCCINYLCHOLINE/SOD CL,ISO/PF 100 MG/5ML
SYRINGE (ML) INTRAVENOUS AS NEEDED
Status: DISCONTINUED | OUTPATIENT
Start: 2025-03-07 | End: 2025-03-07

## 2025-03-07 RX ORDER — MELOXICAM 7.5 MG/1
7.5 TABLET ORAL DAILY PRN
Status: DISCONTINUED | OUTPATIENT
Start: 2025-03-07 | End: 2025-03-08 | Stop reason: HOSPADM

## 2025-03-07 RX ORDER — SODIUM CHLORIDE, SODIUM LACTATE, POTASSIUM CHLORIDE, CALCIUM CHLORIDE 600; 310; 30; 20 MG/100ML; MG/100ML; MG/100ML; MG/100ML
75 INJECTION, SOLUTION INTRAVENOUS CONTINUOUS
Status: DISCONTINUED | OUTPATIENT
Start: 2025-03-07 | End: 2025-03-08

## 2025-03-07 RX ADMIN — ACETAMINOPHEN 325 MG: 325 TABLET, FILM COATED ORAL at 16:37

## 2025-03-07 RX ADMIN — HYDROMORPHONE HYDROCHLORIDE 0.5 MG: 1 INJECTION, SOLUTION INTRAMUSCULAR; INTRAVENOUS; SUBCUTANEOUS at 20:12

## 2025-03-07 RX ADMIN — PROPOFOL 60 MCG/KG/MIN: 10 INJECTION, EMULSION INTRAVENOUS at 07:23

## 2025-03-07 RX ADMIN — HYDROCODONE BITARTRATE AND ACETAMINOPHEN 2 TABLET: 5; 325 TABLET ORAL at 18:02

## 2025-03-07 RX ADMIN — ROCURONIUM 50 MG: 50 INJECTION, SOLUTION INTRAVENOUS at 07:33

## 2025-03-07 RX ADMIN — Medication 30 MG: at 07:19

## 2025-03-07 RX ADMIN — HYDROMORPHONE HYDROCHLORIDE 0.5 MG: 1 INJECTION, SOLUTION INTRAMUSCULAR; INTRAVENOUS; SUBCUTANEOUS at 10:28

## 2025-03-07 RX ADMIN — LIDOCAINE HYDROCHLORIDE 100 MG: 20 INJECTION, SOLUTION EPIDURAL; INFILTRATION; INTRACAUDAL at 07:19

## 2025-03-07 RX ADMIN — HYDROCODONE BITARTRATE AND ACETAMINOPHEN 1 TABLET: 5; 325 TABLET ORAL at 13:59

## 2025-03-07 RX ADMIN — MIDAZOLAM 2 MG: 1 INJECTION INTRAMUSCULAR; INTRAVENOUS at 07:11

## 2025-03-07 RX ADMIN — SODIUM CHLORIDE, SODIUM LACTATE, POTASSIUM CHLORIDE, AND CALCIUM CHLORIDE: .6; .31; .03; .02 INJECTION, SOLUTION INTRAVENOUS at 06:45

## 2025-03-07 RX ADMIN — PROPOFOL 180 MG: 10 INJECTION, EMULSION INTRAVENOUS at 07:19

## 2025-03-07 RX ADMIN — PROPOFOL 100 MG: 10 INJECTION, EMULSION INTRAVENOUS at 07:33

## 2025-03-07 RX ADMIN — MELOXICAM 7.5 MG: 7.5 TABLET ORAL at 16:45

## 2025-03-07 RX ADMIN — SODIUM CHLORIDE, SODIUM LACTATE, POTASSIUM CHLORIDE, AND CALCIUM CHLORIDE 75 ML/HR: .6; .31; .03; .02 INJECTION, SOLUTION INTRAVENOUS at 14:16

## 2025-03-07 RX ADMIN — FENTANYL CITRATE 50 MCG: 50 INJECTION INTRAMUSCULAR; INTRAVENOUS at 10:51

## 2025-03-07 RX ADMIN — FENTANYL CITRATE 50 MCG: 50 INJECTION INTRAMUSCULAR; INTRAVENOUS at 10:45

## 2025-03-07 RX ADMIN — Medication 100 MG: at 07:19

## 2025-03-07 RX ADMIN — ROCURONIUM 20 MG: 50 INJECTION, SOLUTION INTRAVENOUS at 08:22

## 2025-03-07 RX ADMIN — HYDROMORPHONE HYDROCHLORIDE 0.5 MG: 1 INJECTION, SOLUTION INTRAMUSCULAR; INTRAVENOUS; SUBCUTANEOUS at 10:18

## 2025-03-07 RX ADMIN — ROCURONIUM 30 MG: 50 INJECTION, SOLUTION INTRAVENOUS at 09:01

## 2025-03-07 RX ADMIN — DEXAMETHASONE SODIUM PHOSPHATE 10 MG: 10 INJECTION INTRAMUSCULAR; INTRAVENOUS at 07:19

## 2025-03-07 RX ADMIN — HEPARIN SODIUM 5000 UNITS: 5000 INJECTION, SOLUTION INTRAVENOUS; SUBCUTANEOUS at 07:44

## 2025-03-07 RX ADMIN — FENTANYL CITRATE 50 MCG: 50 INJECTION INTRAMUSCULAR; INTRAVENOUS at 11:09

## 2025-03-07 RX ADMIN — MONTELUKAST 10 MG: 10 TABLET, FILM COATED ORAL at 22:00

## 2025-03-07 RX ADMIN — HYDROMORPHONE HYDROCHLORIDE 0.5 MG: 1 INJECTION, SOLUTION INTRAMUSCULAR; INTRAVENOUS; SUBCUTANEOUS at 12:50

## 2025-03-07 RX ADMIN — SUGAMMADEX 200 MG: 100 INJECTION, SOLUTION INTRAVENOUS at 10:15

## 2025-03-07 RX ADMIN — ACETAMINOPHEN 1000 MG: 10 INJECTION INTRAVENOUS at 10:06

## 2025-03-07 RX ADMIN — PANTOPRAZOLE SODIUM 40 MG: 40 INJECTION, POWDER, FOR SOLUTION INTRAVENOUS at 14:21

## 2025-03-07 RX ADMIN — Medication 10 MG: at 08:22

## 2025-03-07 RX ADMIN — DOCUSATE SODIUM 100 MG: 100 CAPSULE, LIQUID FILLED ORAL at 16:37

## 2025-03-07 RX ADMIN — FENTANYL CITRATE 100 MCG: 50 INJECTION INTRAMUSCULAR; INTRAVENOUS at 07:19

## 2025-03-07 RX ADMIN — Medication 10 MG: at 09:38

## 2025-03-07 RX ADMIN — FENTANYL CITRATE 50 MCG: 50 INJECTION INTRAMUSCULAR; INTRAVENOUS at 11:14

## 2025-03-07 RX ADMIN — CEFAZOLIN SODIUM 2000 MG: 2 SOLUTION INTRAVENOUS at 07:29

## 2025-03-07 RX ADMIN — ROCURONIUM 20 MG: 50 INJECTION, SOLUTION INTRAVENOUS at 09:21

## 2025-03-07 RX ADMIN — HYDROMORPHONE HYDROCHLORIDE 0.5 MG: 1 INJECTION, SOLUTION INTRAMUSCULAR; INTRAVENOUS; SUBCUTANEOUS at 12:06

## 2025-03-07 RX ADMIN — SODIUM CHLORIDE: 0.9 INJECTION, SOLUTION INTRAVENOUS at 07:24

## 2025-03-07 RX ADMIN — ONDANSETRON 4 MG: 2 INJECTION, SOLUTION INTRAMUSCULAR; INTRAVENOUS at 09:37

## 2025-03-07 RX ADMIN — HYDROMORPHONE HYDROCHLORIDE 0.5 MG: 1 INJECTION, SOLUTION INTRAMUSCULAR; INTRAVENOUS; SUBCUTANEOUS at 14:33

## 2025-03-07 RX ADMIN — ACETAMINOPHEN 325 MG: 325 TABLET, FILM COATED ORAL at 22:00

## 2025-03-07 RX ADMIN — STANDARDIZED SENNA CONCENTRATE 8.6 MG: 8.6 TABLET ORAL at 14:20

## 2025-03-07 NOTE — ANESTHESIA POSTPROCEDURE EVALUATION
Post-Op Assessment Note    CV Status:  Stable         Mental Status:  Awake and confused   Hydration Status:  Euvolemic   PONV Controlled:  Controlled   Airway Patency:  Patent     Post Op Vitals Reviewed: Yes    No anethesia notable event occurred.    Staff: CRNA, Anesthesiologist           Last Filed PACU Vitals:  Vitals Value Taken Time   Temp 97.5    Pulse 97 03/07/25 1037   /90 03/07/25 1035   Resp 22 03/07/25 1037   SpO2 94 % 03/07/25 1037   Vitals shown include unfiled device data.

## 2025-03-07 NOTE — OP NOTE
OPERATIVE REPORT  PATIENT NAME: Desean Erickson    :  1965  MRN: [id number]  Pt Location: [facility] OR ROOM 14    SURGERY DATE: 3/7/2025    Surgeons and Role:     * Petar Sloan, DO - Primary     * Tosha Florez PA-C - Assisting    Preop Diagnosis:  Gastroesophageal reflux disease without esophagitis [K21.9]    Post-Op Diagnosis Codes:     * Gastroesophageal reflux disease without esophagitis [K21.9]    Procedure(s):  EGD   Robotic repair of hiatal hernia with Rick fundoplication and BioA mesh implantation    Specimen(s):  * No specimens in log *    Estimated Blood Loss:   Minimal    Drains:  * No LDAs found *    Anesthesia Type:   General    Operative Indications:  Gastroesophageal reflux disease without esophagitis [K21.9]      Operative Findings:  Type I hiatal hernia  Esophagus and EGJ with extensive inflammation at the diaphragmatic hiatus. Esophagus was very stuck to the crura bilaterally.   Excellent >3cm intra-abdominal length obtained.         Complications:   None    Procedure and Technique:  After obtaining informed consent from the patient, they were transferred to the operating room and placed supine on the operating table. General anesthesia was then induced and a single-lumen endotracheal tube was placed without incident.  A footboard was placed at the base of the bed and the patients feet, legs and thighs were secured to the bed. All bony prominences were padded and checked.      A formal time-out was called verifying patient , date of birth, procedure, consent, antibiotics, beta-blocker as indicated, anticipated specimens with handling, SCD use and other special considerations.     The abdomen was then prepped with ChloraPrep and draped in standard surgical fashion. A stab incision was made at Lieberman's point, 2 finger breaths below the left costal margin in the midclavicular line. The Veress needle was inserted through the abdominal wall and into the abdominal cavity using the water  drop technique. Next the abdomen was then insufflated without issue.  A supraumbilical 8 mm incision was made and the abdomen was entered using the Optiview technique with a 0 degree scope. There was no sign Veress needle or trocar injury. All other ports were placed under direct visualization using the laparoscopic camera.  3mL of 0.25% Marcaine was used to anesthetize the peritoneum for all additional port placements. Three 8mm robotic ports were placed in a row - one to the patient's right and two to the left. A RLQ 8mm assistant port was placed between the camera and the RUQ robotic port.  Finally a 5 mm incision was made just to the left of the xiphoid process.  A small Beryl liver retractor was placed through here and secured to the bedside with a sterile barnett. The supraumbilical port was exchanged out for a robotic 8mm port. The patient was then placed in full reverse tren[place]burg at 30 degrees.     The [employer] robot was docked at this time with the 30 degree robotic scope. A tip-up fenestrated grasper, caudier grasper and robotic vessel sealer were inserted and tested. I un-scrubbed at this time and went to the robotic console.     A  type 1 hiatal hernia was seen. I started the dissection at the pars using the vessel sealer. There was extensive inflammation from the crura to the esophagus. Dissection was continued anteriorly and to the left. The avascular plane of the hernia sac was developed circumferentially. I then moved to the greater curvature and took the superior most 1/3 of the short gastric vessels with the vessel sealer. My dissection was continued to meet the hernia sac dissection at the left constantin. The esophagus was encircled with a penrose. Dissection was continued high into the chest above the inferior pulmonary veins. The esophagus was fully mobile high into the chest and was free off of the aorta.       Next I placed a 52Fr bougie and performed a crural repair. Interrupted 0 Ethibond  sutures were used to reapproximate the crura posteriorly.  A total of 3 sutures were placed posteriorly.  When finished the esophagus without any bougie or EGD had a few mm of room circumferentially around the crura.  Next a Bio A [place] semi-biologic mesh was placed at the hiatus given the exposed muscle of the left constantin. This was positioned in place behind the liver and underneath the gastroesophageal junction.    I then performed a partial anterior Rick fundoplication using interrupted 2-0 Ethibond incorporating fundus, esophagus and the crura. 7 sutures were used.      The abdomen was then inspected and thoroughly aspirated dry.  There was no active bleeding identified. The robot was undocked at this time and all robotic instruments were removed.. The skin and soft tissue was closed with 4 Monocryl.  Surgical glue was applied to all incisions.      An EGD was performed.  The adult endoscope was inserted into the patient's oropharynx and directed down into their esophagus. This passed through the wrap without any issue. Pylorus was open. There were no abnormalities in the stomach.      Sponge, needle and instrument counts were correct at the conclusion of the procedure. The patient was extubated without incident in the operating room and was transported to the PACU in stable condition. RAFFI Florez was scrubbed as a bedside assistant for the entirety of the procedure as no other qualified assistant or general surgery resident was available. She was critical to the performance of this operation as she was the bedside robotic assistant. In doing so, she aided with retraction, needle insertion/removal, irrigation, suctioning and instrument exchange.       I was present for the entire procedure.       Patient Disposition:  PACU              SIGNATURE: Petar Sloan DO  DATE: March 7, 2025  TIME: 10:33 AM

## 2025-03-07 NOTE — LETTER
[facility] [address]  [address]  [place] [address]  Dept: [phone]    March 8, 2025     Patient: Desean Erickson   YOB: 1965   Date of Visit: 3/7/2025       Dear Dr. Velázquez:    Desean Erickson is under my professional care. He was seen in the hospital from 3/7/2025 to 03/08/25. He will be discharged today 3/8/2025 versus tomorrow 3/9/2025 pending his toleration of full liquid diet and pain control.  He was in the hospital due to a planned minimally invasive surgery.  I did not prescribe him any additional pain medications as he receives his Norco and muscle relaxant regularly prescribed by you.  Wanted to make you aware of the surgery.  Please call our office if you have any questions or concerns.     If you have any questions or concerns, please don't hesitate to call.         Sincerely,          Tosha Florez PA-C  Thoracic Surgery   [phone]

## 2025-03-07 NOTE — ANESTHESIA POSTPROCEDURE EVALUATION
Post-Op Assessment Note    CV Status:  Stable    Pain management: adequate       Mental Status:  Awake   Hydration Status:  Euvolemic   PONV Controlled:  Controlled   Airway Patency:  Patent     Post Op Vitals Reviewed: Yes    No anethesia notable event occurred.    Staff: Anesthesiologist           Last Filed PACU Vitals:  Vitals Value Taken Time   Temp 97.5 °F (36.4 °C) 03/07/25 1033   Pulse 98 03/07/25 1400   /71 03/07/25 1300   Resp 24 03/07/25 1400   SpO2 94 % 03/07/25 1400   Vitals shown include unfiled device data.    Modified Evelyn:     Vitals Value Taken Time   Activity 2 03/07/25 1355   Respiration 2 03/07/25 1355   Circulation 2 03/07/25 1355   Consciousness 2 03/07/25 1355   Oxygen Saturation 2 03/07/25 1355     Modified Evelyn Score: 10

## 2025-03-07 NOTE — QUICK NOTE
Thoracic Surgery Postop Check:    Procedure: Robotic hiatal hernia repair with uziel fundoplication and mesh placement     Plan:  - Continue clear liquid diet  - Obtain upper GI in AM  - Advance to full liquid diet pending upper GI results  - IV fluid hydration  - PRN HTN control, labetalol   - Pain and nausea control PRN  - Encourage IS, ambulation   - PT/OT eval and treat   - DVT ppx     Subjective:  No acute distress.  Resting comfortably in bed.  Endorsing feelings of epigastric pain, reflux, belching.  Tolerating clears.  Denies dysphagia or odynophagia.  Denies nausea, vomiting, shortness of breath, chest pain.  Has not voided or passed flatus yet since surgery.    Objective  Vitals:    03/07/25 1416 03/07/25 1438 03/07/25 1455 03/07/25 1508   BP: (!) 178/107 (!) 166/107 (!) 182/104    Pulse:  73 72    Resp:       Temp: 98.1 °F (36.7 °C)      TempSrc:       SpO2:  92% 92% 93%   Weight:       Height:           GENERAL: No acute distress  CV: Regular rate  LUNGS: Nonlabored respirations on RA  ABDOMEN: Abdomen soft, appropriately tender, mildly distended.  Incisions clean, dry, intact     ---  Teena Navarrete MD  General Surgery PGY-II

## 2025-03-07 NOTE — INTERVAL H&P NOTE
H&P reviewed. After examining the patient I find no changes in the patients condition since the H&P had been written.    OR for robotic hiatal and partial fundoplication.

## 2025-03-08 ENCOUNTER — APPOINTMENT (OUTPATIENT)
Dept: RADIOLOGY | Facility: HOSPITAL | Age: 60
End: 2025-03-08
Payer: COMMERCIAL

## 2025-03-08 VITALS
SYSTOLIC BLOOD PRESSURE: 156 MMHG | HEART RATE: 91 BPM | DIASTOLIC BLOOD PRESSURE: 90 MMHG | OXYGEN SATURATION: 93 % | HEIGHT: 75 IN | BODY MASS INDEX: 25.3 KG/M2 | TEMPERATURE: 98.1 F | WEIGHT: 203.48 LBS | RESPIRATION RATE: 17 BRPM

## 2025-03-08 LAB
ANION GAP SERPL CALCULATED.3IONS-SCNC: 9 MMOL/L (ref 4–13)
BUN SERPL-MCNC: 15 MG/DL (ref 5–25)
CALCIUM SERPL-MCNC: 9.3 MG/DL (ref 8.4–10.2)
CHLORIDE SERPL-SCNC: 103 MMOL/L (ref 96–108)
CO2 SERPL-SCNC: 26 MMOL/L (ref 21–32)
CREAT SERPL-MCNC: 0.79 MG/DL (ref 0.6–1.3)
ERYTHROCYTE [DISTWIDTH] IN BLOOD BY AUTOMATED COUNT: 12.3 % (ref 11.6–15.1)
GFR SERPL CREATININE-BSD FRML MDRD: 97 ML/MIN/1.73SQ M
GLUCOSE SERPL-MCNC: 111 MG/DL (ref 65–140)
HCT VFR BLD AUTO: 39.8 % (ref 36.5–49.3)
HGB BLD-MCNC: 13.6 G/DL (ref 12–17)
MAGNESIUM SERPL-MCNC: 2 MG/DL (ref 1.9–2.7)
MCH RBC QN AUTO: 31.7 PG (ref 26.8–34.3)
MCHC RBC AUTO-ENTMCNC: 34.2 G/DL (ref 31.4–37.4)
MCV RBC AUTO: 93 FL (ref 82–98)
PLATELET # BLD AUTO: 274 THOUSANDS/UL (ref 149–390)
PMV BLD AUTO: 8.7 FL (ref 8.9–12.7)
POTASSIUM SERPL-SCNC: 3.9 MMOL/L (ref 3.5–5.3)
RBC # BLD AUTO: 4.29 MILLION/UL (ref 3.88–5.62)
SODIUM SERPL-SCNC: 138 MMOL/L (ref 135–147)
WBC # BLD AUTO: 13.51 THOUSAND/UL (ref 4.31–10.16)

## 2025-03-08 PROCEDURE — 74220 X-RAY XM ESOPHAGUS 1CNTRST: CPT

## 2025-03-08 PROCEDURE — 97163 PT EVAL HIGH COMPLEX 45 MIN: CPT

## 2025-03-08 PROCEDURE — 83735 ASSAY OF MAGNESIUM: CPT | Performed by: SURGERY

## 2025-03-08 PROCEDURE — 85027 COMPLETE CBC AUTOMATED: CPT | Performed by: SURGERY

## 2025-03-08 PROCEDURE — 97167 OT EVAL HIGH COMPLEX 60 MIN: CPT

## 2025-03-08 PROCEDURE — 80048 BASIC METABOLIC PNL TOTAL CA: CPT | Performed by: SURGERY

## 2025-03-08 PROCEDURE — 94664 DEMO&/EVAL PT USE INHALER: CPT

## 2025-03-08 PROCEDURE — 99024 POSTOP FOLLOW-UP VISIT: CPT | Performed by: THORACIC SURGERY (CARDIOTHORACIC VASCULAR SURGERY)

## 2025-03-08 RX ORDER — HYDROCODONE BITARTRATE AND ACETAMINOPHEN 5; 325 MG/1; MG/1
2 TABLET ORAL EVERY 4 HOURS PRN
Status: DISCONTINUED | OUTPATIENT
Start: 2025-03-08 | End: 2025-03-08 | Stop reason: HOSPADM

## 2025-03-08 RX ORDER — HYDROCODONE BITARTRATE AND ACETAMINOPHEN 5; 325 MG/1; MG/1
1 TABLET ORAL EVERY 4 HOURS PRN
Status: DISCONTINUED | OUTPATIENT
Start: 2025-03-08 | End: 2025-03-08 | Stop reason: HOSPADM

## 2025-03-08 RX ORDER — CYCLOBENZAPRINE HCL 10 MG
10 TABLET ORAL ONCE
Status: COMPLETED | OUTPATIENT
Start: 2025-03-08 | End: 2025-03-08

## 2025-03-08 RX ADMIN — DOCUSATE SODIUM 100 MG: 100 CAPSULE, LIQUID FILLED ORAL at 08:06

## 2025-03-08 RX ADMIN — HYDROMORPHONE HYDROCHLORIDE 0.5 MG: 1 INJECTION, SOLUTION INTRAMUSCULAR; INTRAVENOUS; SUBCUTANEOUS at 08:06

## 2025-03-08 RX ADMIN — HYDROCODONE BITARTRATE AND ACETAMINOPHEN 2 TABLET: 5; 325 TABLET ORAL at 06:19

## 2025-03-08 RX ADMIN — HYDROMORPHONE HYDROCHLORIDE 0.5 MG: 1 INJECTION, SOLUTION INTRAMUSCULAR; INTRAVENOUS; SUBCUTANEOUS at 03:34

## 2025-03-08 RX ADMIN — ENOXAPARIN SODIUM 40 MG: 40 INJECTION SUBCUTANEOUS at 08:06

## 2025-03-08 RX ADMIN — ACETAMINOPHEN 325 MG: 325 TABLET, FILM COATED ORAL at 03:33

## 2025-03-08 RX ADMIN — STANDARDIZED SENNA CONCENTRATE 8.6 MG: 8.6 TABLET ORAL at 08:06

## 2025-03-08 RX ADMIN — PANTOPRAZOLE SODIUM 40 MG: 40 INJECTION, POWDER, FOR SOLUTION INTRAVENOUS at 08:06

## 2025-03-08 RX ADMIN — HYDROCODONE BITARTRATE AND ACETAMINOPHEN 2 TABLET: 5; 325 TABLET ORAL at 11:03

## 2025-03-08 RX ADMIN — IOHEXOL 100 ML: 350 INJECTION, SOLUTION INTRAVENOUS at 09:42

## 2025-03-08 RX ADMIN — SODIUM CHLORIDE, SODIUM LACTATE, POTASSIUM CHLORIDE, AND CALCIUM CHLORIDE 75 ML/HR: .6; .31; .03; .02 INJECTION, SOLUTION INTRAVENOUS at 03:34

## 2025-03-08 RX ADMIN — CYCLOBENZAPRINE HYDROCHLORIDE 10 MG: 10 TABLET, FILM COATED ORAL at 06:35

## 2025-03-08 RX ADMIN — HYDROCODONE BITARTRATE AND ACETAMINOPHEN 2 TABLET: 5; 325 TABLET ORAL at 00:20

## 2025-03-08 NOTE — PROGRESS NOTES
Progress Note - Thoracic    Name: Desean Erickson 60 y.o. male I MRN: [id number]  Unit/Bed#: [facility] 501-01 I Date of Admission: 3/7/2025   Date of Service: 3/8/2025 I Hospital Day: 0    Assessment & Plan  Gastroesophageal reflux disease without esophagitis  -Now s/p robotic paraesophageal hernia repair with with mesh, Rick fundoplication, 3/7  -Clears for now  -UGI this morning, fulls if passes  -Dispo planning        24 Hour Events : No acute events overnight  Subjective : Having some spasms in the right upper abdomen, otherwise tolerating clears, no nausea or emesis, denies feeling bloated, no flatus or bowel movement yet, voiding, using incentive spirometry.    Objective :  Temp:  [97.5 °F (36.4 °C)-98.1 °F (36.7 °C)] 98.1 °F (36.7 °C)  HR:  [] 91  BP: (124-182)/() 156/90  Resp:  [10-24] 17  SpO2:  [92 %-98 %] 92 %  O2 Device: None (Room air)  Nasal Cannula O2 Flow Rate (L/min):  [2 L/min-3 L/min] 2 L/min  FiO2 (%):  [2] 2    I/O         03/06 0701  03/07 0700 03/07 0701  03/08 0700 03/08 0701  03/09 0700    P.O.  236     I.V. (mL/kg)  2769.4 (30)     IV Piggyback  150     Total Intake(mL/kg)  3155.4 (34.2)     Urine (mL/kg/hr)  1000 (0.5)     Total Output  1000     Net  +2155.4            Unmeasured Urine Occurrence  2 x             Physical Exam    General: NAD  Skin: Warm, dry, anicteric  HEENT: Normocephalic, atraumatic  CV: RRR, no m/r/g  Pulm: CTA b/l, no inc WOB  Abd: Soft, ND, tender in the right upper quadrant, clean dry and intact  MSK: Symmetric, no edema, no tenderness, no deformity  Neuro: AOx3, GCS 15       Lab Results: I have reviewed the following results:  Recent Labs     03/08/25  0345   WBC 13.51*   HGB 13.6   HCT 39.8      SODIUM 138   K 3.9      CO2 26   BUN 15   CREATININE 0.79   GLUC 111   MG 2.0             VTE Pharmacologic Prophylaxis: VTE covered by:  enoxaparin, Subcutaneous     VTE Mechanical Prophylaxis: sequential compression device

## 2025-03-08 NOTE — PLAN OF CARE
Problem: PHYSICAL THERAPY ADULT  Goal: Performs mobility at highest level of function for planned discharge setting.  See evaluation for individualized goals.  Description: Treatment/Interventions: ADL retraining, Functional transfer training, LE strengthening/ROM, Elevations, Therapeutic exercise, Endurance training, Bed mobility, Gait training, Spoke to nursing, OT          See flowsheet documentation for full assessment, interventions and recommendations.  Note: Prognosis: Good  Problem List: Decreased strength, Decreased range of motion, Decreased mobility, Decreased endurance, Impaired balance, Pain  Assessment: PT orders received and acknowledged. Patient was seen today for high complexity PT evaluation. High complexity evaluation due to Ongoing medical management for primary dx, Increased reliance on more restrictive AD compared to baseline, Decreased activity tolerance compared to baseline, Fall risk, Continuous pulse oximetry monitoring , s/p surgical intervention Patient is a 60 y.o. male  who was admitted to [facility] on 3/7/2025  with Gastroesophageal reflux disease without esophagitis . Pt presents to [facility] for planned hernia repair . At baseline, pt resides with spouse in house and was independent prior to hospital admission. Currently, upon initial examination, pt  is requiring supervision   for bed mobility skills;  min assist x1 for functional transfers and  min assist x1 for ambulation with RW.  Patient currently presents below baseline with limitations in gait, balance, and transfers. Patient will benefit from continued PT services while in hospital in order to address remaining limitations. The patients AM-PAC Basic Mobility Inpatient Short From Raw Score is 18 . Based on AM-PAC scoring and patient presentation, PT currently recommending Level III (Minimum Resource Intensity). Please also refer to the recommendation of the Physical Therapist for safe discharge planning.  Barriers to  Discharge: None     Rehab Resource Intensity Level, PT: III (Minimum Resource Intensity)    See flowsheet documentation for full assessment.
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
## 2025-03-08 NOTE — DISCHARGE INSTR - AVS FIRST PAGE
"Hiatal Hernia Repair    During this hospitalization you underwent a minimally invasive hiatal hernia repair with a fundoplication. Your discharge instructions are below.     Incision Care:  - Your incisions were closed with stitches underneath of the skin which will dissolve. There is purple surgical glue on top of the incisions. The surgical glue will flake off over the next few weeks.   - You do not need dressings over your other incisions.  Do not apply any cream or ointments to the incisions unless instructed by your surgeon.  - You may shower daily - no soaking in a tub bath. Wash your incisions gently with soap and water and pat dry. Do not scrub the incisions.  - Bruising at your incisions is normal. This will resolve over the next few weeks.     Activity  - No lifting greater than 10lbs until you are evaluated in the office.   - Walking and light activity is encouraged. Recommend that you are active during the day.  - No driving while you are using narcotic pain medications. If you are no longer taking narcotics and considering driving, you must make sure you can quickly react to changes on the road. This can be challenging in the first few weeks after surgery.     Pain:  - Some degree of pain or discomfort after surgery is expected.    - Please refer to your medication list for your pain regimen.  You are regularly prescribed Norco  mg by pain specialist.  Please follow your pain regimen as directed by pain management.    Diet:  - Refer to the \"Diet after Paraesophageal Hernia Repair\" diet instructions you were given preoperatively.   - It is normal to have difficulty with fluids and food in the postoperative period. This is due to swelling and will improve with time.     Medications:  - Continue on your home medications including any blood thinner and aspirin, as instructed.     Bowel Regiment:  - Constipation after surgery while on narcotic pain medications is normal.  - You should continue taking a " bowel regiment while on a narcotic pain medication to keep your bowel movements soft. Your discharge bowel regiment:   - Docusate (Colace) 100mg tablet. Take 1 tablet, twice a day.    - Senna 8.6mg tablet. Take 1 tablet daily.   - If your bowel movements become lose, stop taking the bowel regiment above.     Follow-up:  - You have a scheduled follow-up appointment on: 3/26/25 at 9:00am, at [facility] [address] , [place], [place], [address].  - A couple of days after discharge our office will call you to check in with how you are recovering at home.     Concerns:  - Call the office for any questions or concerns. Many postoperative issues can be sorted out over the phone.   - Call the office or go to the Emergency Department if you develop a fever greater than 101, persistent chills, persistent nausea/vomiting, inability to take in sufficient food or fluids, worsening or uncontrolled pain, and/or increasing redness or foul smelling drainage from an incision.     Thoracic Surgery Office: [phone]    Dr. William Burfeind, MD Rachael Hart, PA-C Dr. Meredith Harrison, MD Amylyn Mortimer, PA-C Dr. Dustin Manchester, MD Dr. Stephen Dingley, DO

## 2025-03-08 NOTE — ASSESSMENT & PLAN NOTE
-Now s/p robotic paraesophageal hernia repair with with mesh, Rick fundoplication, 3/7  -Clears for now  -UGI this morning, fulls if passes  -Dispo planning

## 2025-03-13 ENCOUNTER — TELEPHONE (OUTPATIENT)
Dept: PAIN MEDICINE | Facility: CLINIC | Age: 60
End: 2025-03-13

## 2025-03-13 ENCOUNTER — TELEPHONE (OUTPATIENT)
Dept: CARDIAC SURGERY | Facility: CLINIC | Age: 60
End: 2025-03-13

## 2025-03-13 NOTE — TELEPHONE ENCOUNTER
Surgeon/Procedure/Date: 03/07/25  Post op appt: 03/26/25 at 9:00 am - Confirmed follow up appointment date and time      1. Diet: (full liquid diet for 4-5 days following discharge/transition to soft foods on day 5)           (continue to take PPI until follow up visit. Sit upright for 4 hours after eating, prior to going to bed)           (nausea, vomiting, difficulty or pain with swallowing?)    Patient reports starting to advance diet slowly.  Has been able to tolerate softer foods. Denies nausea/vomiting/difficulty or pain with swallowing    2. Constipation: (Yes: colace 100 bid, senokot 2 tabs qhs or senokot S 2 tabs qhs. No BM: Dulcolax/Miralax/suppository)    Patient denies    3. Pain Management: (Tylenol 650 mg q6 hrs, Oxycodone 5-10 mg q4-6 hrs prn, +/- Advil 600 mg TID prn)    Patient reports that pain is well controlled with tylenol and Oxycodone    4. Fever/Chills/Cough/Shortness of breath: (Call for temps over 100.4)    Denies fever/chills/cough/shortness of breath    5. Incisions: (Redness/warmth/drainage? Shower okay, no baths)    Patient reports incisions dry/intact.  Denies redness/warmth/drainage.    6. Activity: (Walking/stationary biking. No lifting over 10-15 lbs)     Patient reports walking with dog.  Reinforced lifting restriction.  Patient acknowledged understanding.

## 2025-03-25 ENCOUNTER — TELEPHONE (OUTPATIENT)
Age: 60
End: 2025-03-25

## 2025-03-25 DIAGNOSIS — G43.719 INTRACTABLE CHRONIC MIGRAINE WITHOUT AURA AND WITHOUT STATUS MIGRAINOSUS: ICD-10-CM

## 2025-03-25 RX ORDER — RIZATRIPTAN BENZOATE 5 MG/1
TABLET, ORALLY DISINTEGRATING ORAL
Qty: 9 TABLET | Refills: 1 | Status: SHIPPED | OUTPATIENT
Start: 2025-03-25 | End: 2025-03-25 | Stop reason: SDUPTHER

## 2025-03-25 RX ORDER — RIZATRIPTAN BENZOATE 5 MG/1
TABLET, ORALLY DISINTEGRATING ORAL
Qty: 9 TABLET | Refills: 1 | Status: SHIPPED | OUTPATIENT
Start: 2025-03-25

## 2025-03-25 RX ORDER — RIZATRIPTAN BENZOATE 5 MG/1
TABLET, ORALLY DISINTEGRATING ORAL
Qty: 9 TABLET | Refills: 1 | OUTPATIENT
Start: 2025-03-25

## 2025-03-25 NOTE — TELEPHONE ENCOUNTER
Patient is requesting a script for  rizatriptan (MAXALT-MLT) 5 mg disintegrating tablet be sent to [id number] - [place] - [address] [place] [address]. He mentioned the doctor who prescribed it to him has since retired and wants Dr Taylor to write a new script. Please advise back to patient to further assist.

## 2025-03-25 NOTE — PROGRESS NOTES
"Name: Desean Erickson      : 1965      MRN: [id number]  Encounter Provider: Petar Sloan DO  Encounter Date: 3/26/2025   Encounter department: [facility]  :  Assessment & Plan  Gastroesophageal reflux disease without esophagitis  60yM s/p rHHR w/ Rick. He is doing well after surgery. No GERD, some dysphagia.    He will stop his omeprazole. He will continue advancing his diet as tolerated.     I will see him back in ~6 weeks.              Thoracic History   Diagnosis: Hiatal hernia  Procedures/Surgeries: 3/7/25 rHHR w/ Rick fundoplication  Pathology: N/a  Adjuvant Therapy: N/a  Problem   Gastroesophageal Reflux Disease Without Esophagitis        History of Present Illness   HPI  Desean Erickson is a 60 y.o. male who presents for his first postop appointment. From my first consult note: \"Desean Erickson is a 60 y.o. male presenting for consultation regarding GERD, a hiatal hernia and Mustafa's. Medical history includes left inguinal hernia surgery, GERD, history of esophageal stricture. History of lower back surgery fusion and cervical fusion. Former smoker quit in 2016. He states he has been dealing with GERD since . He has pain with swallowing. Admits to some dysphagia. He does admit to waking up in the middle of the night with burning behind the chest and regurgitation requiring Tums.  Admits to a chronic cigarettes.\"    He went to OR on 3/7/25 for rHHR with Rick and BioA mesh implantation.     He has been taking his omeprazole. He stopped his omeprazole this AM. No Tums.     Diet: Eating some solid foods, mostly soups. He needs to take smaller bites. He does admit satiety.   GERD: No cough.  No reflux, no GERD pain.   Pain: No surgical pain.   Weight:  Down about 9lbs.      Data:  The following results contain my personal interpretation and summarization.   EGD (25): 4cm hiatal hernia. Hill Grade III. Mustafa's esophagus.   CTC (25): No lung " "nodules.   Path (1/21/25): Mustafa's changes.      Meds: Omeprazole 40qd, Tums frequently       Review of Systems   Constitutional:  Negative for chills, fatigue and fever.   HENT:  Positive for trouble swallowing. Negative for voice change.    Eyes:  Negative for photophobia and visual disturbance.   Respiratory:  Negative for cough, chest tightness and shortness of breath.    Cardiovascular:  Negative for chest pain and palpitations.   Gastrointestinal:  Negative for abdominal pain, nausea and vomiting.   Musculoskeletal:  Positive for back pain and gait problem.   Skin:  Negative for rash and wound.   Neurological:  Positive for headaches. Negative for dizziness and weakness.   All other systems reviewed and are negative.          Objective   /78 (BP Location: Left arm, Patient Position: Sitting, Cuff Size: Large)   Pulse 83   Temp 98.2 °F (36.8 °C) (Temporal)   Resp 16   Ht 6' 3\" (1.905 m)   Wt 89.4 kg (197 lb)   SpO2 94%   BMI 24.62 kg/m²     Pain Screening:  Pain Score:   2  ECOG    Physical Exam  Constitutional:       General: He is not in acute distress.     Appearance: Normal appearance. He is normal weight.   HENT:      Head: Normocephalic and atraumatic.   Cardiovascular:      Rate and Rhythm: Normal rate and regular rhythm.      Heart sounds: Normal heart sounds. No murmur heard.  Pulmonary:      Effort: No respiratory distress.      Breath sounds: Normal breath sounds.   Abdominal:      General: Abdomen is flat. There is no distension.      Palpations: Abdomen is soft.      Tenderness: There is no abdominal tenderness.   Musculoskeletal:      Right lower leg: No edema.      Left lower leg: No edema.   Skin:     Comments: Healing abdominal incisions.    Neurological:      General: No focal deficit present.      Mental Status: He is alert and oriented to person, place, and time.   Psychiatric:         Mood and Affect: Mood normal.         Behavior: Behavior normal.         Thought Content: " Thought content normal.         Judgment: Judgment normal.         Labs:       Pathology: I have reviewed pathology reports described above.

## 2025-03-26 ENCOUNTER — OFFICE VISIT (OUTPATIENT)
Dept: CARDIAC SURGERY | Facility: HOSPITAL | Age: 60
End: 2025-03-26

## 2025-03-26 VITALS
WEIGHT: 197 LBS | DIASTOLIC BLOOD PRESSURE: 78 MMHG | HEART RATE: 83 BPM | HEIGHT: 75 IN | TEMPERATURE: 98.2 F | SYSTOLIC BLOOD PRESSURE: 120 MMHG | RESPIRATION RATE: 16 BRPM | OXYGEN SATURATION: 94 % | BODY MASS INDEX: 24.49 KG/M2

## 2025-03-26 DIAGNOSIS — K21.9 GASTROESOPHAGEAL REFLUX DISEASE WITHOUT ESOPHAGITIS: Primary | Chronic | ICD-10-CM

## 2025-03-26 PROCEDURE — 99024 POSTOP FOLLOW-UP VISIT: CPT | Performed by: SURGERY

## 2025-03-26 NOTE — ASSESSMENT & PLAN NOTE
60yM s/p rHHR w/ Rick. He is doing well after surgery. No GERD, some dysphagia.    He will stop his omeprazole. He will continue advancing his diet as tolerated.     I will see him back in ~6 weeks.

## 2025-04-10 ENCOUNTER — TELEPHONE (OUTPATIENT)
Age: 60
End: 2025-04-10

## 2025-04-10 ENCOUNTER — OFFICE VISIT (OUTPATIENT)
Dept: FAMILY MEDICINE CLINIC | Facility: CLINIC | Age: 60
End: 2025-04-10
Payer: COMMERCIAL

## 2025-04-10 VITALS
BODY MASS INDEX: 24.62 KG/M2 | SYSTOLIC BLOOD PRESSURE: 120 MMHG | TEMPERATURE: 96.4 F | HEART RATE: 67 BPM | HEIGHT: 75 IN | WEIGHT: 198 LBS | DIASTOLIC BLOOD PRESSURE: 78 MMHG | OXYGEN SATURATION: 98 %

## 2025-04-10 DIAGNOSIS — F32.2 SEVERE MAJOR DEPRESSIVE DISORDER (HCC): ICD-10-CM

## 2025-04-10 DIAGNOSIS — R42 VERTIGO: ICD-10-CM

## 2025-04-10 DIAGNOSIS — F11.20 UNCOMPLICATED OPIOID DEPENDENCE (HCC): ICD-10-CM

## 2025-04-10 DIAGNOSIS — A69.20 LYME DISEASE: Primary | ICD-10-CM

## 2025-04-10 PROCEDURE — 99213 OFFICE O/P EST LOW 20 MIN: CPT

## 2025-04-10 RX ORDER — AZITHROMYCIN 250 MG/1
500 TABLET, FILM COATED ORAL EVERY 24 HOURS
Qty: 14 TABLET | Refills: 0 | Status: SHIPPED | OUTPATIENT
Start: 2025-04-10 | End: 2025-04-17

## 2025-04-10 RX ORDER — MECLIZINE HYDROCHLORIDE 25 MG/1
25 TABLET ORAL EVERY 8 HOURS PRN
Qty: 30 TABLET | Refills: 0 | Status: SHIPPED | OUTPATIENT
Start: 2025-04-10

## 2025-04-10 RX ORDER — ZINC SULFATE 50(220)MG
220 CAPSULE ORAL DAILY
COMMUNITY

## 2025-04-10 NOTE — TELEPHONE ENCOUNTER
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

## 2025-04-10 NOTE — PROGRESS NOTES
Name: Desean Erickson      : 1965      MRN: [id number]  Encounter Provider: Joy Kiser DO  Encounter Date: 4/10/2025   Encounter department: [facility]  :  Assessment & Plan  Lyme disease  -will treat with zpack 500mg QD x7 days as patient does not want to take doxycycline due to upset stomach   Orders:    azithromycin (ZITHROMAX) 250 mg tablet; Take 2 tablets (500 mg total) by mouth every 24 hours for 7 days    Vertigo  -positive anuj hallpike, neuro exam otherwise unremarkable. Pt has been diagnosed with vertigo in the past     Plan:  Meclizine 25mg q8h prn dizziness, consider vestibular physical therapy if no improvement with meclizine   ED precautions for signs/sx of stroke reviewed  Orders:    meclizine (ANTIVERT) 25 mg tablet; Take 1 tablet (25 mg total) by mouth every 8 (eight) hours as needed for dizziness    Severe major depressive disorder (HCC)  Depression Screening Follow-up Plan: Patient's depression screening was positive with a PHQ-9 score of 6. Patient assessed for underlying major depression. They have no active suicidal ideations. Brief counseling provided and recommend additional follow-up/re-evaluation next office visit.         Uncomplicated opioid dependence (HCC)               Depression Screening and Follow-up Plan: Patient's depression screening was positive with a PHQ-9 score of 6.   Patient assessed for underlying major depression. Brief counseling provided and recommend additional follow-up/re-evaluation next office visit.       History of Present Illness   Patient presents for acute visit; pulled a tick off of his left shoulder a few days ago. Sent the tick out for outside testing, which came back positive for B.Burgdorferi Has been treated for lyme in the past but the doxycycline upset his stomach, so he does not want to take it again. He has not noticed any bullseye rashes, but there is redness and swelling surrounding the site of the bite. Thinks tick  "was on him for 12-24 hours.     Also complains of dizziness with certain head movements, especially when he turns in bed at night. Feels like \"room spinning\" sensation, he has been diagnosed with vertigo in the past but does not take anything for it. No blurry vision, headache, numbness, weakness, facial droop, or slurred speech.      Review of Systems   Constitutional:  Negative for chills and fever.   HENT:  Negative for ear pain and sore throat.    Eyes:  Negative for pain and visual disturbance.   Respiratory:  Negative for cough and shortness of breath.    Cardiovascular:  Negative for chest pain and palpitations.   Gastrointestinal:  Negative for abdominal pain and vomiting.   Genitourinary:  Negative for dysuria and hematuria.   Musculoskeletal:  Negative for arthralgias and back pain.   Skin:  Positive for wound. Negative for color change and rash.   Neurological:  Positive for dizziness. Negative for seizures and syncope.   All other systems reviewed and are negative.      Objective   /78 (BP Location: Left arm, Patient Position: Sitting, Cuff Size: Adult)   Pulse 67   Temp (!) 96.4 °F (35.8 °C) (Tympanic)   Ht 6' 3\" (1.905 m)   Wt 89.8 kg (198 lb)   SpO2 98%   BMI 24.75 kg/m²      Physical Exam  Constitutional:       General: He is not in acute distress.     Appearance: Normal appearance. He is not ill-appearing or toxic-appearing.   HENT:      Head: Normocephalic and atraumatic.      Right Ear: External ear normal.      Left Ear: External ear normal.      Nose: Nose normal.   Eyes:      Conjunctiva/sclera: Conjunctivae normal.   Cardiovascular:      Rate and Rhythm: Normal rate and regular rhythm.      Heart sounds: Normal heart sounds. No murmur heard.  Pulmonary:      Effort: No respiratory distress.      Breath sounds: Normal breath sounds. No wheezing, rhonchi or rales.   Skin:     General: Skin is warm and dry.      Findings: Lesion (tick bite with minimal surrounding erythema on left " posterior shoulder) present.   Neurological:      General: No focal deficit present.      Mental Status: He is alert and oriented to person, place, and time.      Cranial Nerves: Cranial nerves 2-12 are intact. No cranial nerve deficit, dysarthria or facial asymmetry.      Motor: Motor function is intact.      Coordination: Coordination is intact.      Gait: Gait is intact.      Comments: Positive anuj royal    Psychiatric:         Mood and Affect: Mood normal.         Behavior: Behavior normal.

## 2025-04-14 ENCOUNTER — TELEPHONE (OUTPATIENT)
Age: 60
End: 2025-04-14

## 2025-04-14 DIAGNOSIS — R42 VERTIGO: Primary | ICD-10-CM

## 2025-04-14 NOTE — TELEPHONE ENCOUNTER
Patient is requesting a script for him to have therapy for alignment of the crystals in his head - he stated it is for his vertigo. Please inform him when ready as he would like to pick it up.

## 2025-04-21 NOTE — PROGRESS NOTES
PT Evaluation     Today's date: 2025  Patient name: Desean Erickson  : 1965  MRN: [id number]  Referring provider: Joy Kiser DO  Dx:   Encounter Diagnosis     ICD-10-CM    1. Vertigo  R42 Ambulatory Referral to Physical Therapy      2. Dizziness  R42       3. Dizziness and giddiness  R42       4. Benign paroxysmal positional vertigo of right ear  H81.11                      Assessment  Impairments: abnormal coordination, abnormal gait, abnormal muscle firing, abnormal muscle tone, abnormal or restricted ROM, abnormal movement, activity intolerance, difficulty understanding, impaired balance, impaired physical strength, lacks appropriate home exercise program, pain with function, scapular dyskinesis, poor posture  and endurance  Symptom irritability: moderate    Assessment details: Desean Erickson is a 60 y.o. male presenting to outpatient physical therapy at [facility] with complaints of recent onset of vertigo and dizziness.  He presents with decreased range of motion, decreased strength, limited flexibility, poor postural awareness, poor body mechanics, altered gait pattern, poor balance, decreased tolerance to activity and decreased functional mobility due to Vertigo  (primary encounter diagnosis), Dizziness, Dizziness and giddiness, Benign paroxysmal positional vertigo of right ear. Therapist discussed diagnosis, prognosis, POC, proper responses to treatment.   He would benefit from skilled PT services in order to address these deficits and reach maximum level of function.  Thank you for the referral!   Understanding of Dx/Px/POC: good     Prognosis: good    Goals  STGs (in 4 weeks):  1. Pt will report having at least a 50% improvement since I.E.   2. Pt will report having at most a 2/10 sxs with functional mobility.   3. Pt will perform saccades without onset of sxs.   4. Pt will perform VOR 1 statically for at least 60 seconds without onset of sxs.   5. Pt will be negative for  [place]-Hallpike.     LTGs (in 12 weeks):  1. Pt will report having at least a 75% improvement since I.E.   2. Pt will be independent with HEP.   3. Pt will be able to turn, tilt, and rotate neck without onset of sxs.   4. Pt will be able to roll on to his sides for bed mobility without onset of sxs.   5. Pt will be able to perform supine to sitting transfers without onset of sxs.   6. Pt will be able to amb  with increased ease.     Plan  Patient would benefit from: PT eval and skilled physical therapy  Planned modality interventions: TENS and unattended electrical stimulation    Planned therapy interventions: activity modification, balance, joint mobilization, massage, neuromuscular re-education, patient/caregiver education, self care, strengthening, stretching, therapeutic activities, therapeutic exercise, home exercise program, flexibility and canalith repositioning    Frequency: 2x week  Plan of Care beginning date: 2025  Plan of Care expiration date: 2025  Treatment plan discussed with: patient    Subjective Evaluation    History of Present Illness  Mechanism of injury: Pt is a 60 year old male who presents with chief complaints of dizziness that has been going on for two weeks. He reports that it came on the one night when he got up out of bed and made him fall over. He has since been prescribed Meclizine which does help decrease sxs however he feels tried. He reports that he has had this before in the past but not as bad. He reports that he had a lot of ear wax taken out of his L ear. Now referred to skilled OP PT.     PMHx: low back pain  Quality of life: good    Patient Goals  Patient goals for therapy: improved balance, increased motion, increased strength, independence with ADLs/IADLs and return to sport/leisure activities    Pain  No pain reported  Current pain rating: 10  At best pain ratin  At worst pain ratin  Exacerbated by: lying down to getting up, rolling on to the right side,  looking down and looking up, bending over, waking up in the AM and in the middle of the night if he has to get up.  Progression: worsening    Treatments  Current treatment: medication    Objective      Observation/Posture: slight FHP, rounded shoulders, scap elevated and protracted    Cervical Screen  (*=pain)  (**=symptom provocation)    AROM: (deg)  Flexion: 30   Extension: 60   R Rot: 70  L Rot: 60  R Lat Flex: 40  L Lat Flex: 45    Palpation/Tenderness: no TTPR    Joint Mobility: decreased PA glides cervical    Strength: (MMT)    R  L  S' Flex  3+/5  3+/5  S' ABD  3/5  3/5        Visual-Occular and Vestibular Testing:  Smooth pursuits: negative  Convergence: positive  Saccades: positive   VORx1: 10 seconds  [place]-Hallpike: (R) positive (L) negative       Cognitive Testing   NA    Special Tests  Vertebral Artery: (R) negative (L) negative  Sharp Jeff test: negative        Daily Treatment Diary     Precautions: C2 fusion, hx of LBP    FOTO Completed On: NA    POC Expires Reeval for Medicare to be completed  Unit Limit Auth Expiration Date PT/OT/STVisit Limit   7/14/2025 By visit NA 4 12/3/51373 12/31/2025    Completed on visit: INDU                   Auth Status DATE 4/22        Approved Visit # 1         Remaining NA        MANUAL THERAPY                                                               THERAPEUTIC EXERCISE HEP         Epley  (R) x 1        Mod Epley  (R) x 1                                                                                                                                          NEUROMUSCULAR REEDUCATION                                                                                                                                                       THERAPEUTIC ACTIVITY                                                  GAIT TRAINING                                                  MODALITIES

## 2025-04-22 ENCOUNTER — EVALUATION (OUTPATIENT)
Dept: PHYSICAL THERAPY | Facility: CLINIC | Age: 60
End: 2025-04-22
Payer: COMMERCIAL

## 2025-04-22 DIAGNOSIS — R42 VERTIGO: Primary | ICD-10-CM

## 2025-04-22 DIAGNOSIS — H81.11 BENIGN PAROXYSMAL POSITIONAL VERTIGO OF RIGHT EAR: ICD-10-CM

## 2025-04-22 DIAGNOSIS — R42 DIZZINESS: ICD-10-CM

## 2025-04-22 DIAGNOSIS — R42 DIZZINESS AND GIDDINESS: ICD-10-CM

## 2025-04-22 PROCEDURE — 97164 PT RE-EVAL EST PLAN CARE: CPT | Performed by: PHYSICAL THERAPIST

## 2025-04-22 PROCEDURE — 97110 THERAPEUTIC EXERCISES: CPT | Performed by: PHYSICAL THERAPIST

## 2025-04-25 ENCOUNTER — OFFICE VISIT (OUTPATIENT)
Dept: PHYSICAL THERAPY | Facility: CLINIC | Age: 60
End: 2025-04-25
Payer: COMMERCIAL

## 2025-04-25 DIAGNOSIS — R42 DIZZINESS: ICD-10-CM

## 2025-04-25 DIAGNOSIS — H81.11 BENIGN PAROXYSMAL POSITIONAL VERTIGO OF RIGHT EAR: ICD-10-CM

## 2025-04-25 DIAGNOSIS — R42 DIZZINESS AND GIDDINESS: ICD-10-CM

## 2025-04-25 DIAGNOSIS — R42 VERTIGO: Primary | ICD-10-CM

## 2025-04-25 PROCEDURE — 97140 MANUAL THERAPY 1/> REGIONS: CPT | Performed by: PHYSICAL THERAPIST

## 2025-04-25 PROCEDURE — 97112 NEUROMUSCULAR REEDUCATION: CPT | Performed by: PHYSICAL THERAPIST

## 2025-04-25 PROCEDURE — 97110 THERAPEUTIC EXERCISES: CPT | Performed by: PHYSICAL THERAPIST

## 2025-04-25 NOTE — PROGRESS NOTES
Daily Note     Today's date: 2025  Patient name: Desean Erickson  : 1965  MRN: [id number]  Referring provider: Joy Kiser DO  Dx:   Encounter Diagnosis     ICD-10-CM    1. Vertigo  R42       2. Dizziness  R42       3. Dizziness and giddiness  R42       4. Benign paroxysmal positional vertigo of right ear  H81.11                      Subjective: He reports feeling a little off balance and a little off today when he moves his neck and transitions with sit to and from standing.       Objective: See treatment diary below      Assessment: Tolerated treatment well; initiated POC with testing his canals which he was negative today for [place]-Hallpike bilaterally and roll test. MT performed after receiving consent from pt; TPR/STM to B UT, LS, cervical paraspinals L worse R and SOR. MH performed to neck in supine with chin tucks. Vcs provided on proper; initiated exercises to improve posture.    Patient demonstrated fatigue post treatment and would benefit from continued PT      Plan: Continue per plan of care.      Observation/Posture: slight FHP, rounded shoulders, scap elevated and protracted    Cervical Screen  (*=pain)  (**=symptom provocation)    AROM: (deg)  Flexion: 30   Extension: 60   R Rot: 70  L Rot: 60  R Lat Flex: 40  L Lat Flex: 45    Palpation/Tenderness: no TTPR    Joint Mobility: decreased PA glides cervical    Strength: (MMT)    R  L  S' Flex  3+/5  3+/5  S' ABD  3/5  3/5        Visual-Occular and Vestibular Testing:  Smooth pursuits: negative  Convergence: positive  Saccades: positive   VORx1: 10 seconds  Hom[place]-Hallpike: (R) positive (L) negative       Cognitive Testing   NA    Special Tests  Vertebral Artery: (R) negative (L) negative  Sharp Jeff test: negative        Daily Treatment Diary     Precautions: C2 fusion, hx of LBP    FOTO Completed On: NA    POC Expires Reeval for Medicare to be completed  Unit Limit Auth Expiration Date PT/OT/STVisit Limit   2025 By visit NA 4  12/3/04907 12/31/2025    Completed on visit: NA                   Auth Status DATE 4/22 4/25       Approved Visit # 1 2        Remaining NA NA       MANUAL THERAPY         STM/TPR B UT, LS, cervical paraspinals  SMF ea (L worse R)       SOR  SMF       B UT and B LS stretch  SMF ea                                  THERAPEUTIC EXERCISE HEP         Epley  (R) x 1 Negative       Mod Epley  (R) x 1                            Chin tucks c MH in supine   5 sec x 10                 Seated Chin tucks    2 sec x 6 (* inc sxs)       Seated Scap Retraction   2 sec x 10       SNAGs Extension          B UT stretch          B LS stretch                                                                      NEUROMUSCULAR REEDUCATION                                                                                                                                                       THERAPEUTIC ACTIVITY                                                  GAIT TRAINING                                                  MODALITIES         MH  5 mins with chin tucks

## 2025-04-30 ENCOUNTER — OFFICE VISIT (OUTPATIENT)
Dept: PHYSICAL THERAPY | Facility: CLINIC | Age: 60
End: 2025-04-30
Payer: COMMERCIAL

## 2025-04-30 DIAGNOSIS — R42 VERTIGO: Primary | ICD-10-CM

## 2025-04-30 DIAGNOSIS — R42 DIZZINESS AND GIDDINESS: ICD-10-CM

## 2025-04-30 DIAGNOSIS — R42 DIZZINESS: ICD-10-CM

## 2025-04-30 DIAGNOSIS — H81.11 BENIGN PAROXYSMAL POSITIONAL VERTIGO OF RIGHT EAR: ICD-10-CM

## 2025-04-30 PROCEDURE — 97140 MANUAL THERAPY 1/> REGIONS: CPT | Performed by: PHYSICAL THERAPIST

## 2025-04-30 PROCEDURE — 97110 THERAPEUTIC EXERCISES: CPT | Performed by: PHYSICAL THERAPIST

## 2025-04-30 NOTE — PROGRESS NOTES
Daily Note     Today's date: 2025  Patient name: Desean Erickson  : 1965  MRN: [id number]  Referring provider: Joy Kiser DO  Dx:   Encounter Diagnosis     ICD-10-CM    1. Vertigo  R42       2. Dizziness  R42       3. Dizziness and giddiness  R42       4. Benign paroxysmal positional vertigo of right ear  H81.11                      Subjective: He reports that he was lying in the hammock and when his dog jumped up it fell with his leg first so he did not hit his head but he landed on his tail bone.       Objective: See treatment diary below      Assessment: Tolerated treatment well; initiated POC with MT after receiving consent from pt; he has increased tone along B UT, LS, scalenes, SCM, and cervical paraspinals. He demonstrates improved tissue mobility post MT. He denies having increased pain throughout session. Overall, he demonstrates improved CROM as well as decreased vertigo and dizziness. He was instructed to contact MD if pain continues.  Patient demonstrated fatigue post treatment and would benefit from continued PT      Plan: Continue per plan of care.      Observation/Posture: slight FHP, rounded shoulders, scap elevated and protracted    Cervical Screen  (*=pain)  (**=symptom provocation)    AROM: (deg)  Flexion: 30   Extension: 60   R Rot: 70  L Rot: 60  R Lat Flex: 40  L Lat Flex: 45    Palpation/Tenderness: no TTPR    Joint Mobility: decreased PA glides cervical    Strength: (MMT)    R  L  S' Flex  3+/5  3+/5  S' ABD  3/5  3/5        Visual-Occular and Vestibular Testing:  Smooth pursuits: negative  Convergence: positive  Saccades: positive   VORx1: 10 seconds  [place]-Hallpike: (R) positive (L) negative       Cognitive Testing   NA    Special Tests  Vertebral Artery: (R) negative (L) negative  Sharp Jeff test: negative        Daily Treatment Diary     Precautions: C2 fusion, hx of LBP    FOTO Completed On: NA    POC Expires Reeval for Medicare to be completed  Unit Limit Auth  Expiration Date PT/OT/STVisit Limit   7/14/2025 By visit NA 4 12/3/22892 12/31/2025    Completed on visit: NA                   Auth Status DATE 4/22 4/25 4/30      Approved Visit # 1 2 3       Remaining NA NA NA      MANUAL THERAPY         STM/TPR B UT, LS, cervical paraspinals  SMF ea (L worse R) SMF ea (L worse than R)      SOR  SMF SMF       STM/TPR scalenes, SCM   SMF ea               B UT and B LS stretch  SMF ea                                  THERAPEUTIC EXERCISE HEP         Epley  (R) x 1 Negative       Mod Epley  (R) x 1                            Chin tucks c MH in supine   5 sec x 10 5 sec x 10                Seated Chin tucks    2 sec x 6 (* inc sxs) 2 sec x 5       Seated Scap Retraction   2 sec x 10 2 sec x 10      SNAGs Extension    2 sec x 10      B UT stretch          B LS stretch                                                                      NEUROMUSCULAR REEDUCATION                                                                                                                                                                 THERAPEUTIC ACTIVITY                                                  GAIT TRAINING                                                  MODALITIES         MH  5 mins with chin tucks 10 mins with chin tucks

## 2025-05-08 ENCOUNTER — OFFICE VISIT (OUTPATIENT)
Dept: PHYSICAL THERAPY | Facility: CLINIC | Age: 60
End: 2025-05-08
Payer: COMMERCIAL

## 2025-05-08 DIAGNOSIS — H81.11 BENIGN PAROXYSMAL POSITIONAL VERTIGO OF RIGHT EAR: ICD-10-CM

## 2025-05-08 DIAGNOSIS — R42 DIZZINESS AND GIDDINESS: ICD-10-CM

## 2025-05-08 DIAGNOSIS — R42 VERTIGO: ICD-10-CM

## 2025-05-08 DIAGNOSIS — R42 DIZZINESS: Primary | ICD-10-CM

## 2025-05-08 PROCEDURE — 97110 THERAPEUTIC EXERCISES: CPT | Performed by: PHYSICAL THERAPIST

## 2025-05-08 PROCEDURE — 97140 MANUAL THERAPY 1/> REGIONS: CPT | Performed by: PHYSICAL THERAPIST

## 2025-05-08 NOTE — PROGRESS NOTES
Daily Note     Today's date: 2025  Patient name: Desean Erickson  : 1965  MRN: [id number]  Referring provider: Joy Kiser DO  Dx:   Encounter Diagnosis     ICD-10-CM    1. Dizziness  R42       2. Dizziness and giddiness  R42       3. Benign paroxysmal positional vertigo of right ear  H81.11       4. Vertigo  R42                      Subjective: He reports that he has been feeling good except that he occasionally has spinning when he gets up from sitting or lying.       Objective: See treatment diary below      Assessment: Tolerated treatment well; initiated POC with MT after receiving consent from pt. He has increased tone with B UT and B LS and along cervical paraspinals which improves post STM/TPR. He was positive for both R and L canalithiasis. He reports feeling fine post session without increased sxs. Discussed proper responses to session and instructed to contact therapist with any questions. He has a follow up with therapy in approximately 1.5 weeks. Patient demonstrated fatigue post treatment and would benefit from continued PT      Plan: Continue per plan of care.      Observation/Posture: slight FHP, rounded shoulders, scap elevated and protracted    Cervical Screen  (*=pain)  (**=symptom provocation)    AROM: (deg)  Flexion: 30   Extension: 60   R Rot: 70  L Rot: 60  R Lat Flex: 40  L Lat Flex: 45    Palpation/Tenderness: no TTPR    Joint Mobility: decreased PA glides cervical    Strength: (MMT)    R  L  S' Flex  3+/5  3+/5  S' ABD  3/5  3/5        Visual-Occular and Vestibular Testing:  Smooth pursuits: negative  Convergence: positive  Saccades: positive   VORx1: 10 seconds  [place]-Hallpike: (R) positive (L) negative       Cognitive Testing   NA    Special Tests  Vertebral Artery: (R) negative (L) negative  Sharp Jeff test: negative        Daily Treatment Diary     Precautions: C2 fusion, hx of LBP    FOTO Completed On: NA    POC Expires Reeval for Medicare to be completed  Unit Limit  Auth Expiration Date PT/OT/STVisit Limit   7/14/2025 By visit NA 4 12/3/65714 12/31/2025    Completed on visit: NA                   Auth Status DATE 4/22 4/25 4/30 5/8     Approved Visit # 1 2 3 4      Remaining NA NA NA NA     MANUAL THERAPY         STM/TPR B UT, LS, cervical paraspinals  SMF ea (L worse R) SMF ea (L worse than R) SMF ea     SOR  SMF SMF  SMF     STM/TPR scalenes, SCM   SMF ea SMF ea              B UT and B LS stretch  SMF ea  SMF LS stretch                                THERAPEUTIC EXERCISE HEP         Epley  (R) x 1 Negative  (R) x 1  (L) x 1     Mod Epley  (R) x 1                            Chin tucks c MH in supine   5 sec x 10 5 sec x 10 5 sec x 10               Seated Chin tucks    2 sec x 6 (* inc sxs) 2 sec x 5  NV?     Seated Scap Retraction   2 sec x 10 2 sec x 10 NV?     SNAGs Extension    2 sec x 10 NV?     B UT stretch     NV?     B LS stretch     NV?                                                                 NEUROMUSCULAR REEDUCATION                                                                                                                                                                 THERAPEUTIC ACTIVITY                                                  GAIT TRAINING                                                  MODALITIES         MH  5 mins with chin tucks 10 mins with chin tucks

## 2025-05-12 ENCOUNTER — TELEPHONE (OUTPATIENT)
Age: 60
End: 2025-05-12

## 2025-05-12 DIAGNOSIS — I10 BENIGN ESSENTIAL HYPERTENSION: ICD-10-CM

## 2025-05-12 RX ORDER — OLMESARTAN MEDOXOMIL 5 MG/1
5 TABLET ORAL DAILY
Qty: 90 TABLET | Refills: 1 | Status: SHIPPED | OUTPATIENT
Start: 2025-05-12

## 2025-05-12 NOTE — ASSESSMENT & PLAN NOTE
60yM s/p rHHR w/ Rick. He is doing great after surgery. No GERD, no dysphagia. Remains off PPI.     Return 6 months after surgery.

## 2025-05-12 NOTE — TELEPHONE ENCOUNTER
Patient states he put a refill request for   olmesartan (BENICAR) 5 mg tablet a week or so ago and his pharmacy had called the office to discuss. I do not see a refill request for this medication.    Medication: olmesartan (BENICAR) 5 mg tablet     Dose/Frequency: take 1 tablet by mouth once daily    Quantity: 90 tabs    Pharmacy:   [id number] - [id number] [phone]       Office:   [x] PCP/Provider - Keerthi Taylor, DO   [] Speciality/Provider -     Does the patient have enough for 3 days?   [x] Yes   [] No - Send as HP to POD

## 2025-05-12 NOTE — TELEPHONE ENCOUNTER
Patient is coming in a few mins to  copies of his Lipid panel and lyme lab orders. Clerical not avail for call.

## 2025-05-12 NOTE — PROGRESS NOTES
"Name: Desean Erickson      : 1965      MRN: [id number]  Encounter Provider: Petar Sloan DO  Encounter Date: 2025   Encounter department: [facility]  :  Assessment & Plan  Gastroesophageal reflux disease without esophagitis  60yM s/p rHHR w/ Rick. He is doing great after surgery. No GERD, no dysphagia. Remains off PPI.     Return 6 months after surgery.              Thoracic History     Problem   Gastroesophageal Reflux Disease Without Esophagitis        History of Present Illness   HPI  Desean Erickson is a 60 y.o. male  who presents for his second postop appointment.       From my first consult note: \"Desean Erickson is a 60 y.o. male presenting for consultation regarding GERD, a hiatal hernia and Mustafa's. Medical history includes left inguinal hernia surgery,   GERD, history of esophageal stricture. History of lower back surgery fusion and cervical fusion. Former smoker quit in 2016. He states he has been dealing with GERD since . He has pain with swallowing. Admits to some dysphagia. He does admit to waking up in the middle of the night with burning behind the chest and regurgitation requiring Tums.  Admits to a chronic cigarettes.\"     He went to OR on 3/7/25 for rHHR with Rick and BioA mesh implantation.      Last appointment 3/26/25:  Diet: Eating some solid foods, mostly soups. He needs to take smaller bites. He does admit satiety.   GERD: No cough.  No reflux, no GERD pain.   Pain: No surgical pain.   Weight:  Down about 9lbs.     At the last appointment \"He had been taking his omeprazole. He stopped his omeprazole this AM. No Tums.\"     Today:  States he enjoys eating again. Ate pizza and a cheesesteak without issues.   Still denies cough, no longer bilious reflux. No GERD symptoms.   He remains off PPI.      Data:  The following results contain my personal interpretation and summarization.   EGD (25): 4cm hiatal hernia. Hill Grade III. " "Mustafa's esophagus.   CTC (1/12/25): No lung nodules.   Path (1/21/25): Mustafa's changes.        Review of Systems   Constitutional:  Negative for chills and fever.   HENT:  Negative for trouble swallowing and voice change.    Eyes:  Negative for photophobia and visual disturbance.   Respiratory:  Negative for chest tightness and shortness of breath.    Cardiovascular:  Negative for chest pain and palpitations.   Gastrointestinal:  Negative for abdominal pain, nausea and vomiting.   Musculoskeletal:  Negative for back pain and gait problem.   Skin:  Negative for rash and wound.   Neurological:  Negative for dizziness, weakness and light-headedness.   All other systems reviewed and are negative.          Objective   /81 (BP Location: Left arm, Patient Position: Sitting, Cuff Size: Adult)   Pulse 84   Temp 97.8 °F (36.6 °C) (Temporal)   Resp 17   Ht 6' 3\" (1.905 m)   Wt 91.2 kg (201 lb)   SpO2 96%   BMI 25.12 kg/m²     Pain Screening:     ECOG    Physical Exam  Vitals reviewed.   Constitutional:       General: He is not in acute distress.     Appearance: Normal appearance.   HENT:      Head: Normocephalic and atraumatic.     Cardiovascular:      Rate and Rhythm: Normal rate and regular rhythm.      Pulses: Normal pulses.      Heart sounds: Normal heart sounds.   Pulmonary:      Effort: Pulmonary effort is normal. No respiratory distress.      Breath sounds: Normal breath sounds.   Abdominal:      General: Abdomen is flat.      Palpations: Abdomen is soft.      Tenderness: There is no abdominal tenderness.     Musculoskeletal:      Cervical back: Neck supple.      Right lower leg: No edema.      Left lower leg: No edema.   Lymphadenopathy:      Cervical: No cervical adenopathy.     Skin:     General: Skin is warm.      Comments: Well-healed surgical incisions.      Neurological:      General: No focal deficit present.      Mental Status: He is alert and oriented to person, place, and time. Mental status " is at baseline.     Psychiatric:         Mood and Affect: Mood normal.         Behavior: Behavior normal.         Thought Content: Thought content normal.         Judgment: Judgment normal.         Labs:       Pathology: I have reviewed pathology reports described above.

## 2025-05-14 ENCOUNTER — OFFICE VISIT (OUTPATIENT)
Dept: CARDIAC SURGERY | Facility: HOSPITAL | Age: 60
End: 2025-05-14
Payer: COMMERCIAL

## 2025-05-14 VITALS
HEART RATE: 84 BPM | OXYGEN SATURATION: 96 % | WEIGHT: 201 LBS | DIASTOLIC BLOOD PRESSURE: 81 MMHG | RESPIRATION RATE: 17 BRPM | HEIGHT: 75 IN | SYSTOLIC BLOOD PRESSURE: 148 MMHG | BODY MASS INDEX: 24.99 KG/M2 | TEMPERATURE: 97.8 F

## 2025-05-14 DIAGNOSIS — K21.9 GASTROESOPHAGEAL REFLUX DISEASE WITHOUT ESOPHAGITIS: Primary | Chronic | ICD-10-CM

## 2025-05-14 PROCEDURE — 99213 OFFICE O/P EST LOW 20 MIN: CPT | Performed by: SURGERY

## 2025-05-21 ENCOUNTER — APPOINTMENT (OUTPATIENT)
Dept: PHYSICAL THERAPY | Facility: CLINIC | Age: 60
End: 2025-05-21
Payer: COMMERCIAL

## 2025-05-26 DIAGNOSIS — J30.2 SEASONAL ALLERGIC RHINITIS, UNSPECIFIED TRIGGER: ICD-10-CM

## 2025-05-27 RX ORDER — MONTELUKAST SODIUM 10 MG/1
10 TABLET ORAL
Qty: 90 TABLET | Refills: 1 | Status: SHIPPED | OUTPATIENT
Start: 2025-05-27

## 2025-05-29 ENCOUNTER — APPOINTMENT (OUTPATIENT)
Dept: PHYSICAL THERAPY | Facility: CLINIC | Age: 60
End: 2025-05-29
Payer: COMMERCIAL

## 2025-05-29 ENCOUNTER — PROCEDURE VISIT (OUTPATIENT)
Dept: PAIN MEDICINE | Facility: CLINIC | Age: 60
End: 2025-05-29
Payer: COMMERCIAL

## 2025-05-29 VITALS — BODY MASS INDEX: 24.87 KG/M2 | HEIGHT: 75 IN | WEIGHT: 200 LBS

## 2025-05-29 DIAGNOSIS — M79.18 MYOFASCIAL PAIN SYNDROME: ICD-10-CM

## 2025-05-29 DIAGNOSIS — G57.92 ILIOINGUINAL NEURALGIA OF LEFT SIDE: Primary | ICD-10-CM

## 2025-05-29 PROCEDURE — 76942 ECHO GUIDE FOR BIOPSY: CPT | Performed by: ANESTHESIOLOGY

## 2025-05-29 PROCEDURE — 64425 NJX AA&/STRD II IH NERVES: CPT | Performed by: ANESTHESIOLOGY

## 2025-05-29 RX ORDER — ROPIVACAINE HYDROCHLORIDE 2 MG/ML
20 INJECTION, SOLUTION EPIDURAL; INFILTRATION; PERINEURAL ONCE
Status: COMPLETED | OUTPATIENT
Start: 2025-05-29 | End: 2025-05-29

## 2025-05-29 RX ORDER — METHYLPREDNISOLONE ACETATE 40 MG/ML
40 INJECTION, SUSPENSION INTRA-ARTICULAR; INTRALESIONAL; INTRAMUSCULAR; SOFT TISSUE ONCE
Status: COMPLETED | OUTPATIENT
Start: 2025-05-29 | End: 2025-05-29

## 2025-05-29 RX ADMIN — ROPIVACAINE HYDROCHLORIDE 20 ML: 2 INJECTION, SOLUTION EPIDURAL; INFILTRATION; PERINEURAL at 13:35

## 2025-05-29 RX ADMIN — METHYLPREDNISOLONE ACETATE 40 MG: 40 INJECTION, SUSPENSION INTRA-ARTICULAR; INTRALESIONAL; INTRAMUSCULAR; SOFT TISSUE at 13:35

## 2025-05-29 NOTE — PROGRESS NOTES
"Nerve block    Date/Time: 5/29/2025 1:37 PM    Performed by: Jerry Silverman DO  Authorized by: Jerry Silverman DO    Patient location:  [facility] Protocol:  procedure performed by consultantConsent: Verbal consent obtained. Written consent obtained  Risks and benefits: risks, benefits and alternatives were discussed  Consent given by: patient  Time out: Immediately prior to procedure a \"time out\" was called to verify the correct patient, procedure, equipment, support staff and site/side marked as required.  Timeout called at: 5/29/2025 1:37 PM.  Patient understanding: patient states understanding of the procedure being performed  Patient consent: the patient's understanding of the procedure matches consent given  Procedure consent: procedure consent matches procedure scheduled  Site marked: the operative site was marked  Required items: required blood products, implants, devices, and special equipment available  Patient identity confirmed: verbally with patient    Indications:     Indications:  Pain relief  Location:     Body area:  Trunk    Trunk nerve: ilioinguinal      Nerve type:  Peripheral    Laterality:  Left  Pre-procedure details:     Skin preparation:  2% chlorhexidine  Procedure details (see MAR for exact dosages):     Block needle gauge:  25 G    Guidance: ultrasound      Indication: Left groin pain  Preoperative diganosis: Left ilioinguinal neuralgia  Postoperative diagnosis: Left ilioinguinal neuralgia  Procedure: Ultrasound guided left ilioinguinal nerve block     After discussing the risks, benefits, and alternatives to the procedure, the patient expressed understanding and wished to proceed. The patient was brought to the procedure suite and placed in the supine position. A procedural pause was conducted to verify: Correct patient identity, procedure to be performed and as applicable, correct side and site, correct patient position, and availability of implants, special equipment or special " requirements. A simple surgical tray was used. Prior to the procedure, the left abdominal wall was examined with a 12 MHz linear transducer to visualize external oblique, internal oblique, and transversus abdominis muscles just medial to the anterior superior iliac spine. Following this, the abdominal wall was prepared with a ChloraPrep scrub, then reexamined using the same transducer, a sterile ultrasound transducer cover, and sterile ultrasound transducer gel. Thereafter, using ultrasound guidance, a 2.5 inch 25-gauge needle was advanced into the into the fascial plane between the internal oblique and transversus abdominis. After visualization of the tip and negative aspiration for blood, a mixture of 40 mg of Depo-Medrol in 4 mL of 0.2% ropivacaine was injected into the plane between the internal oblique and transversus abdominis. The patient tolerated the procedure well and there were no apparent complications. After an appropriate amount of observation, the patient was dismissed from the recovery area under their own power.     The patient received a total steroid dose of 40 mg of Depo-Medrol.

## 2025-06-12 ENCOUNTER — TELEPHONE (OUTPATIENT)
Dept: PAIN MEDICINE | Facility: CLINIC | Age: 60
End: 2025-06-12

## 2025-06-28 LAB
CHOLEST SERPL-MCNC: 254 MG/DL
CHOLEST/HDLC SERPL: 5.6 (CALC)
HDLC SERPL-MCNC: 45 MG/DL
LDLC SERPL CALC-MCNC: 176 MG/DL (CALC)
NONHDLC SERPL-MCNC: 209 MG/DL (CALC)
TRIGL SERPL-MCNC: 180 MG/DL

## 2025-07-02 LAB
B BURGDOR IGG SER IA-ACNC: 0.95 INDEX
B BURGDOR IGG+IGM SER QL IA: 3.7 INDEX
B BURGDOR IGM SER IA-ACNC: 1.63 INDEX

## 2025-07-21 ENCOUNTER — NURSE TRIAGE (OUTPATIENT)
Age: 60
End: 2025-07-21
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
## 2025-07-23 ENCOUNTER — OFFICE VISIT (OUTPATIENT)
Dept: FAMILY MEDICINE CLINIC | Facility: CLINIC | Age: 60
End: 2025-07-23
Payer: COMMERCIAL

## 2025-07-23 VITALS
SYSTOLIC BLOOD PRESSURE: 164 MMHG | DIASTOLIC BLOOD PRESSURE: 94 MMHG | OXYGEN SATURATION: 97 % | TEMPERATURE: 95.1 F | BODY MASS INDEX: 24.37 KG/M2 | WEIGHT: 196 LBS | HEART RATE: 88 BPM | HEIGHT: 75 IN

## 2025-07-23 DIAGNOSIS — M54.12 ACUTE CERVICAL RADICULOPATHY: ICD-10-CM

## 2025-07-23 DIAGNOSIS — M54.16 ACUTE LUMBAR RADICULOPATHY: ICD-10-CM

## 2025-07-23 DIAGNOSIS — M54.6 ACUTE MIDLINE THORACIC BACK PAIN: ICD-10-CM
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
## 2025-07-23 PROCEDURE — 99214 OFFICE O/P EST MOD 30 MIN: CPT | Performed by: NURSE PRACTITIONER

## 2025-07-23 RX ORDER — PREDNISONE 10 MG/1
TABLET ORAL
Qty: 20 TABLET | Refills: 0 | Status: SHIPPED | OUTPATIENT
Start: 2025-07-23 | End: 2025-07-31

## 2025-07-25 ENCOUNTER — HOSPITAL ENCOUNTER (OUTPATIENT)
Dept: RADIOLOGY | Facility: HOSPITAL | Age: 60
End: 2025-07-25
Payer: COMMERCIAL

## 2025-07-25 DIAGNOSIS — M54.16 ACUTE LUMBAR RADICULOPATHY: ICD-10-CM

## 2025-07-25 DIAGNOSIS — M54.12 ACUTE CERVICAL RADICULOPATHY: ICD-10-CM

## 2025-07-25 DIAGNOSIS — M54.6 ACUTE MIDLINE THORACIC BACK PAIN: ICD-10-CM

## 2025-07-25 PROCEDURE — 72100 X-RAY EXAM L-S SPINE 2/3 VWS: CPT

## 2025-07-25 PROCEDURE — 72072 X-RAY EXAM THORAC SPINE 3VWS: CPT

## 2025-07-25 PROCEDURE — 72040 X-RAY EXAM NECK SPINE 2-3 VW: CPT

## (undated) DEVICE — TUBING SUCTION 5MM X 12 FT

## (undated) DEVICE — KIT ENDO BUTTON

## (undated) DEVICE — SEAL

## (undated) DEVICE — ANTIBACTERIAL UNDYED BRAIDED (POLYGLACTIN 910), SYNTHETIC ABSORBABLE SUTURE: Brand: COATED VICRYL

## (undated) DEVICE — GLOVE SRG BIOGEL ECLIPSE 7.5

## (undated) DEVICE — ADHESIVE SKIN CLOSURE SYS EXOFIN FUSION 22CM

## (undated) DEVICE — INTENDED FOR TISSUE SEPARATION, AND OTHER PROCEDURES THAT REQUIRE A SHARP SURGICAL BLADE TO PUNCTURE OR CUT.: Brand: BARD-PARKER SAFETY BLADES SIZE 11, STERILE

## (undated) DEVICE — AIR AND WATER TUBING/CAP SET FOR OLYMPUS® SCOPES: Brand: ERBE

## (undated) DEVICE — GAUZE SPONGES,16 PLY: Brand: CURITY

## (undated) DEVICE — BLADELESS OBTURATOR: Brand: WECK VISTA

## (undated) DEVICE — SUT ETHIBOND 2-0 SH/SH 36 IN X523H

## (undated) DEVICE — KIT, BETHLEHEM THORACIC ROBOT: Brand: CARDINAL HEALTH

## (undated) DEVICE — 60 ML SYRINGE,REGULAR TIP: Brand: MONOJECT

## (undated) DEVICE — SUT MONOCRYL 4-0 PS-2 18 IN Y496G

## (undated) DEVICE — COLUMN DRAPE

## (undated) DEVICE — MEGA SUTURECUT ND: Brand: ENDOWRIST

## (undated) DEVICE — TIP-UP FENESTRATED GRASPER: Brand: ENDOWRIST

## (undated) DEVICE — FIRST STEP BEDSIDE KIT - STAND-UP POUCH, ENDOSCOPIC CLEANING PAD - 1 POUCH: Brand: FIRST STEP BEDSIDE KIT - STAND-UP POUCH, ENDOSCOPIC CLEANING PAD

## (undated) DEVICE — SUT ETHIBOND 0 SH 30 IN X834H

## (undated) DEVICE — TROCARS: Brand: KII® OPTICAL ACCESS SYSTEM

## (undated) DEVICE — INSUFFLATION NEEDLE TO ESTABLISH PNEUMOPERITONEUM.: Brand: INSUFFLATION NEEDLE

## (undated) DEVICE — ARM DRAPE

## (undated) DEVICE — VISUALIZATION SYSTEM: Brand: CLEARIFY

## (undated) DEVICE — DEFENDO AIR WATER SUCTION AND BIOPSY VALVE KIT FOR  OLYMPUS: Brand: DEFENDO AIR/WATER/SUCTION AND BIOPSY VALVE

## (undated) DEVICE — VESSEL SEALER EXTEND: Brand: ENDOWRIST

## (undated) DEVICE — PENROSE DRAIN, 18 X 3 8: Brand: CARDINAL HEALTH

## (undated) DEVICE — CADIERE FORCEPS: Brand: ENDOWRIST

## (undated) DEVICE — NEEDLE HYPO 23G X 1-1/2 IN

## (undated) DEVICE — EXOFIN PRECISION PEN HIGH VISCOSITY TOPICAL SKIN ADHESIVE: Brand: EXOFIN PRECISION PEN, 1G

## (undated) DEVICE — UTILITY MARKER,BLACK WITH LABELS: Brand: DEVON

## (undated) DEVICE — REM POLYHESIVE ADULT PATIENT RETURN ELECTRODE: Brand: VALLEYLAB